# Patient Record
Sex: FEMALE | Race: BLACK OR AFRICAN AMERICAN | Employment: UNEMPLOYED | URBAN - METROPOLITAN AREA
[De-identification: names, ages, dates, MRNs, and addresses within clinical notes are randomized per-mention and may not be internally consistent; named-entity substitution may affect disease eponyms.]

---

## 2020-08-27 ENCOUNTER — APPOINTMENT (OUTPATIENT)
Dept: GENERAL RADIOLOGY | Age: 45
End: 2020-08-27
Attending: EMERGENCY MEDICINE
Payer: COMMERCIAL

## 2020-08-27 ENCOUNTER — HOSPITAL ENCOUNTER (EMERGENCY)
Age: 45
Discharge: HOME | End: 2020-08-27
Attending: EMERGENCY MEDICINE
Payer: COMMERCIAL

## 2020-08-27 VITALS
BODY MASS INDEX: 43.39 KG/M2 | HEIGHT: 66 IN | OXYGEN SATURATION: 100 % | TEMPERATURE: 98.5 F | RESPIRATION RATE: 16 BRPM | HEART RATE: 73 BPM | DIASTOLIC BLOOD PRESSURE: 85 MMHG | WEIGHT: 270 LBS | SYSTOLIC BLOOD PRESSURE: 132 MMHG

## 2020-08-27 DIAGNOSIS — S83.91XA SPRAIN OF RIGHT KNEE, UNSPECIFIED LIGAMENT, INITIAL ENCOUNTER: Primary | ICD-10-CM

## 2020-08-27 DIAGNOSIS — M79.604 RIGHT LEG PAIN: ICD-10-CM

## 2020-08-27 PROCEDURE — 73502 X-RAY EXAM HIP UNI 2-3 VIEWS: CPT

## 2020-08-27 PROCEDURE — 99283 EMERGENCY DEPT VISIT LOW MDM: CPT

## 2020-08-27 PROCEDURE — 73562 X-RAY EXAM OF KNEE 3: CPT

## 2020-08-27 PROCEDURE — 74011250637 HC RX REV CODE- 250/637: Performed by: EMERGENCY MEDICINE

## 2020-08-27 RX ORDER — METHOCARBAMOL 750 MG/1
750 TABLET, FILM COATED ORAL
Status: COMPLETED | OUTPATIENT
Start: 2020-08-27 | End: 2020-08-27

## 2020-08-27 RX ORDER — NAPROXEN 500 MG/1
500 TABLET ORAL
Qty: 20 TAB | Refills: 0 | Status: SHIPPED | OUTPATIENT
Start: 2020-08-27

## 2020-08-27 RX ORDER — CYCLOBENZAPRINE HCL 10 MG
10 TABLET ORAL
Qty: 20 TAB | Refills: 0 | Status: SHIPPED | OUTPATIENT
Start: 2020-08-27

## 2020-08-27 RX ADMIN — METHOCARBAMOL TABLETS 750 MG: 750 TABLET, COATED ORAL at 10:52

## 2020-08-27 NOTE — LETTER
[address] 
[facility] EMERGENCY DEPT 
[address] [place] [id number] [phone] Work/School Note Date: 8/27/2020 To Whom It May concern: Mey Faith was seen and treated today in the emergency room by the following provider(s): 
Attending Provider: Neida Sparrow MD. Mey Faith may return to work on 8/28/2020. Sincerely, Kevin Crenshaw MD

## 2020-08-27 NOTE — ED PROVIDER NOTES
EMERGENCY DEPARTMENT HISTORY AND PHYSICAL EXAM      Date: 8/27/2020  Patient Name: Sudeep Kelly    History of Presenting Illness     Chief Complaint   Patient presents with    Knee Pain     rt knee pain after stepping through a hole in the ground yesterday       History Provided By: Patient    HPI: Sudeep Kelly, 50 y.o. female presents to the ED with cc of right leg pain. Patient states that she was visiting a grave site yesterday, when she accidentally fell into a hole. She states that she put her right foot on the ground and both legs fell through the hole, which was approximately 2-1/2 feet deep. She has not been able to put weight on her right leg due to the knee pain since then. Pain is a 7-8 out of 10 in severity. Patient states she had numbness in her hands when she fell yesterday, because she outstretched her hands when she fell. She denies headache, loss of consciousness, neck pain or back pain. She has minimal discomfort in her left leg. She denies cough, fever, chills, chest pain or shortness of breath. She tried Motrin for pain with minimal relief of symptoms. She states that her pain radiates from the knee to the hip, but she denies any pain in her ankle or foot. There are no other complaints, changes, or physical findings at this time. PCP: Kylee Dougherty MD    No current facility-administered medications on file prior to encounter. No current outpatient medications on file prior to encounter. Past History     Past Medical History:  Past Medical History:   Diagnosis Date    HTN (hypertension)        Past Surgical History:  Past Surgical History:   Procedure Laterality Date    HX OTHER SURGICAL      Right shoulder       Family History:  No family history on file. Social History:  Social History     Tobacco Use    Smoking status: Never Smoker   Substance Use Topics    Alcohol use: Not on file    Drug use: Not on file       Allergies:   Allergies   Allergen Reactions    Betadine [Povidone-Iodine] Hives         Review of Systems   Review of Systems   Constitutional: Negative for fever. HENT: Negative for congestion. Eyes: Negative. Respiratory: Negative for shortness of breath. Cardiovascular: Negative for chest pain. Gastrointestinal: Negative for abdominal pain. Endocrine: Negative for heat intolerance. Genitourinary: Negative. Musculoskeletal: Negative for back pain. Skin: Negative for rash. Allergic/Immunologic: Negative for immunocompromised state. Neurological: Negative for dizziness and headaches. Hematological: Does not bruise/bleed easily. Psychiatric/Behavioral: Negative. All other systems reviewed and are negative. Physical Exam   Physical Exam  Vitals signs and nursing note reviewed. Constitutional:       General: She is not in acute distress. Appearance: She is well-developed. HENT:      Head: Normocephalic. Neck:      Musculoskeletal: Normal range of motion and neck supple. Cardiovascular:      Rate and Rhythm: Normal rate and regular rhythm. Heart sounds: Normal heart sounds. Pulmonary:      Effort: Pulmonary effort is normal.      Breath sounds: Normal breath sounds. Abdominal:      General: Bowel sounds are normal.      Palpations: Abdomen is soft. Tenderness: There is no abdominal tenderness. Musculoskeletal:         General: Tenderness present. Comments: Decreased range of motion right lower extremity, right knee tenderness   Skin:     General: Skin is warm and dry. Neurological:      General: No focal deficit present. Mental Status: She is alert and oriented to person, place, and time. Psychiatric:         Mood and Affect: Mood normal.         Behavior: Behavior normal.         Diagnostic Study Results     Labs -   No results found for this or any previous visit (from the past 12 hour(s)).     Radiologic Studies -   XR HIP RT W OR WO PELV 2-3 VWS   Final Result IMPRESSION:    No acute abnormality. XR KNEE RT 3 V   Final Result   IMPRESSION: Tricompartmental right knee osteoarthritis with small joint   effusion. CT Results  (Last 48 hours)    None        CXR Results  (Last 48 hours)    None          Medical Decision Making   I am the first provider for this patient. I reviewed the vital signs, available nursing notes, past medical history, past surgical history, family history and social history. Vital Signs-Reviewed the patient's vital signs. Patient Vitals for the past 12 hrs:   Temp Pulse Resp BP SpO2   08/27/20 0949 98.5 °F (36.9 °C) 73 16 132/85 100 %           Records Reviewed: Nursing Notes, Old Medical Records and Previous Laboratory Studies    Provider Notes (Medical Decision Making):   Fracture, sprain, contusion, sciatica    ED Course:   Initial assessment performed. The patients presenting problems have been discussed, and they are in agreement with the care plan formulated and outlined with them. I have encouraged them to ask questions as they arise throughout their visit. Progress note:    Patient's results were reviewed. The patient is feeling better. She is advised to follow-up and return to ER if worse               Critical Care Time:   0    Disposition:  home    DISCHARGE PLAN:  1. Discharge Medication List as of 8/27/2020 11:59 AM      START taking these medications    Details   naproxen (NAPROSYN) 500 mg tablet Take 1 Tab by mouth every twelve (12) hours as needed for Pain., Print, Disp-20 Tab,R-0      cyclobenzaprine (FLEXERIL) 10 mg tablet Take 1 Tab by mouth three (3) times daily as needed for Muscle Spasm(s). , Print, Disp-20 Tab,R-0           2.    Follow-up Information     Follow up With Specialties Details Why Contact Info    Marlena Jones MD Family Medicine In 4 days As needed [address] \[facility]\""      Cuong Hudson MD Orthopedic Surgery  As needed [address] [facility]   [facility]  [address]  [facility]  [phone]      [facility] EMERGENCY DEPT Emergency Medicine  If symptoms worsen [address]  [address] [facility]  [phone]        3. Return to ED if worse     Diagnosis     Clinical Impression:   1. Sprain of right knee, unspecified ligament, initial encounter    2. Right leg pain        Attestations:    Rk Reich MD    Please note that this dictation was completed with [employer], the computer voice recognition software. Quite often unanticipated grammatical, syntax, homophones, and other interpretive errors are inadvertently transcribed by the computer software. Please disregard these errors. Please excuse any errors that have escaped final proofreading. Thank you.

## 2020-08-27 NOTE — DISCHARGE INSTRUCTIONS
Patient Education        Knee Sprain: Care Instructions  Your Care Instructions     A knee sprain is one or more stretched, partly torn, or completely torn knee ligaments. Ligaments are bands of ropelike tissue that connect bone to bone and make the knee stable. The knee has four main ligaments. Knee sprains often happen because of a twisting or bending injury from sports such as skiing, basketball, soccer, or football. The knee turns one way while the lower or upper leg goes another way. A sprain also can happen when the knee is hit from the side or the front. If a knee ligament is slightly stretched, you will probably need only home treatment. You may need a splint or brace (immobilizer) for a partly torn ligament. A complete tear may need surgery. A minor knee sprain may take up to 6 weeks to heal, while a severe sprain may take months. Follow-up care is a key part of your treatment and safety. Be sure to make and go to all appointments, and call your doctor if you are having problems. It's also a good idea to know your test results and keep a list of the medicines you take. How can you care for yourself at home? · Follow instructions about how much weight you can put on your leg and how to walk with crutches. · Prop up your leg on a pillow when you ice it or anytime you sit or lie down for the next 3 days. Try to keep it above the level of your heart. This will help reduce swelling. · Put ice or a cold pack on your knee for 10 to 20 minutes at a time. Try to do this every 1 to 2 hours for the next 3 days (when you are awake) or until the swelling goes down. Put a thin cloth between the ice and your skin. Do not get the splint wet. · If you have an elastic bandage, make sure it is snug but not so tight that your leg is numb, tingles, or swells below the bandage. You can loosen the bandage if it is too tight. · Your doctor may recommend a brace (immobilizer) to support your knee while it heals.  Wear it as directed. · Ask your doctor if you can take an over-the-counter pain medicine, such as acetaminophen (Tylenol), ibuprofen (Advil, Motrin), or naproxen (Aleve). Be safe with medicines. Read and follow all instructions on the label. When should you call for help? [id number] anytime you think you may need emergency care. For example, call if:  · You have sudden chest pain and shortness of breath, or you cough up blood. Call your doctor now or seek immediate medical care if:  · You have increased or severe pain. · You cannot move your toes or ankle. · Your foot is cool or pale or changes color. · You have tingling, weakness, or numbness in your foot or leg. · Your splint or brace feels too tight. · You are unable to straighten the knee, or the knee \"locks. \"  · You have signs of a blood clot in your leg, such as:  ? Pain in your calf, back of the knee, thigh, or groin. ? Redness and swelling in your leg. Watch closely for changes in your health, and be sure to contact your doctor if:  · Your pain is not getting better or is getting worse. Where can you learn more? Go to http://www.gray.com/  Enter N406 in the search box to learn more about \"Knee Sprain: Care Instructions. \"  Current as of: March 2, 2020               Content Version: 12.5  © 8066-0061 [employer]. Care instructions adapted under license by [employer] (which disclaims liability or warranty for this information). If you have questions about a medical condition or this instruction, always ask your healthcare professional. [place] [address] any warranty or liability for your use of this information. Patient Education        Leg Pain: Care Instructions  Your Care Instructions  Many things can cause leg pain. Too much exercise or overuse can cause a muscle cramp (or charley horse).  You can get leg cramps from not eating a balanced diet that has enough potassium, calcium, and other minerals. If you do not drink enough fluids or are taking certain medicines, you may develop leg cramps. Other causes of leg pain include injuries, blood flow problems, nerve damage, and twisted and enlarged veins (varicose veins). You can usually ease pain with self-care. Your doctor may recommend that you rest your leg and keep it elevated. Follow-up care is a key part of your treatment and safety. Be sure to make and go to all appointments, and call your doctor if you are having problems. It's also a good idea to know your test results and keep a list of the medicines you take. How can you care for yourself at home? · Take pain medicines exactly as directed. ? If the doctor gave you a prescription medicine for pain, take it as prescribed. ? If you are not taking a prescription pain medicine, ask your doctor if you can take an over-the-counter medicine. · Take any other medicines exactly as prescribed. Call your doctor if you think you are having a problem with your medicine. · Rest your leg while you have pain, and avoid standing for long periods of time. · Prop up your leg at or above the level of your heart when possible. · Make sure you are eating a balanced diet that is rich in calcium, potassium, and magnesium, especially if you are pregnant. · If directed by your doctor, put ice or a cold pack on the area for 10 to 20 minutes at a time. Put a thin cloth between the ice and your skin. · Your leg may be in a splint, a brace, or an elastic bandage, and you may have crutches to help you walk. Follow your doctor's directions about how long to wear supports and how to use the crutches. When should you call for help? [id number] anytime you think you may need emergency care. For example, call if:  · You have sudden chest pain and shortness of breath, or you cough up blood. · Your leg is cool or pale or changes color.   Call your doctor now or seek immediate medical care if:  · You have increasing or severe pain. · Your leg suddenly feels weak and you cannot move it. · You have signs of a blood clot, such as:  ? Pain in your calf, back of the knee, thigh, or groin. ? Redness and swelling in your leg or groin. · You have signs of infection, such as:  ? Increased pain, swelling, warmth, or redness. ? Red streaks leading from the sore area. ? Pus draining from a place on your leg. ? A fever. · You cannot bear weight on your leg. Watch closely for changes in your health, and be sure to contact your doctor if:  · You do not get better as expected. Where can you learn more? Go to http://raquel-reagan.info/  Enter Z719 in the search box to learn more about \"Leg Pain: Care Instructions. \"  Current as of: June 26, 2019               Content Version: 12.5  © 6779-5587 [employer]. Care instructions adapted under license by [employer] (which disclaims liability or warranty for this information). If you have questions about a medical condition or this instruction, always ask your healthcare professional. [place] [address] any warranty or liability for your use of this information.

## 2020-11-05 ENCOUNTER — APPOINTMENT (OUTPATIENT)
Dept: CT IMAGING | Age: 45
DRG: 103 | End: 2020-11-05
Attending: EMERGENCY MEDICINE
Payer: COMMERCIAL

## 2020-11-05 ENCOUNTER — HOSPITAL ENCOUNTER (INPATIENT)
Age: 45
LOS: 3 days | Discharge: HOME | DRG: 103 | End: 2020-11-08
Attending: EMERGENCY MEDICINE | Admitting: INTERNAL MEDICINE
Payer: COMMERCIAL

## 2020-11-05 DIAGNOSIS — R51.9 ACUTE NONINTRACTABLE HEADACHE, UNSPECIFIED HEADACHE TYPE: ICD-10-CM

## 2020-11-05 DIAGNOSIS — R47.1 DYSARTHRIA: ICD-10-CM

## 2020-11-05 DIAGNOSIS — I63.81 CEREBROVASCULAR ACCIDENT (CVA) DUE TO OCCLUSION OF SMALL ARTERY (HCC): Primary | ICD-10-CM

## 2020-11-05 PROBLEM — I63.9 CVA (CEREBRAL VASCULAR ACCIDENT) (HCC): Status: ACTIVE | Noted: 2020-11-05

## 2020-11-05 PROBLEM — Z92.82 S/P ADMN TPA IN DIFF FAC W/N LAST 24 HR BEF ADM TO CRNT FAC: Status: ACTIVE | Noted: 2020-11-05

## 2020-11-05 LAB
ALBUMIN SERPL-MCNC: 3.7 G/DL (ref 3.5–5)
ALBUMIN/GLOB SERPL: 0.9 {RATIO} (ref 1.1–2.2)
ALP SERPL-CCNC: 76 U/L (ref 45–117)
ALT SERPL-CCNC: 17 U/L (ref 12–78)
ANION GAP SERPL CALC-SCNC: 5 MMOL/L (ref 5–15)
AST SERPL-CCNC: 14 U/L (ref 15–37)
BASOPHILS # BLD: 0 K/UL (ref 0–0.1)
BASOPHILS NFR BLD: 1 % (ref 0–1)
BILIRUB SERPL-MCNC: 0.3 MG/DL (ref 0.2–1)
BUN SERPL-MCNC: 9 MG/DL (ref 6–20)
BUN/CREAT SERPL: 12 (ref 12–20)
CALCIUM SERPL-MCNC: 9.3 MG/DL (ref 8.5–10.1)
CHLORIDE SERPL-SCNC: 106 MMOL/L (ref 97–108)
CO2 SERPL-SCNC: 29 MMOL/L (ref 21–32)
CREAT SERPL-MCNC: 0.74 MG/DL (ref 0.55–1.02)
DIFFERENTIAL METHOD BLD: ABNORMAL
EOSINOPHIL # BLD: 0.1 K/UL (ref 0–0.4)
EOSINOPHIL NFR BLD: 1 % (ref 0–7)
ERYTHROCYTE [DISTWIDTH] IN BLOOD BY AUTOMATED COUNT: 14.4 % (ref 11.5–14.5)
GLOBULIN SER CALC-MCNC: 4 G/DL (ref 2–4)
GLUCOSE BLD STRIP.AUTO-MCNC: 84 MG/DL (ref 65–100)
GLUCOSE SERPL-MCNC: 82 MG/DL (ref 65–100)
HCT VFR BLD AUTO: 39.1 % (ref 35–47)
HGB BLD-MCNC: 12.6 G/DL (ref 11.5–16)
IMM GRANULOCYTES # BLD AUTO: 0 K/UL (ref 0–0.04)
IMM GRANULOCYTES NFR BLD AUTO: 0 % (ref 0–0.5)
INR PPP: 1 (ref 0.9–1.1)
LYMPHOCYTES # BLD: 1.4 K/UL (ref 0.8–3.5)
LYMPHOCYTES NFR BLD: 20 % (ref 12–49)
MCH RBC QN AUTO: 29.9 PG (ref 26–34)
MCHC RBC AUTO-ENTMCNC: 32.2 G/DL (ref 30–36.5)
MCV RBC AUTO: 92.9 FL (ref 80–99)
MONOCYTES # BLD: 0.5 K/UL (ref 0–1)
MONOCYTES NFR BLD: 7 % (ref 5–13)
NEUTS SEG # BLD: 5 K/UL (ref 1.8–8)
NEUTS SEG NFR BLD: 71 % (ref 32–75)
NRBC # BLD: 0 K/UL (ref 0–0.01)
NRBC BLD-RTO: 0 PER 100 WBC
PLATELET # BLD AUTO: 408 K/UL (ref 150–400)
PMV BLD AUTO: 8.6 FL (ref 8.9–12.9)
POTASSIUM SERPL-SCNC: 3.5 MMOL/L (ref 3.5–5.1)
PROT SERPL-MCNC: 7.7 G/DL (ref 6.4–8.2)
PROTHROMBIN TIME: 10.6 SEC (ref 9–11.1)
RBC # BLD AUTO: 4.21 M/UL (ref 3.8–5.2)
SERVICE CMNT-IMP: NORMAL
SODIUM SERPL-SCNC: 140 MMOL/L (ref 136–145)
TROPONIN I SERPL-MCNC: <0.05 NG/ML
WBC # BLD AUTO: 7 K/UL (ref 3.6–11)

## 2020-11-05 PROCEDURE — 94762 N-INVAS EAR/PLS OXIMTRY CONT: CPT

## 2020-11-05 PROCEDURE — 85610 PROTHROMBIN TIME: CPT

## 2020-11-05 PROCEDURE — 92610 EVALUATE SWALLOWING FUNCTION: CPT

## 2020-11-05 PROCEDURE — 36415 COLL VENOUS BLD VENIPUNCTURE: CPT

## 2020-11-05 PROCEDURE — 80053 COMPREHEN METABOLIC PANEL: CPT

## 2020-11-05 PROCEDURE — 84484 ASSAY OF TROPONIN QUANT: CPT

## 2020-11-05 PROCEDURE — 82962 GLUCOSE BLOOD TEST: CPT

## 2020-11-05 PROCEDURE — 37195 THROMBOLYTIC THERAPY STROKE: CPT

## 2020-11-05 PROCEDURE — 99285 EMERGENCY DEPT VISIT HI MDM: CPT

## 2020-11-05 PROCEDURE — 3E03317 INTRODUCTION OF OTHER THROMBOLYTIC INTO PERIPHERAL VEIN, PERCUTANEOUS APPROACH: ICD-10-PCS | Performed by: INTERNAL MEDICINE

## 2020-11-05 PROCEDURE — 74011000258 HC RX REV CODE- 258: Performed by: EMERGENCY MEDICINE

## 2020-11-05 PROCEDURE — 85025 COMPLETE CBC W/AUTO DIFF WBC: CPT

## 2020-11-05 PROCEDURE — 74011000250 HC RX REV CODE- 250: Performed by: EMERGENCY MEDICINE

## 2020-11-05 PROCEDURE — 74011250636 HC RX REV CODE- 250/636: Performed by: INTERNAL MEDICINE

## 2020-11-05 PROCEDURE — 96360 HYDRATION IV INFUSION INIT: CPT

## 2020-11-05 PROCEDURE — 93005 ELECTROCARDIOGRAM TRACING: CPT

## 2020-11-05 PROCEDURE — 0042T CT CODE NEURO PERF W CBF: CPT

## 2020-11-05 PROCEDURE — 74011000636 HC RX REV CODE- 636: Performed by: EMERGENCY MEDICINE

## 2020-11-05 PROCEDURE — 70496 CT ANGIOGRAPHY HEAD: CPT

## 2020-11-05 PROCEDURE — 74011250637 HC RX REV CODE- 250/637: Performed by: INTERNAL MEDICINE

## 2020-11-05 PROCEDURE — 74011250636 HC RX REV CODE- 250/636: Performed by: EMERGENCY MEDICINE

## 2020-11-05 PROCEDURE — 4A03X5D MEASUREMENT OF ARTERIAL FLOW, INTRACRANIAL, EXTERNAL APPROACH: ICD-10-PCS | Performed by: STUDENT IN AN ORGANIZED HEALTH CARE EDUCATION/TRAINING PROGRAM

## 2020-11-05 PROCEDURE — 70450 CT HEAD/BRAIN W/O DYE: CPT

## 2020-11-05 PROCEDURE — 65620000000 HC RM CCU GENERAL

## 2020-11-05 RX ORDER — DEXTROSE 50 % IN WATER (D50W) INTRAVENOUS SYRINGE
12.5
Status: ACTIVE | OUTPATIENT
Start: 2020-11-05 | End: 2020-11-06

## 2020-11-05 RX ORDER — ATORVASTATIN CALCIUM 40 MG/1
80 TABLET, FILM COATED ORAL
Status: DISCONTINUED | OUTPATIENT
Start: 2020-11-05 | End: 2020-11-08 | Stop reason: HOSPADM

## 2020-11-05 RX ORDER — SODIUM CHLORIDE 0.9 % (FLUSH) 0.9 %
10 SYRINGE (ML) INJECTION
Status: COMPLETED | OUTPATIENT
Start: 2020-11-05 | End: 2020-11-05

## 2020-11-05 RX ORDER — ONDANSETRON 2 MG/ML
4 INJECTION INTRAMUSCULAR; INTRAVENOUS
Status: DISCONTINUED | OUTPATIENT
Start: 2020-11-05 | End: 2020-11-08 | Stop reason: HOSPADM

## 2020-11-05 RX ORDER — SODIUM CHLORIDE 9 MG/ML
50 INJECTION, SOLUTION INTRAVENOUS ONCE
Status: COMPLETED | OUTPATIENT
Start: 2020-11-05 | End: 2020-11-05

## 2020-11-05 RX ORDER — ACETAMINOPHEN 650 MG/1
650 SUPPOSITORY RECTAL
Status: DISCONTINUED | OUTPATIENT
Start: 2020-11-05 | End: 2020-11-06

## 2020-11-05 RX ORDER — LABETALOL HYDROCHLORIDE 5 MG/ML
5 INJECTION, SOLUTION INTRAVENOUS
Status: DISCONTINUED | OUTPATIENT
Start: 2020-11-05 | End: 2020-11-08 | Stop reason: HOSPADM

## 2020-11-05 RX ORDER — SODIUM CHLORIDE 0.9 % (FLUSH) 0.9 %
5-40 SYRINGE (ML) INJECTION AS NEEDED
Status: DISCONTINUED | OUTPATIENT
Start: 2020-11-05 | End: 2020-11-08 | Stop reason: HOSPADM

## 2020-11-05 RX ORDER — SODIUM CHLORIDE 0.9 % (FLUSH) 0.9 %
5-40 SYRINGE (ML) INJECTION EVERY 8 HOURS
Status: DISCONTINUED | OUTPATIENT
Start: 2020-11-05 | End: 2020-11-08 | Stop reason: HOSPADM

## 2020-11-05 RX ORDER — ACETAMINOPHEN 325 MG/1
650 TABLET ORAL
Status: DISCONTINUED | OUTPATIENT
Start: 2020-11-05 | End: 2020-11-06

## 2020-11-05 RX ADMIN — ONDANSETRON 4 MG: 2 INJECTION INTRAMUSCULAR; INTRAVENOUS at 15:01

## 2020-11-05 RX ADMIN — Medication 9 MG: at 12:04

## 2020-11-05 RX ADMIN — SODIUM CHLORIDE 1000 ML: 900 INJECTION, SOLUTION INTRAVENOUS at 11:53

## 2020-11-05 RX ADMIN — ACETAMINOPHEN 650 MG: 325 TABLET ORAL at 16:24

## 2020-11-05 RX ADMIN — ATORVASTATIN CALCIUM 40 MG: 40 TABLET, FILM COATED ORAL at 22:08

## 2020-11-05 RX ADMIN — ALTEPLASE 81 MG: KIT at 12:05

## 2020-11-05 RX ADMIN — SODIUM CHLORIDE 50 ML: 9 INJECTION, SOLUTION INTRAVENOUS at 12:05

## 2020-11-05 RX ADMIN — Medication 10 ML: at 22:09

## 2020-11-05 RX ADMIN — Medication 10 ML: at 11:52

## 2020-11-05 RX ADMIN — IOPAMIDOL 110 ML: 755 INJECTION, SOLUTION INTRAVENOUS at 11:52

## 2020-11-05 RX ADMIN — SODIUM CHLORIDE 50 ML: 9 INJECTION, SOLUTION INTRAVENOUS at 13:05

## 2020-11-05 RX ADMIN — Medication 10 ML: at 15:01

## 2020-11-05 NOTE — ED PROVIDER NOTES
EMERGENCY DEPARTMENT HISTORY AND PHYSICAL EXAM      Date: 11/5/2020  Patient Name: Gaby Vang    History of Presenting Illness     Chief Complaint   Patient presents with    Headache     pt complains of acute onset of headache, left jaw pain, blurry vision and difficulty speaking that came on at work today, but she is unsure of the time. She knows she woke up normal this morning       History Provided By: Patient and patient's supervisor at the [facility]    HPI: Gaby Vang, 50 y.o. female presents to the ED with medical record history of hypertension and osteoarthritis, possibly asthma, here with difficulty speaking that started around 8:45 AM.  Patient was driven here by a coworker. Patient could relay in triage that she woke up feeling normal and went to work normal.  Speaking with a supervisor at the [facility] on the phone, the [facility] supervisor noted that the patient came to her around [id number] says she was not quite feeling well. She was encouraged to go home but she stated work and then a change was noted at 8:45 AM.  She was driven to the ED by a coworker who we are unable to find or communicate with. Patient describes a headache as well as some jaw pain not on the left jaw but the right side of the jaws where she motions to. She has difficulty, venting a complete history and was transitioned to the CAT scanner for a level 1 code stroke evaluation. He denies any chest pain, shortness of breath or difficulty breathing. She says she has never had symptoms like this before. Review of the medical record does not show she is on any blood thinners. She denies any head trauma. There are no other complaints, changes, or physical findings at this time. PCP: Mabel Singh MD    No current facility-administered medications on file prior to encounter.       Current Outpatient Medications on File Prior to Encounter   Medication Sig Dispense Refill    naproxen (NAPROSYN) 500 mg tablet Take 1 Tab by mouth every twelve (12) hours as needed for Pain. 20 Tab 0    cyclobenzaprine (FLEXERIL) 10 mg tablet Take 1 Tab by mouth three (3) times daily as needed for Muscle Spasm(s). 20 Tab 0       Past History     Past Medical History:  Past Medical History:   Diagnosis Date    HTN (hypertension)        Past Surgical History:  Past Surgical History:   Procedure Laterality Date    HX OTHER SURGICAL      Right shoulder       Family History:  No family history on file. Social History:  Social History     Tobacco Use    Smoking status: Never Smoker   Substance Use Topics    Alcohol use: Not on file    Drug use: Not on file       Allergies: Allergies   Allergen Reactions    Betadine [Povidone-Iodine] Hives         Review of Systems   Review of Systems   Unable to perform ROS: Mental status change       Physical Exam   Physical Exam   Vital signs and nursing notes reviewed    CONSTITUTIONAL: Alert, in moderate distress; well-developed; well-nourished. Obese body habitus. Tearful. HEAD:  Normocephalic, atraumatic  EYES: PERRL; EOM's intact. ENTM: Nose: no rhinorrhea; Throat: no erythema or exudate, mucous membranes moist  Neck:  Supple. trachea is midline. RESP: Chest clear, equal breath sounds. - W/R/R  CV: S1 and S2 WNL; No murmurs, gallops or rubs. 2+ radial and DP pulses bilaterally. GI: non-distended, normal bowel sounds, abdomen soft and non-tender. No masses or organomegaly. : No costo-vertebral angle tenderness. BACK:  Non-tender, normal appearance  UPPER EXT:  Normal inspection. no joint or soft tissue swelling  LOWER EXT: No edema, no calf tenderness. NEURO: Alert and oriented to person, place, thinks it is August.  Initially said she was 53 and then corrected herself and said she was 50. She is having expressive aphasia with some mild dysarthria, decreased weakness and sensation in the left upper and lower extremity. SKIN: No rashes;  Warm and dry  PSYCH: Normal mood, normal affect    Diagnostic Study Results     Labs -     Recent Results (from the past 12 hour(s))   GLUCOSE, POC    Collection Time: 11/05/20 11:36 AM   Result Value Ref Range    Glucose (POC) 84 65 - 100 mg/dL    Performed by Beverly Pizarro    CBC WITH AUTOMATED DIFF    Collection Time: 11/05/20 11:48 AM   Result Value Ref Range    WBC 7.0 3.6 - 11.0 K/uL    RBC 4.21 3.80 - 5.20 M/uL    HGB 12.6 11.5 - 16.0 g/dL    HCT 39.1 35.0 - 47.0 %    MCV 92.9 80.0 - 99.0 FL    MCH 29.9 26.0 - 34.0 PG    MCHC 32.2 30.0 - 36.5 g/dL    RDW 14.4 11.5 - 14.5 %    PLATELET 337 (H) 888 - 400 K/uL    MPV 8.6 (L) 8.9 - 12.9 FL    NRBC 0.0 0  WBC    ABSOLUTE NRBC 0.00 0.00 - 0.01 K/uL    NEUTROPHILS 71 32 - 75 %    LYMPHOCYTES 20 12 - 49 %    MONOCYTES 7 5 - 13 %    EOSINOPHILS 1 0 - 7 %    BASOPHILS 1 0 - 1 %    IMMATURE GRANULOCYTES 0 0.0 - 0.5 %    ABS. NEUTROPHILS 5.0 1.8 - 8.0 K/UL    ABS. LYMPHOCYTES 1.4 0.8 - 3.5 K/UL    ABS. MONOCYTES 0.5 0.0 - 1.0 K/UL    ABS. EOSINOPHILS 0.1 0.0 - 0.4 K/UL    ABS. BASOPHILS 0.0 0.0 - 0.1 K/UL    ABS. IMM. GRANS. 0.0 0.00 - 0.04 K/UL    DF AUTOMATED     METABOLIC PANEL, COMPREHENSIVE    Collection Time: 11/05/20 11:48 AM   Result Value Ref Range    Sodium 140 136 - 145 mmol/L    Potassium 3.5 3.5 - 5.1 mmol/L    Chloride 106 97 - 108 mmol/L    CO2 29 21 - 32 mmol/L    Anion gap 5 5 - 15 mmol/L    Glucose 82 65 - 100 mg/dL    BUN 9 6 - 20 MG/DL    Creatinine 0.74 0.55 - 1.02 MG/DL    BUN/Creatinine ratio 12 12 - 20      GFR est AA >60 >60 ml/min/1.73m2    GFR est non-AA >60 >60 ml/min/1.73m2    Calcium 9.3 8.5 - 10.1 MG/DL    Bilirubin, total 0.3 0.2 - 1.0 MG/DL    ALT (SGPT) 17 12 - 78 U/L    AST (SGOT) 14 (L) 15 - 37 U/L    Alk.  phosphatase 76 45 - 117 U/L    Protein, total 7.7 6.4 - 8.2 g/dL    Albumin 3.7 3.5 - 5.0 g/dL    Globulin 4.0 2.0 - 4.0 g/dL    A-G Ratio 0.9 (L) 1.1 - 2.2     PROTHROMBIN TIME + INR    Collection Time: 11/05/20 11:48 AM   Result Value Ref Range    INR 1.0 0.9 - 1.1      Prothrombin time 10.6 9.0 - 11.1 sec   TROPONIN I    Collection Time: 11/05/20 11:48 AM   Result Value Ref Range    Troponin-I, Qt. <0.05 <0.05 ng/mL       Radiologic Studies -   CTA CODE NEURO HEAD AND NECK W CONT   Final Result   IMPRESSION:   No evidence of large vessel occlusion, perfusion abnormality, or hemodynamically   significant carotid stenosis. CT CODE NEURO PERF W CBF   Final Result   IMPRESSION:   No evidence of large vessel occlusion, perfusion abnormality, or hemodynamically   significant carotid stenosis. CT CODE NEURO HEAD WO CONTRAST   Final Result   IMPRESSION:    Normal CT of the head. CT Results  (Last 48 hours)               11/05/20 1219  CTA CODE NEURO HEAD AND NECK W CONT Final result    Impression:  IMPRESSION:   No evidence of large vessel occlusion, perfusion abnormality, or hemodynamically   significant carotid stenosis. Narrative:  CLINICAL HISTORY: Headache, blurred vision       EXAMINATION:  CT ANGIOGRAPHY HEAD AND NECK, CT PERFUSION       COMPARISON: None       TECHNIQUE:  Following the uneventful administration of iodinated contrast   material, axial CT angiography of the head and neck was performed. Delayed axial   images through the head were also obtained. Coronal and sagittal reconstructions   were obtained. Manual postprocessing of images was performed. 3-D  Sagittal   maximal intensity projection images were obtained. 3-D Coronal maximal   intensity projections were obtained. CT brain perfusion was performed with   generation of hemodynamic maps of multiple parameters, including cerebral blood   flow, cerebral blood volume, mean transit time, and TMAX. CT dose reduction was   achieved through use of a standardized protocol tailored for this examination   and automatic exposure control for dose modulation. This study was analyzed by   the [address] ai algorithm. FINDINGS:       Delayed contrast-enhanced head CT:       The ventricles are midline without hydrocephalus. There is no acute intra or   extra-axial hemorrhage. The basal cisterns are clear. The paranasal sinuses are   clear. CTA NECK:       Great vessels: Normal arch anatomy with the origins patent. Right subclavian artery: Patent       Left subclavian artery: Patent        Right common carotid artery: Patent        Left common carotid artery: Patent        Cervical right internal carotid artery: Patent with no significant stenosis by   NASCET criteria. Cervical left internal carotid artery: Patent with no significant stenosis by   NASCET criteria. Right vertebral artery: Patent       Left vertebral artery: Patent       The lung apices are clear. The thyroid is homogeneous. No cervical   lymphadenopathy. CTA HEAD:       Right cavernous internal carotid artery: Patent       Left cavernous internal carotid artery: Patent       Anterior cerebral arteries: Patent       Anterior communicating artery: Patent       Right middle cerebral artery: Patent       Left middle cerebral artery: Patent       Posterior communicating arteries: Patent       Posterior cerebral arteries: Patent       Basilar artery: Patent       Distal vertebral arteries: Patent       No evidence for intracranial aneurysm or hemodynamically significant stenosis. CT Perfusion:   Normal CT perfusion. 11/05/20 1219  CT CODE NEURO PERF W CBF Final result    Impression:  IMPRESSION:   No evidence of large vessel occlusion, perfusion abnormality, or hemodynamically   significant carotid stenosis. Narrative:  CLINICAL HISTORY: Headache, blurred vision       EXAMINATION:  CT ANGIOGRAPHY HEAD AND NECK, CT PERFUSION       COMPARISON: None       TECHNIQUE:  Following the uneventful administration of iodinated contrast   material, axial CT angiography of the head and neck was performed. Delayed axial   images through the head were also obtained. Coronal and sagittal reconstructions   were obtained.  Manual postprocessing of images was performed. 3-D  Sagittal   maximal intensity projection images were obtained. 3-D Coronal maximal   intensity projections were obtained. CT brain perfusion was performed with   generation of hemodynamic maps of multiple parameters, including cerebral blood   flow, cerebral blood volume, mean transit time, and TMAX. CT dose reduction was   achieved through use of a standardized protocol tailored for this examination   and automatic exposure control for dose modulation. This study was analyzed by   the [address] ai algorithm. FINDINGS:       Delayed contrast-enhanced head CT:       The ventricles are midline without hydrocephalus. There is no acute intra or   extra-axial hemorrhage. The basal cisterns are clear. The paranasal sinuses are   clear. CTA NECK:       Great vessels: Normal arch anatomy with the origins patent. Right subclavian artery: Patent       Left subclavian artery: Patent        Right common carotid artery: Patent        Left common carotid artery: Patent        Cervical right internal carotid artery: Patent with no significant stenosis by   NASCET criteria. Cervical left internal carotid artery: Patent with no significant stenosis by   NASCET criteria. Right vertebral artery: Patent       Left vertebral artery: Patent       The lung apices are clear. The thyroid is homogeneous. No cervical   lymphadenopathy. CTA HEAD:       Right cavernous internal carotid artery: Patent       Left cavernous internal carotid artery: Patent       Anterior cerebral arteries: Patent       Anterior communicating artery: Patent       Right middle cerebral artery: Patent       Left middle cerebral artery: Patent       Posterior communicating arteries: Patent       Posterior cerebral arteries: Patent       Basilar artery: Patent       Distal vertebral arteries: Patent       No evidence for intracranial aneurysm or hemodynamically significant stenosis.        CT Perfusion:   Normal CT perfusion. 11/05/20 1149  CT CODE NEURO HEAD WO CONTRAST Final result    Impression:  IMPRESSION:    Normal CT of the head. Narrative:  EXAM: CT CODE NEURO HEAD WO CONTRAST       INDICATION: Code Stroke, blurry vision and difficulty speaking       COMPARISON: None. CONTRAST: None. TECHNIQUE: Unenhanced CT of the head was performed using 5 mm images. Brain and   bone windows were generated. Coronal and sagittal reformats. CT dose reduction   was achieved through use of a standardized protocol tailored for this   examination and automatic exposure control for dose modulation. FINDINGS:   The ventricles and sulci are normal in size, shape and configuration. . There is   no significant white matter disease. There is no intracranial hemorrhage,   extra-axial collection, or mass effect. The basilar cisterns are open. No CT   evidence of acute infarct. The bone windows demonstrate no abnormalities. There is mucosal thickening of   the maxillary sinuses. CXR Results  (Last 48 hours)    None          Medical Decision Making   I am the first provider for this patient. I reviewed the vital signs, available nursing notes, past medical history, past surgical history, family history and social history. Vital Signs-Reviewed the patient's vital signs. Patient Vitals for the past 12 hrs:   Temp Pulse Resp BP SpO2   11/05/20 1245  (!) 55 18 (!) 153/91 98 %   11/05/20 1242  62 17 (!) 150/76 99 %   11/05/20 1241  63 14 (!) 150/76 99 %   11/05/20 1233  64 24 (!) 144/85 99 %   11/05/20 1135 98.6 °F (37 °C) 64 16 (!) 156/99 100 %       EKG interpretation: (Preliminary)  EKG performed at 12:20 PM, as interpreted by me shows a normal sinus rhythm at a rate of 64, normal axis, normal intervals, slight T wave inversion in V2 and V3 is present without acute ST elevations or depressions.     Records Reviewed: Nursing Notes, Old Medical Records, Previous Radiology Studies and Previous Laboratory Studies    Provider Notes (Medical Decision Making):   53-year-old female with acute neurological deficit within the level 1 code stroke window and is eligible for TPA and has neuro deficits where TPA is warranted. Attempted to contact both her  and son through phone numbers available in the medical record but both went to voicemail. Patient was able to converse with neurologist to discuss risks and benefits and agreed to TPA which was administered at 12:04 PM.  IV fluid bolus also provided in the emergency department with plan for hospital admission here as patient has no large vessel occlusion on CTA. Evaluate EKG and troponin given patient's jaw pain but reassuring and [place] with only slight T wave inversions in V2 and V3. ED Course:   Initial assessment performed. The patients presenting problems have been discussed, and they are in agreement with the care plan formulated and outlined with them. I have encouraged them to ask questions as they arise throughout their visit. ED Course as of Nov 05 1311   Thu Nov 05, 2020   1205 TPA bolus started at 1204. [TL]      ED Course User Index  [TL] Ronald Stanley MD           Critical Care Time:   CRITICAL CARE NOTE :    11:42 AM    IMPENDING DETERIORATION -CNS  ASSOCIATED RISK FACTORS - CNS Decompensation  MANAGEMENT- Bedside Assessment and Supervision of Care  INTERPRETATION -  CT Scan, ECG, Blood Pressure and Cardiac Output Measures   INTERVENTIONS - hemodynamic mngmt and Neurologic interventions including TPA administration. CASE REVIEW - Hospitalist, Medical Sub-Specialist and Nursing  TREATMENT RESPONSE -Stable  PERFORMED BY - Self    NOTES   :  I have spent 75 minutes of critical care time involved in lab review, consultations with specialist, family decision- making, bedside attention and documentation. This time excludes time spent in any separate billed procedures.   During this entire length of time I was immediately available to the patient . Heather Masterson MD      Disposition:  Admit    Admit Note:  1:16 PM  Pt is being admitted by Dr. Keira Gregory. The results of their tests and reason(s) for their admission have been discussed with pt and/or available family. They convey agreement and understanding for the need to be admitted and for admission diagnosis. Diagnosis     Clinical Impression:   1. Cerebrovascular accident (CVA) due to occlusion of small artery ([address]    2. Dysarthria    3. Acute nonintractable headache, unspecified headache type        Attestations:    Heather Masterson MD    Please note that this dictation was completed with [employer], the computer voice recognition software. Quite often unanticipated grammatical, syntax, homophones, and other interpretive errors are inadvertently transcribed by the computer software. Please disregard these errors. Please excuse any errors that have escaped final proofreading. Thank you.

## 2020-11-05 NOTE — H&P
Hospitalist Admission Note    NAME: Osmani Edouard   :  1975   MRN:  [id number]     Date/Time:  2020 3:58 PM    Patient PCP: Sanjuana Boss MD  ______________________________________________________________________  Given the patient's current clinical presentation, I have a high level of concern for decompensation if discharged from the emergency department. Complex decision making was performed, which includes reviewing the patient's available past medical records, laboratory results, and x-ray films. My assessment of this patient's clinical condition and my plan of care is as follows. Assessment / Plan:  CVA status post TPA  Un-controlled hypertension  Admit into ICU, neurochecks, MRI of the brain without contrast, 2D echo  Lipid profile and HbA1c   CTA of the brain/ neck was negative for any major vessel occlusion  CT of the head in 24 hours, if no bleeding will start aspirin   Start statin once cleared by speech  Dysphagia screen prior to p.o. intake  Keep blood pressure below 180  Consult neurology  Bedrest, DVT prophylaxis with SCDs  Jaw pain, unclear reason  hiStory of tumor removal  Monitor    Code Status: Full code  Surrogate Decision Maker: Daughter    DVT Prophylaxis: SCDs  GI Prophylaxis: not indicated    Baseline: Ambulatory      Subjective:   CHIEF COMPLAINT: Dysarthria and left-sided weakness    HISTORY OF PRESENT ILLNESS:     Osmani Edouard is a 50 y.o.  female past medical history hypertension who was sent to the ED with sudden onset of dysarthria associated with some left-sided weakness that started when she was at her job around 8:45 AM.  Patient received TPA in the ED and her symptoms improved except for a residual mild left lower extremity weakness. And was seen after TPA, reported that day prior to her symptoms she was having headaches and jaw pain. She reported some nausea on her way to the ED.  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 In the ED, vital signs upon presentation showed that she was hypertensive, her labs showed normal CBC and BMP  CT of the brain /CTA of the brain and neck were within normal limit  We were asked to admit for work up and evaluation of the above problems. Past Medical History:   Diagnosis Date    HTN (hypertension)         Past Surgical History:   Procedure Laterality Date    HX OTHER SURGICAL      Right shoulder       Social History     Tobacco Use    Smoking status: Never Smoker   Substance Use Topics    Alcohol use: Not on file        No family history on file. Allergies   Allergen Reactions    Betadine [Povidone-Iodine] Hives        Prior to Admission medications    Medication Sig Start Date End Date Taking? Authorizing Provider   naproxen (NAPROSYN) 500 mg tablet Take 1 Tab by mouth every twelve (12) hours as needed for Pain. 8/27/20   Humaira Brown MD   cyclobenzaprine (FLEXERIL) 10 mg tablet Take 1 Tab by mouth three (3) times daily as needed for Muscle Spasm(s). 8/27/20   Humaira Brown MD       REVIEW OF SYSTEMS:     I am not able to complete the review of systems because:    The patient is intubated and sedated    The patient has altered mental status due to his acute medical problems    The patient has baseline aphasia from prior stroke(s)    The patient has baseline dementia and is not reliable historian    The patient is in acute medical distress and unable to provide information           Total of 12 systems reviewed as follows:       POSITIVE= underlined text  Negative = text not underlined  General:  fever, chills, sweats, generalized weakness, weight loss/gain,      loss of appetite   Eyes:    blurred vision, eye pain, loss of vision, double vision  ENT:    rhinorrhea, pharyngitis   Respiratory:   cough, sputum production, SOB, HAHN, wheezing, pleuritic pain   Cardiology:   chest pain, palpitations, orthopnea, PND, edema, syncope   Gastrointestinal:  abdominal pain , N/V, diarrhea, dysphagia, constipation, bleeding   Genitourinary:  frequency, urgency, dysuria, hematuria, incontinence   Muskuloskeletal :  arthralgia, myalgia, back pain  Hematology:  easy bruising, nose or gum bleeding, lymphadenopathy   Dermatological: rash, ulceration, pruritis, color change / jaundice  Endocrine:   hot flashes or polydipsia   Neurological:  headache, dizziness, confusion, focal weakness, paresthesia,     Speech difficulties, memory loss, gait difficulty  Psychological: Feelings of anxiety, depression, agitation    Objective:   VITALS:    Visit Vitals  BP (!) 143/93   Pulse 63   Temp 98.6 °F (37 °C)   Resp 20   Ht 5' 6\" (1.676 m)   Wt 122.5 kg (270 lb)   SpO2 99%   BMI 43.58 kg/m²       PHYSICAL EXAM:    General:    Alert, cooperative, no distress, appears stated age. HEENT: Atraumatic, anicteric sclerae, pink conjunctivae     No oral ulcers, mucosa moist, throat clear, dentition fair  Neck:  Supple, symmetrical,  thyroid: non tender  Lungs:   Clear to auscultation bilaterally. No Wheezing or Rhonchi. No rales. Chest wall:  No tenderness  No Accessory muscle use. Heart:   Regular  rhythm,  No  murmur   No edema  Abdomen:   Soft, non-tender. Not distended. Bowel sounds normal  Extremities: No cyanosis. No clubbing,      Skin turgor normal, Capillary refill normal, Radial dial pulse 2+  Skin:     Not pale. Not Jaundiced  No rashes   Psych:  Good insight. Not depressed. Not anxious or agitated. Neurologic: EOMs intact. No facial asymmetry. Persistent mild slurred speech. Persistent drift on the left lower extremity, Sensation grossly intact.  Alert and oriented X 4.     _______________________________________________________________________  Care Plan discussed with:    Comments   Patient x    Family      RN x    Care Manager                    Consultant:      _______________________________________________________________________  Expected  Disposition:   Home with Family x HH/PT/OT/RN    SNF/LTC    JESSICA    ________________________________________________________________________  TOTAL TIME:  65 Minutes    Critical Care Provided     Minutes non procedure based      Comments     Reviewed previous records   >50% of visit spent in counseling and coordination of care  Discussion with patient and/or family and questions answered       ________________________________________________________________________  Signed: Clark Webster MD    Procedures: see electronic medical records for all procedures/Xrays and details which were not copied into this note but were reviewed prior to creation of Plan. LAB DATA REVIEWED:    Recent Results (from the past 24 hour(s))   GLUCOSE, POC    Collection Time: 11/05/20 11:36 AM   Result Value Ref Range    Glucose (POC) 84 65 - 100 mg/dL    Performed by Sky Taylor    CBC WITH AUTOMATED DIFF    Collection Time: 11/05/20 11:48 AM   Result Value Ref Range    WBC 7.0 3.6 - 11.0 K/uL    RBC 4.21 3.80 - 5.20 M/uL    HGB 12.6 11.5 - 16.0 g/dL    HCT 39.1 35.0 - 47.0 %    MCV 92.9 80.0 - 99.0 FL    MCH 29.9 26.0 - 34.0 PG    MCHC 32.2 30.0 - 36.5 g/dL    RDW 14.4 11.5 - 14.5 %    PLATELET 159 (H) 396 - 400 K/uL    MPV 8.6 (L) 8.9 - 12.9 FL    NRBC 0.0 0  WBC    ABSOLUTE NRBC 0.00 0.00 - 0.01 K/uL    NEUTROPHILS 71 32 - 75 %    LYMPHOCYTES 20 12 - 49 %    MONOCYTES 7 5 - 13 %    EOSINOPHILS 1 0 - 7 %    BASOPHILS 1 0 - 1 %    IMMATURE GRANULOCYTES 0 0.0 - 0.5 %    ABS. NEUTROPHILS 5.0 1.8 - 8.0 K/UL    ABS. LYMPHOCYTES 1.4 0.8 - 3.5 K/UL    ABS. MONOCYTES 0.5 0.0 - 1.0 K/UL    ABS. EOSINOPHILS 0.1 0.0 - 0.4 K/UL    ABS. BASOPHILS 0.0 0.0 - 0.1 K/UL    ABS. IMM.  GRANS. 0.0 0.00 - 0.04 K/UL    DF AUTOMATED     METABOLIC PANEL, COMPREHENSIVE    Collection Time: 11/05/20 11:48 AM   Result Value Ref Range    Sodium 140 136 - 145 mmol/L    Potassium 3.5 3.5 - 5.1 mmol/L    Chloride 106 97 - 108 mmol/L    CO2 29 21 - 32 mmol/L    Anion gap 5 5 - 15 mmol/L Glucose 82 65 - 100 mg/dL    BUN 9 6 - 20 MG/DL    Creatinine 0.74 0.55 - 1.02 MG/DL    BUN/Creatinine ratio 12 12 - 20      GFR est AA >60 >60 ml/min/1.73m2    GFR est non-AA >60 >60 ml/min/1.73m2    Calcium 9.3 8.5 - 10.1 MG/DL    Bilirubin, total 0.3 0.2 - 1.0 MG/DL    ALT (SGPT) 17 12 - 78 U/L    AST (SGOT) 14 (L) 15 - 37 U/L    Alk.  phosphatase 76 45 - 117 U/L    Protein, total 7.7 6.4 - 8.2 g/dL    Albumin 3.7 3.5 - 5.0 g/dL    Globulin 4.0 2.0 - 4.0 g/dL    A-G Ratio 0.9 (L) 1.1 - 2.2     PROTHROMBIN TIME + INR    Collection Time: 11/05/20 11:48 AM   Result Value Ref Range    INR 1.0 0.9 - 1.1      Prothrombin time 10.6 9.0 - 11.1 sec   TROPONIN I    Collection Time: 11/05/20 11:48 AM   Result Value Ref Range    Troponin-I, Qt. <0.05 <0.05 ng/mL   EKG, 12 LEAD, INITIAL    Collection Time: 11/05/20 12:20 PM   Result Value Ref Range    Ventricular Rate 64 BPM    Atrial Rate 64 BPM    P-R Interval 188 ms    QRS Duration 84 ms    Q-T Interval 446 ms    QTC Calculation (Bezet) 460 ms    Calculated P Axis 79 degrees    Calculated R Axis 39 degrees    Calculated T Axis 64 degrees    Diagnosis       Normal sinus rhythm  Nonspecific T wave abnormality  No previous ECGs available

## 2020-11-05 NOTE — PROGRESS NOTES
Spiritual Care Assessment/Progress Note  [facility]      NAME: Brianna Fernando      MRN: [id number]  AGE: 50 y.o. SEX: female  Samaritan Affiliation: [place]atinibout[place]   Language: English     11/5/2020     Total Time (in minutes): 13     Spiritual Assessment begun in [facility] EMERGENCY DEPT through conversation with:         []Patient        [] Family    [] Friend(s)        Reason for Consult: Crisis(Code Stroke)     Spiritual beliefs: (Please include comment if needed)     [] Identifies with a donald tradition:         [] Supported by a donald community:            [] Claims no spiritual orientation:           [] Seeking spiritual identity:                [] Adheres to an individual form of spirituality:           [x] Not able to assess:                           Identified resources for coping:      [] Prayer                               [] Music                  [] Guided Imagery     [] Family/friends                 [] Pet visits     [] Devotional reading                         [x] Unknown     [] Other:                                             Interventions offered during this visit: (See comments for more details)    Patient Interventions: Other (comment)(Pt not available)           Plan of Care:     [] Support spiritual and/or cultural needs    [] Support AMD and/or advance care planning process      [] Support grieving process   [] Coordinate Rites and/or Rituals    [] Coordination with community clergy   [] No spiritual needs identified at this time   [] Detailed Plan of Care below (See Comments)  [] Make referral to Music Therapy  [] Make referral to Pet Therapy     [] Make referral to Addiction services  [] Make referral to [facility]  [] Make referral to Spiritual Care Partner  [] No future visits requested        [x] Follow up visits as needed     Comments: Attempted to visit patient Lambert Carpio in [phone] after responding to CODE stroke page.  Patient was unavailable and not in room during attempted visit. No family was present. Consult with RN leader and [address] [place] [address]. Chaplains will follow as able and/or needed.     Denia 1 TRENTON Morrow 1 Provider   Paging Service [phone])

## 2020-11-05 NOTE — PROGRESS NOTES
SPEECH PATHOLOGY BEDSIDE SWALLOW EVALUATION/DISCHARGE  Patient: Javier Damian (6 y.o. female)  Date: 11/5/2020  Primary Diagnosis: CVA (cerebral vascular accident) ([facility] [address] [I63.9]  S/P admn tPA in diff fac w/n last 24 hr bef adm to crnt fac [Z92.82]       Precautions:        ASSESSMENT :  Patient s/p TPA. Based on the objective data described below, the patient presents with no oral or pharyngeal dysphagia and suspect degree of GERD. Patient with timely mastication of solids. Timely swallow initiation and adequate hyolaryngeal elevation/excursion via palpation. No overt s/s aspiration with any consistency. Patient with globus sensation with PO in chest, feeling as though it is slow moving. She reports taking TUMS for heart burn at home but not on daily GERD meds. Patient with clear speech, feels back to baseline and doesn't feel like she had a stroke. She reports a lot of stress at work. Skilled acute therapy provided by a speech-language pathologist is not indicated at this time. PLAN :  Recommendations:  Regular/thin  meds 1 at a time whole    Discharge Recommendations: None     SUBJECTIVE:   Patient stated I don't think I had a stroke. OBJECTIVE:     Past Medical History:   Diagnosis Date    HTN (hypertension)      Past Surgical History:   Procedure Laterality Date    HX OTHER SURGICAL      Right shoulder     Prior Level of Function/Home Situation:      Diet prior to admission: reg/thin  Current Diet:  npo   Cognitive and Communication Status:  Neurologic State: Alert     Cognition: Follows commands  Perception: Appears intact  Perseveration: No perseveration noted     Oral Assessment:  Oral Assessment  Labial: No impairment  Dentition: Natural  Oral Hygiene: wfl  Lingual: No impairment  Velum: Unable to visualize  Mandible: No impairment  P.O. Trials:  Patient Position: (up in bed)  Vocal quality prior to P.O.: No impairment  Consistency Presented: Thin liquid; Solid;Puree        Bolus Acceptance: No impairment  Bolus Formation/Control: No impairment     Propulsion: No impairment  Oral Residue: None  Initiation of Swallow: No impairment  Laryngeal Elevation: Functional  Aspiration Signs/Symptoms: None  Pharyngeal Phase Characteristics: No impairment, issues, or problems   Effective Modifications: None  Cues for Modifications: None       Oral Phase Severity: No impairment  Pharyngeal Phase Severity : No impairment  NOMS:   The NOMS functional outcome measure was used to quantify this patient's level of swallowing impairment. Based on the NOMS, the patient was determined to be at level 7 for swallow function     NOMS Swallowing Levels:  Level 1 (CN): NPO  Level 2 (CM): NPO but takes consistency in therapy  Level 3 (CL): Takes less than 50% of nutrition p.o. and continues with nonoral feedings; and/or safe with mod cues; and/or max diet restriction  Level 4 (CK): Safe swallow but needs mod cues; and/or mod diet restriction; and/or still requires some nonoral feeding/supplements  Level 5 (CJ): Safe swallow with min diet restriction; and/or needs min cues  Level 6 (CI): Independent with p.o.; rare cues; usually self cues; may need to avoid some foods or needs extra time  Level 7 ([address] [place] [address]): Independent for all p.o.  SAMUEL. (2003). National Outcomes Measurement System (NOMS): Adult Speech-Language Pathology User's Guide. Pain:  Pain Scale 1: Numeric (0 - 10)  Pain Intensity 1: 7  Pain Location 1: Head  After treatment:   Patient left in no apparent distress in bed, Call bell within reach and Nursing notified    COMMUNICATION/EDUCATION:     The patient's plan of care including recommendations, planned interventions, and recommended diet changes were discussed with: Registered nurse.      Thank you for this referral.  CHASITY Leong  Time Calculation: 19 mins

## 2020-11-05 NOTE — CONSULTS
PULMONARY ASSOCIATES [facility] INTENSIVIST Consult Service Note  Pulmonary, Critical Care, and Sleep Medicine    Name: Guero Dominguez MRN: [id number]   : 1975 Hospital: [facility]   Date: 2020  Admission date: 2020 Hospital Day: 1       Subjective/Interval History:   Seen earlier today on rounds. Pt is unstable and acutely ill in the CCU. Patient was re-evaluated multiple times with repeated discussions with CCU team throughout the day    Patient Active Problem List   Diagnosis Code    CVA (cerebral vascular accident) ([facility] [address] I63.9    S/P admn tPA in diff fac w/n last 24 hr bef adm to crnt fac Z92.82       IMPRESSION:   1. Acute CVA s/p TPA; 12:04 PM; CT imaging without large vessel disease  2. Left sided weakness; right handed  3. Uncontrolled chronic HTN   4. Jaw pain- h/o tumor removal  5. Hypothyroidism? 6. Osteoarthritis   7. Obesity Body mass index is 43.58 kg/m². 8. Additional workup outlined below  9. Pt is unstable, unpredictable needing more CCU monitoring; at high risk of sudden decline and decompensation with life threatening consequenses and continued end organ dysfunction and failure  10. Pt is critically ill. Time spent with pt and staff actively rendering care, managing pt and coordinating care as stated below;  35 minutes, exclusive of any procedures      RECOMMENDATIONS/PLAN:   1. CCU monitoring , TPA completed in the ED  2. Continue with neurochecks every 1 hour, will order post TPA CT scan of the brain in the morning  3. Get MRI of the brain, 2D echo, lipid profile and HbA1c  4. Allow permissive hypertension for 24 to 48 hours  5. Hold all p.o. BP meds, labetalol as needed for systolic blood pressure > 180/105 post TPA   6. CTA of the neck did not show any major large vessel occlusion, no need for Doppler of the carotids,   7.  Neuro consulted  PT OT, speech eval, npo for now  Aspirin tomorrow if repeat CT brain   is negative for acute bleeding  Give statin once cleared by speech   MRI brain w/o C - Pending  24 hr CT Head - pending  TTE - Pending  8. Telemetry  9. Bedrest for now  10. DVT, SUP prophylaxis  11. Will be available to assist in medical management while in the CCU pending disposition         Subjective/Initial History:   I have reviewed the flowsheet and previous days notes. Seen earlier today on rounds. I was asked by Cathy Favre, MD to see Osmani Edouard a 50 y.o.  female  in consultation for a chief complaint of CVA s/p TPA    Excerpts from admission 11/5/2020 and consult notes reviewed as follows:     \" Osmani Edouard, 50 y.o. female presents to the ED with medical record history of hypertension and osteoarthritis, possibly asthma, here with difficulty speaking that started around 8:45 AM.  Patient was driven here by a coworker. Patient could relay in triage that she woke up feeling normal and went to work normal.  Speaking with a supervisor at the [facility] on the phone, the [facility] supervisor noted that the patient came to her around [phone] says she was not quite feeling well. She was encouraged to go home but she stated work and then a change was noted at 8:45 AM.  She was driven to the ED by a coworker who we are unable to find or communicate with. Patient describes a headache as well as some jaw pain not on the left jaw but the right side of the jaws where she motions to. She has difficulty, venting a complete history and was transitioned to the CAT scanner for a level 1 code stroke evaluation. He denies any chest pain, shortness of breath or difficulty breathing. She says she has never had symptoms like this before. Review of the medical record does not show she is on any blood thinners. She denies any head trauma. \"    CT Imaging results reviewed. TPA given 12:04 PM. Pt now in CCU. Sluggish verbal response. Left sided HA. Some right jaw pain. H/o tumorr resection. No change vision. Hypertensive.  Cannot recall all her meds. Has not had any this AM.       Patient PCP: Kristian Grimes MD  PMH:  has a past medical history of HTN (hypertension). PSH:   has a past surgical history that includes hx other surgical.   FHX: family history is not on file. SHX:  reports that she has never smoked. She does not have any smokeless tobacco history on file. ROS:A comprehensive review of systems was negative except for that written in the HPI.     Allergies   Allergen Reactions    Betadine [Povidone-Iodine] Hives      MEDS:   Current Facility-Administered Medications   Medication    sodium chloride (NS) flush 5-40 mL    sodium chloride (NS) flush 5-40 mL    ondansetron (ZOFRAN) injection 4 mg    dextrose (D50W) injection syrg 12.5 g    atorvastatin (LIPITOR) tablet 80 mg    acetaminophen (TYLENOL) tablet 650 mg    Or    acetaminophen (TYLENOL) solution 650 mg    Or    acetaminophen (TYLENOL) suppository 650 mg    labetaloL (NORMODYNE;TRANDATE) injection 5 mg        Current Facility-Administered Medications:     sodium chloride (NS) flush 5-40 mL, 5-40 mL, IntraVENous, Q8H, Surendra Rodgers MD, 10 mL at 11/05/20 1501    sodium chloride (NS) flush 5-40 mL, 5-40 mL, IntraVENous, PRN, Gloria Dillard MD    [facility]ron [facility]) injection 4 mg, 4 mg, IntraVENous, Q6H PRN, Gloria Dillard MD, 4 mg at 11/05/20 1501    dextrose (D50W) injection syrg 12.5 g, 12.5 g, IntraVENous, ONCE PRN, Gloria Dillard MD    atorvastatin (LIPITOR) tablet 80 mg, 80 mg, Oral, QHS, Gloria Dillard MD    acetaminophen (TYLENOL) tablet 650 mg, 650 mg, Oral, Q4H PRN **OR** acetaminophen (TYLENOL) solution 650 mg, 650 mg, Per NG tube, Q4H PRN **OR** acetaminophen (TYLENOL) suppository 650 mg, 650 mg, Rectal, Q4H PRN, Gloria Dillard MD    labetaloL (NORMODYNE;TRANDATE) injection 5 mg, 5 mg, IntraVENous, Q10MIN PRN, Gloria Dillard MD      Objective:     Vital Signs: Telemetry:    normal sinus rhythm Intake/Output:   Visit Vitals  BP (!) 143/93 Pulse 63   Temp 98.6 °F (37 °C)   Resp 20   Ht 5' 6\" (1.676 m)   Wt 122.5 kg (270 lb)   SpO2 99%   BMI 43.58 kg/m²       Temp (24hrs), Av.6 °F (37 °C), Min:98.6 °F (37 °C), Max:98.6 °F (37 °C)        O2 Device: Room air           Body mass index is 43.58 kg/m². Wt Readings from Last 4 Encounters:   20 122.5 kg (270 lb)   20 122.5 kg (270 lb)          Intake/Output Summary (Last 24 hours) at 2020 1509  Last data filed at 2020 1357  Gross per 24 hour   Intake 1100 ml   Output    Net 1100 ml       Last shift:       07 -  190  In: 1100 [I.V.:1100]  Out: -   Last 3 shifts: No intake/output data recorded. Physical Exam:    General:  female; in no apparent distress, cooperative and moderately ill;  t  HEENT: NCAT, poor dentition, lips and mucosa dry  Eyes: anicteric; conjunctiva clear  Neck: no nodes, no accessory MM use. Chest: no deformity,   Cardiac: IR regular; no murmur  Lungs: distant breath sounds; no wheezes, no rales, no rhonchi  Abd: soft, NT, hypoactive BS, OBESE  Ext: no edema; no joint swelling; No clubbing  : NO olmos,   Neuro: CNN intact; LUE and LLE motor 3-4/5; slowed mentation but cooperative,  follows commands; lethargic; mild left pronator drift? Sensation intact? Psych- no agitation, oriented to person;   Skin: warm, dry, no cyanosis;   Pulses: 1-2+ Bilateral pedal, radial  Capillary: brisk;       Labs:    Recent Labs     20  1148   WBC 7.0   HGB 12.6   *   INR 1.0     Recent Labs     20  1148      K 3.5      CO2 29   GLU 82   BUN 9   CREA 0.74   CA 9.3   ALB 3.7   ALT 17     No results for input(s): PH, PCO2, PO2, HCO3, FIO2 in the last 72 hours.   Recent Labs     20  1148   TROIQ <0.05     No results found for: BNPP, BNP   No results found for: CULT   No results found for: VANCT, CPK    Imaging:  I have personally reviewed the patients radiographs and have reviewed the reports:    CXR Results (Last 48 hours)    None          Results from Hospital Encounter encounter on 11/05/20   CT CODE NEURO PERF W CBF    Narrative CLINICAL HISTORY: Headache, blurred vision    EXAMINATION:  CT ANGIOGRAPHY HEAD AND NECK, CT PERFUSION    COMPARISON: None    TECHNIQUE:  Following the uneventful administration of iodinated contrast  material, axial CT angiography of the head and neck was performed. Delayed axial  images through the head were also obtained. Coronal and sagittal reconstructions  were obtained. Manual postprocessing of images was performed. 3-D  Sagittal  maximal intensity projection images were obtained. 3-D Coronal maximal  intensity projections were obtained. CT brain perfusion was performed with  generation of hemodynamic maps of multiple parameters, including cerebral blood  flow, cerebral blood volume, mean transit time, and TMAX. CT dose reduction was  achieved through use of a standardized protocol tailored for this examination  and automatic exposure control for dose modulation. This study was analyzed by  the [address] ai algorithm. FINDINGS:    Delayed contrast-enhanced head CT:    The ventricles are midline without hydrocephalus. There is no acute intra or  extra-axial hemorrhage. The basal cisterns are clear. The paranasal sinuses are  clear. CTA NECK:    Great vessels: Normal arch anatomy with the origins patent. Right subclavian artery: Patent    Left subclavian artery: Patent     Right common carotid artery: Patent     Left common carotid artery: Patent     Cervical right internal carotid artery: Patent with no significant stenosis by  NASCET criteria. Cervical left internal carotid artery: Patent with no significant stenosis by  NASCET criteria. Right vertebral artery: Patent    Left vertebral artery: Patent    The lung apices are clear. The thyroid is homogeneous. No cervical  lymphadenopathy.     CTA HEAD:    Right cavernous internal carotid artery: Patent    Left cavernous internal carotid artery: Patent    Anterior cerebral arteries: Patent    Anterior communicating artery: Patent    Right middle cerebral artery: Patent    Left middle cerebral artery: Patent    Posterior communicating arteries: Patent    Posterior cerebral arteries: Patent    Basilar artery: Patent    Distal vertebral arteries: Patent    No evidence for intracranial aneurysm or hemodynamically significant stenosis. CT Perfusion:  Normal CT perfusion. Impression IMPRESSION:  No evidence of large vessel occlusion, perfusion abnormality, or hemodynamically  significant carotid stenosis. During this entire length of time the patient's condition was unstable, unpredictable and critically ill in the CCU/ ICU. I was immediately available to the patient whose care required several interactions with nursing, multidisciplinary team members leading to multiple interventions with fluid resuscitation and medication adjustments to optimize respiratory support, hemodynamic treatment, medication changes based on repeat labs results, reviews, exams and assessments. The reason for providing this level of medical care was due to a critical illness that impaired one or more vital organ systems, such that there was a high probability of sudden or life threatening deterioration in the patient's condition. This care involved high complexity medical decision making to treat acute and unstable vital organ system failure, and to prevent further life threatening deterioration of the patients condition.  I personally:  · Reviewed the flowsheet and previous days notes  · Reviewed and summarized records or history from previous days note or discussions with staff, family  · Parenteral controlled substances - Reviewed/ Adjusted / Link Sanchez / Started  · High Risk Drug therapy requiring intensive monitoring for toxicity: eg steroids, pressors, antibiotics  · Reviewed and/or ordered Clinical lab tests  · Reviewed and/or ordered Radiology tests  · Reviewed and/or ordered of Medicine tests  · Independently visualized radiologic Images  · Reviewed the patients ECG / Telemetry  ·  discussed my assessment/management with : Nursing,  Respiratory Therapy, for coordination of care           Thank you for allowing us to participate in the care of this patient. We will follow along with you until they no longer require CCU services.     Elena Barry MD

## 2020-11-05 NOTE — ED NOTES
11:38 Level 1 code stroke called in Triage. 11:41 Stroke stretcher and Neuro Tele machine to CT  11:43  Pt arrived to CT via wheelchair. 11:45 18g IV to LAC.  11:46 CT of Head  11:49 Tele Neuro MD evaluating pt in CT.   11:53  NS Bolus hung  12:04 TPA Bolus started  12:05 TPA started  12:10 149/83, 71p      13:24  Pt's daughter called and updated. Luisito Abdul [phone].    13:25  Hospitalist at bedside evaluating pt for admission. 14:11 Attempting to call report at this time. 14:20 Bedside report given to [place] .

## 2020-11-05 NOTE — PROGRESS NOTES
Speech pathology  Orders received. Patient BETH. SLP will follow up when appropriate/available.    Thanks,  Sergio Haas M.S. CCC-SLP

## 2020-11-06 ENCOUNTER — APPOINTMENT (OUTPATIENT)
Dept: CT IMAGING | Age: 45
DRG: 103 | End: 2020-11-06
Attending: INTERNAL MEDICINE
Payer: COMMERCIAL

## 2020-11-06 ENCOUNTER — APPOINTMENT (OUTPATIENT)
Dept: NON INVASIVE DIAGNOSTICS | Age: 45
DRG: 103 | End: 2020-11-06
Attending: INTERNAL MEDICINE
Payer: COMMERCIAL

## 2020-11-06 LAB
ANION GAP SERPL CALC-SCNC: 7 MMOL/L (ref 5–15)
ATRIAL RATE: 64 BPM
BUN SERPL-MCNC: 7 MG/DL (ref 6–20)
BUN/CREAT SERPL: 11 (ref 12–20)
CALCIUM SERPL-MCNC: 9 MG/DL (ref 8.5–10.1)
CALCULATED P AXIS, ECG09: 79 DEGREES
CALCULATED R AXIS, ECG10: 39 DEGREES
CALCULATED T AXIS, ECG11: 64 DEGREES
CHLORIDE SERPL-SCNC: 107 MMOL/L (ref 97–108)
CHOLEST SERPL-MCNC: 222 MG/DL
CO2 SERPL-SCNC: 26 MMOL/L (ref 21–32)
CREAT SERPL-MCNC: 0.63 MG/DL (ref 0.55–1.02)
DIAGNOSIS, 93000: NORMAL
ECHO AO ROOT DIAM: 3.45 CM
ECHO AV AREA PEAK VELOCITY: 3.02 CM2
ECHO AV AREA VTI: 3.44 CM2
ECHO AV AREA/BSA PEAK VELOCITY: 1.3 CM2/M2
ECHO AV AREA/BSA VTI: 1.5 CM2/M2
ECHO AV MEAN GRADIENT: 2.7 MMHG
ECHO AV PEAK GRADIENT: 5.81 MMHG
ECHO AV PEAK VELOCITY: 120.53 CM/S
ECHO AV VTI: 24.8 CM
ECHO LA AREA 4C: 14.62 CM2
ECHO LA MAJOR AXIS: 3.7 CM
ECHO LA MINOR AXIS: 1.63 CM
ECHO LA VOL 4C: 35.31 ML (ref 22–52)
ECHO LA VOLUME INDEX A4C: 15.54 ML/M2 (ref 16–28)
ECHO LV E' LATERAL VELOCITY: 14.18 CM/S
ECHO LV E' SEPTAL VELOCITY: 9.98 CM/S
ECHO LV EDV A4C: 125.57 ML
ECHO LV EDV INDEX A4C: 55.3 ML/M2
ECHO LV EJECTION FRACTION A4C: 58 PERCENT
ECHO LV ESV A4C: 53.12 ML
ECHO LV ESV INDEX A4C: 23.4 ML/M2
ECHO LV INTERNAL DIMENSION DIASTOLIC: 4.89 CM (ref 3.9–5.3)
ECHO LV INTERNAL DIMENSION SYSTOLIC: 3.06 CM
ECHO LV IVSD: 0.92 CM (ref 0.6–0.9)
ECHO LV MASS 2D: 189.9 G (ref 67–162)
ECHO LV MASS INDEX 2D: 83.6 G/M2 (ref 43–95)
ECHO LV POSTERIOR WALL DIASTOLIC: 1.2 CM (ref 0.6–0.9)
ECHO LVOT CARDIAC OUTPUT: 3.99 LITER/MINUTE
ECHO LVOT DIAM: 2.11 CM
ECHO LVOT PEAK GRADIENT: 4.37 MMHG
ECHO LVOT PEAK VELOCITY: 104.48 CM/S
ECHO LVOT SV: 85.4 ML
ECHO LVOT VTI: 24.48 CM
ECHO MV A VELOCITY: 53.93 CM/S
ECHO MV AREA PHT: 1.57 CM2
ECHO MV AREA VTI: 2.82 CM2
ECHO MV E DECELERATION TIME (DT): 361.02 MS
ECHO MV E VELOCITY: 61.58 CM/S
ECHO MV E/A RATIO: 1.14
ECHO MV E/E' LATERAL: 4.34
ECHO MV E/E' RATIO (AVERAGED): 5.26
ECHO MV E/E' SEPTAL: 6.17
ECHO MV MAX VELOCITY: 80.8 CM/S
ECHO MV MEAN GRADIENT: 0.72 MMHG
ECHO MV PEAK GRADIENT: 2.61 MMHG
ECHO MV PRESSURE HALF TIME (PHT): 140.07 MS
ECHO MV VTI: 30.26 CM
ECHO PV MAX VELOCITY: 92.6 CM/S
ECHO PV MEAN GRADIENT: 1.6 MMHG
ECHO PV PEAK INSTANTANEOUS GRADIENT SYSTOLIC: 3.43 MMHG
ECHO PV VTI: 21.26 CM
ECHO RA AREA 4C: 17.18 CM2
ERYTHROCYTE [DISTWIDTH] IN BLOOD BY AUTOMATED COUNT: 14.3 % (ref 11.5–14.5)
EST. AVERAGE GLUCOSE BLD GHB EST-MCNC: 105 MG/DL
GLUCOSE SERPL-MCNC: 87 MG/DL (ref 65–100)
HBA1C MFR BLD: 5.3 % (ref 4–5.6)
HCT VFR BLD AUTO: 34.3 % (ref 35–47)
HDLC SERPL-MCNC: 65 MG/DL
HDLC SERPL: 3.4 {RATIO} (ref 0–5)
HGB BLD-MCNC: 11.1 G/DL (ref 11.5–16)
LDLC SERPL CALC-MCNC: 139.6 MG/DL (ref 0–100)
LIPID PROFILE,FLP: ABNORMAL
LVOT MG: 2.06 MMHG
MCH RBC QN AUTO: 30.1 PG (ref 26–34)
MCHC RBC AUTO-ENTMCNC: 32.4 G/DL (ref 30–36.5)
MCV RBC AUTO: 93 FL (ref 80–99)
NRBC # BLD: 0 K/UL (ref 0–0.01)
NRBC BLD-RTO: 0 PER 100 WBC
P-R INTERVAL, ECG05: 188 MS
PLATELET # BLD AUTO: 332 K/UL (ref 150–400)
PMV BLD AUTO: 8.4 FL (ref 8.9–12.9)
POTASSIUM SERPL-SCNC: 3.6 MMOL/L (ref 3.5–5.1)
Q-T INTERVAL, ECG07: 446 MS
QRS DURATION, ECG06: 84 MS
QTC CALCULATION (BEZET), ECG08: 460 MS
RBC # BLD AUTO: 3.69 M/UL (ref 3.8–5.2)
SODIUM SERPL-SCNC: 140 MMOL/L (ref 136–145)
TRIGL SERPL-MCNC: 87 MG/DL (ref ?–150)
TROPONIN I SERPL-MCNC: <0.05 NG/ML
VENTRICULAR RATE, ECG03: 64 BPM
VLDLC SERPL CALC-MCNC: 17.4 MG/DL
WBC # BLD AUTO: 6.1 K/UL (ref 3.6–11)

## 2020-11-06 PROCEDURE — 2709999900 HC NON-CHARGEABLE SUPPLY

## 2020-11-06 PROCEDURE — 94760 N-INVAS EAR/PLS OXIMETRY 1: CPT

## 2020-11-06 PROCEDURE — 74011250637 HC RX REV CODE- 250/637: Performed by: PSYCHIATRY & NEUROLOGY

## 2020-11-06 PROCEDURE — 74011250637 HC RX REV CODE- 250/637: Performed by: INTERNAL MEDICINE

## 2020-11-06 PROCEDURE — 36415 COLL VENOUS BLD VENIPUNCTURE: CPT

## 2020-11-06 PROCEDURE — 70450 CT HEAD/BRAIN W/O DYE: CPT

## 2020-11-06 PROCEDURE — 97530 THERAPEUTIC ACTIVITIES: CPT

## 2020-11-06 PROCEDURE — 93306 TTE W/DOPPLER COMPLETE: CPT

## 2020-11-06 PROCEDURE — 74011250636 HC RX REV CODE- 250/636: Performed by: INTERNAL MEDICINE

## 2020-11-06 PROCEDURE — 97162 PT EVAL MOD COMPLEX 30 MIN: CPT

## 2020-11-06 PROCEDURE — 85027 COMPLETE CBC AUTOMATED: CPT

## 2020-11-06 PROCEDURE — 80048 BASIC METABOLIC PNL TOTAL CA: CPT

## 2020-11-06 PROCEDURE — 84484 ASSAY OF TROPONIN QUANT: CPT

## 2020-11-06 PROCEDURE — 65660000000 HC RM CCU STEPDOWN

## 2020-11-06 PROCEDURE — 74011000250 HC RX REV CODE- 250: Performed by: INTERNAL MEDICINE

## 2020-11-06 PROCEDURE — 94640 AIRWAY INHALATION TREATMENT: CPT

## 2020-11-06 PROCEDURE — 80061 LIPID PANEL: CPT

## 2020-11-06 PROCEDURE — 97116 GAIT TRAINING THERAPY: CPT

## 2020-11-06 PROCEDURE — 99291 CRITICAL CARE FIRST HOUR: CPT | Performed by: PSYCHIATRY & NEUROLOGY

## 2020-11-06 PROCEDURE — 83036 HEMOGLOBIN GLYCOSYLATED A1C: CPT

## 2020-11-06 PROCEDURE — 97165 OT EVAL LOW COMPLEX 30 MIN: CPT

## 2020-11-06 RX ORDER — GUAIFENESIN 100 MG/5ML
81 LIQUID (ML) ORAL DAILY
Status: DISCONTINUED | OUTPATIENT
Start: 2020-11-07 | End: 2020-11-08 | Stop reason: HOSPADM

## 2020-11-06 RX ORDER — ACETAMINOPHEN 325 MG/1
650 TABLET ORAL
Status: DISCONTINUED | OUTPATIENT
Start: 2020-11-06 | End: 2020-11-08 | Stop reason: HOSPADM

## 2020-11-06 RX ORDER — BUTALBITAL, ACETAMINOPHEN AND CAFFEINE 50; 325; 40 MG/1; MG/1; MG/1
1 TABLET ORAL ONCE
Status: COMPLETED | OUTPATIENT
Start: 2020-11-06 | End: 2020-11-06

## 2020-11-06 RX ORDER — ACETAMINOPHEN 650 MG/1
650 SUPPOSITORY RECTAL
Status: DISCONTINUED | OUTPATIENT
Start: 2020-11-06 | End: 2020-11-08 | Stop reason: HOSPADM

## 2020-11-06 RX ORDER — AMLODIPINE BESYLATE 5 MG/1
10 TABLET ORAL DAILY
Status: DISCONTINUED | OUTPATIENT
Start: 2020-11-07 | End: 2020-11-08 | Stop reason: HOSPADM

## 2020-11-06 RX ORDER — ALBUTEROL SULFATE 0.83 MG/ML
2.5 SOLUTION RESPIRATORY (INHALATION)
Status: DISCONTINUED | OUTPATIENT
Start: 2020-11-06 | End: 2020-11-08 | Stop reason: HOSPADM

## 2020-11-06 RX ADMIN — ATORVASTATIN CALCIUM 80 MG: 40 TABLET, FILM COATED ORAL at 23:00

## 2020-11-06 RX ADMIN — ONDANSETRON 4 MG: 2 INJECTION INTRAMUSCULAR; INTRAVENOUS at 11:12

## 2020-11-06 RX ADMIN — ALBUTEROL SULFATE 2.5 MG: 2.5 SOLUTION RESPIRATORY (INHALATION) at 09:47

## 2020-11-06 RX ADMIN — BUTALBITAL, ACETAMINOPHEN, AND CAFFEINE 1 TABLET: 50; 325; 40 TABLET ORAL at 12:42

## 2020-11-06 RX ADMIN — ACETAMINOPHEN 650 MG: 325 TABLET ORAL at 20:01

## 2020-11-06 RX ADMIN — Medication 10 ML: at 06:17

## 2020-11-06 RX ADMIN — Medication 10 ML: at 19:01

## 2020-11-06 RX ADMIN — ACETAMINOPHEN 650 MG: 325 TABLET ORAL at 06:16

## 2020-11-06 RX ADMIN — Medication 10 ML: at 23:00

## 2020-11-06 NOTE — PROGRESS NOTES
[id number]:    6750  Patient transferred from ED to CCU [phone] for management of her stroke and frequent neuro checks. Patient awake and alert. VSS. 1530  Assessment completed and per flow. Patient appears to be anxious. Encouraged patient to focus on staying calm as this will help with her BP and anxiety. 1830  Regular tray per SLP-patient eating all without difficulty. 2000  No change in assessment. No notable aphasia at this time and drift of both left extremities negligible. Patient talking on phone with mother and crying/mother crying. Encouraged both to focus on the lack of deficits and patient's response to treatment. 2100  Patient assisted with bedpan. Able to use but urine unmeasurable. 2330  Report to ADONIS Yanez. NIH exam performed at bedside together for hand off of care.

## 2020-11-06 NOTE — PROGRESS NOTES
Problem: Self Care Deficits Care Plan (Adult)  Goal: *Acute Goals and Plan of Care (Insert Text)  Description:   FUNCTIONAL STATUS PRIOR TO ADMISSION: Pt was independent prior to admission but lives w/ daughter who was assisting pt w/ ADL post rotator cuff surgery in April. Pt was working 10 hr days at [facility] and reported high levels of stress. HOME SUPPORT: Pt lives w/ daughter. Occupational Therapy Goals  Initiated 11/6/2020  1. Patient will perform 1 standing ADL with independence within 7 day(s). 2.  Patient will perform UB and LB dressing with independence within 7 day(s). 3.  Patient will perform all aspects of toileting and transfers with independence within 7 day(s). 4.  Patient will participate in upper extremity and [facility] therapeutic exercises/activities with independence for 10 minutes within 7 day(s). Outcome: Not Met   OCCUPATIONAL THERAPY EVALUATION  Patient: Aron Gardner (1 y.o. female)  Date: 11/6/2020  Primary Diagnosis: CVA (cerebral vascular accident) ([facility] [address] [I63.9]  S/P admn tPA in diff fac w/n last 24 hr bef adm to crnt fac [Z92.82]        Precautions:       ASSESSMENT  Based on the objective data described below, the patient presents with slightly impaired functional mobility, impaired strength and AROM in R shoulder due to past rotator cuff surgery, and mild weakness in L hand [facility] grasp due to pending CVA. The pt reported still feeling unlike her normal self but that symptoms are improving, her speech has less slurring but still slow. The pt also reported tingling in arm has decreased and sensation returned but slight weakness in LUE. When the pt was sitting at EOB to adjust socks she required no assistance but during LE ROM pt appeared to lose balance posteriorly. The pt was CGA for functional mobility and was able to maintain balance walking while talking in depth during conversation.  The pt scored a 63/66 on the Fugl-Falk with most impairments in RUE w/ shoulder mobility and mild weakness during pincer and lateral grasp in L hand. Current Level of Function Impacting Discharge (ADLs/self-care):   Feeding: Setup    Oral Facial Hygiene/Grooming: Supervision;Stand-by assistance    Bathing: Stand-by assistance    Upper Body Dressing: Setup    Lower Body Dressing: Setup;Stand-by assistance    Toileting: Stand by assistance    Functional Outcome Measure: The patient scored Total A-D (Motor Function): 63/66 on the Fugl-Falk outcome measure which is indicative of very minimal impairment. Other factors to consider for discharge: pending MRI for CVA     Patient will benefit from skilled therapy intervention to address the above noted impairments. PLAN :  Recommendations and Planned Interventions: self care training, functional mobility training, and therapeutic exercise    Frequency/Duration: Patient will be followed by occupational therapy 3 times a week to address goals. Recommendation for discharge: (in order for the patient to meet his/her long term goals)  TBD but may not require rehab upon D/C    This discharge recommendation:  A follow-up discussion with the attending provider and/or case management is planned    IF patient discharges home will need the following DME: patient owns DME required for discharge and none       SUBJECTIVE:   Patient stated I have been really stressed.     OBJECTIVE DATA SUMMARY:   HISTORY:   Past Medical History:   Diagnosis Date    HTN (hypertension)      Past Surgical History:   Procedure Laterality Date    HX OTHER SURGICAL      Right shoulder       Expanded or extensive additional review of patient history:     Home Situation  Home Environment: Apartment  # Steps to Enter: 1  Rails to Enter: Yes  Hand Rails : Left  One/Two Story Residence: Two story  # of Interior Steps: 12  Living Alone: No  Support Systems: Family member(s)(daughter)  Patient Expects to be Discharged to[de-identified] Apartment  Current DME Used/Available at Home: Crutches, Cane, straight, Grab bars  Tub or Shower Type: Tub(mostly baths, but leans on toilet seat to get in)    Hand dominance: Right    EXAMINATION OF PERFORMANCE DEFICITS:  Cognitive/Behavioral Status:  Neurologic State: Alert  Orientation Level: Oriented X4  Cognition: Follows commands  Perception: Appears intact     Safety/Judgement: Awareness of environment    Hearing: Auditory  Auditory Impairment: None      Range of Motion:  AROM: Generally decreased, functional(R shoulder due to rotator cuff repair in April )  PROM: Within functional limits                      Strength:  Strength: Generally decreased, functional                Coordination:  Coordination: Within functional limits  Fine Motor Skills-Upper: Right Intact; Left Impaired(mildy impaired pincer and lateral grasp)    Gross Motor Skills-Upper: Right Impaired;Left Intact(due to rotator cuff repair)    Tone & Sensation:  Tone: Normal  Sensation: Impaired(reports tingling on L UE )                      Balance:  Sitting: Intact; Without support  Standing: Impaired; Without support  Standing - Static: Good  Standing - Dynamic : Good    Functional Mobility and Transfers for ADLs:  Bed Mobility:  Rolling: Supervision  Supine to Sit: Supervision  Scooting: Supervision    Transfers:  Sit to Stand: Contact guard assistance  Stand to Sit: Contact guard assistance  Bed to Chair: Contact guard assistance    ADL Assessment:  Feeding: Setup    Oral Facial Hygiene/Grooming: Supervision;Stand-by assistance    Bathing: Stand-by assistance    Upper Body Dressing: Setup    Lower Body Dressing: Setup;Stand-by assistance    Toileting: Stand by assistance                ADL Intervention and task modifications:     Lower Body Dressing Assistance  Socks: Supervision(adjusting socks seated at EOB)      Cognitive Retraining  Safety/Judgement: Awareness of environment    Therapeutic Exercise/Activity:  Pt has functional ROM w/ no noted greater weakness in one arm or the other.   Pt was only slightly limited in pincer grasp and lateral pinch w/ L hand. Functional Measure:  Fugl-Falk Assessment of Motor Recovery after Stroke:     Reflex Activity  Flexors/Biceps/Fingers: Can be elicited  Extensors/Triceps: Can be elicited  Reflex Subtotal: 4    Volitional Movement Within Synergies  Shoulder Retraction: Full  Shoulder Elevation: Full  Shoulder Abduction (90 degrees): Full  Shoulder External Rotation: Full  Elbow Flexion: Full  Forearm Supination: Full  Shoulder Adduction/Internal Rotation: Full  Elbow Extension: Full  Forearm Pronation: Full  Subtotal: 18    Volitional Movement Mixing Synergies  Hand to Lumbar Spine: Partial(RUE limited)  Shoulder Flexion (0-90 degrees): Full  Pronation-Supination: Full  Subtotal: 5    Volitional Movement With Little or No Synergy  Shoulder Abduction (0-90 degrees): Full  Shoulder Flexion ( degrees): Full  Pronation/Supination: Full  Subtotal : 6    Normal Reflex Activity  Biceps, Triceps, Finger Flexors: Full  Subtotal : 2    Upper Extremity Total   Upper Extremity Total: 35    Wrist  Stability at 15 Degree Dorsiflexion: Full  Repeated Dorsiflexion/ Volar Flexion: Full  Stability at 15 Degree Dorsiflexion: Full  Repeated Dorsiflexion/ Volar Flexion: Full  Circumduction: Full  Wrist Total: 10    Hand  Mass Flexion: Full  Mass Extension: Full  Grasp A: Full  Grasp B: Partial  Grasp C: Partial  Grasp D: Full  Grasp E: Full  Hand Total: 12    Coordination/Speed  Tremor: None  Dysmetria: None  Time: <1s  Coordination/Speed Total : 6    Total A-D  Total A-D (Motor Function): 63/66         This is a reliable/valid measure of arm function after a neurological event. It has established value to characterize functional status and for measuring spontaneous and therapy-induced recovery; tests proximal and distal motor functions.  Fugl-Falk Assessment  UE scores recorded between five and 30 days post neurologic event can be used to predict UE recovery at six months post neurologic event. Severe = 0-21 points   Moderately Severe = 22-33 points   Moderate = 34-47 points   Mild = 48-66 points  Tri OLYA Mathews, TAMARA Anthony, & AROLDO Villalobos (1992). Measurement of motor recovery after stroke: Outcome assessment and sample size requirements. Stroke, 23, pp. 3493-2991.   --------------------------------------------------------------------------------------------------------------------------------------------------------------------  MCID:  Stroke:   [place] et al, 2001; n = 171; mean age 79 (6) years; assessed within 16 (12) days of stroke, Acute Stroke)  FMA Motor Scores from Admission to Discharge   10 point increase in FMA Upper Extremity = 1.5 change in discharge FIM   10 point increase in FMA Lower Extremity = 1.9 change in discharge FIM  MDC:   Stroke:   Kristyn Bernstein et al, 2008, n = 14, mean age = 59.9 (14.6) years, assessed on average 14 (6.5) months post stroke, Chronic Stroke)   FMA = 5.2 points for the Upper Extremity portion of the assessment       Occupational Therapy Evaluation Charge Determination   History Examination Decision-Making   LOW Complexity : Brief history review  LOW Complexity : 1-3 performance deficits relating to physical, cognitive , or psychosocial skils that result in activity limitations and / or participation restrictions  LOW Complexity : No comorbidities that affect functional and no verbal or physical assistance needed to complete eval tasks       Based on the above components, the patient evaluation is determined to be of the following complexity level: LOW     Activity Tolerance:   Good    After treatment patient left in no apparent distress:    Sitting in chair, Call bell within reach, and Bed / chair alarm activated    COMMUNICATION/EDUCATION:   The patients plan of care was discussed with: Physical therapist.     Patient/family have participated as able in goal setting and plan of care.     This patients plan of care is appropriate for delegation to [facility]. Thank you for this referral.  Kosta Marcial  Time Calculation: 36 mins   Regarding student involvement in patient care:  A student participated in this treatment session. Per CMS Medicare statements and AOTA guidelines I certify that the following was true:  1. I was present and directly observed the entire session. 2. I made all skilled judgments and clinical decisions regarding care. 3. I am the practitioner responsible for assessment, treatment, and documentation.

## 2020-11-06 NOTE — PROGRESS NOTES
0700: Verbal shift change report given to Sho Riggins RN (oncoming nurse) by [employer], RN (offgoing nurse). Report included the following information SBAR, Kardex, Intake/Output, MAR, Recent Results and Cardiac Rhythm NSR, SB.     0740: Assessment completed; see flowsheets. Q1 NIHSS completed, pt scored a 1 for slight aphasia, delayed speech. Pupils equal and reactive; pt has expiratory wheezing upper lobes. Pt requesting neb tx for daily use. Per night shift report, pt passed SPL speech swallow screen. RN to assess and ask for diet order. All other systems WNL. 0800: RN spoke with CT staff Meg; per staff 24 hour followup CT scan to be done at noon today. 0935: ID rounds held with CCU staff and provider Evan Richardson. Orders for cardiac diet, neb tx and DVT prophylaxis orders placed. Provider aware of CT scan scheduled for noon followup.     0925: Echo staff at bedside. 1115: Pt up to bedside commode to void. Pt stating some dizziness once on commode. Pt c/o nausea. Zofran given prn nausea. 1120: Pt taken off floor with RN x2 for CT scan. Pt c/o dizziness while in CT scan as well. Pt states, \"My left arm feels. .. weird. \" Pt opening and closing hand, squeezing. 1135: Pt returned to bed, episode of emesis. Provider Yariel Sender from neuro at bedside to assess plan of care for pt; updated of status since CT scan. /87.     1300: Pt in bed, alert and oriented. VSS; pt states nausea, dizziness has subsided. NIHSS score of 1.     1410: PT/OT in room to work with pt and provide assessment. Spoke with provider Evan Richardson; received orders for transfer orders. 1433: TRANSFER - OUT REPORT:    Verbal report given to Yunior Camejo RN (name) on Gaby Vang  being transferred to Neuro tele (unit) for routine progression of care       Report consisted of patients Situation, Background, Assessment and   Recommendations(SBAR).      Information from the following report(s) SBAR, Kardex, Intake/Output, MAR, Recent Results and Cardiac Rhythm NSR, SB was reviewed with the receiving nurse. Lines:   Peripheral IV 11/05/20 Left Antecubital (Active)   Site Assessment Clean, dry, & intact 11/06/20 1230   Phlebitis Assessment 0 11/06/20 1230   Infiltration Assessment 0 11/06/20 1230   Dressing Status Clean, dry, & intact 11/06/20 1230   Dressing Type Transparent;Tape 11/06/20 1230   Hub Color/Line Status Green;Capped;Flushed;Patent 11/06/20 1230   Action Taken Open ports on tubing capped 11/06/20 0740   Alcohol Cap Used Yes 11/06/20 1230       Peripheral IV 11/05/20 Right Antecubital (Active)   Site Assessment Clean, dry, & intact 11/06/20 1230   Phlebitis Assessment 0 11/06/20 1230   Infiltration Assessment 0 11/06/20 1230   Dressing Status Clean, dry, & intact 11/06/20 1230   Dressing Type Transparent;Tape 11/06/20 1230   Hub Color/Line Status Pink;Capped;Flushed;Patent 11/06/20 1230   Action Taken Open ports on tubing capped 11/06/20 0740   Alcohol Cap Used Yes 11/06/20 1230        Opportunity for questions and clarification was provided.       Patient transported with:   Monitor  Registered Nurse

## 2020-11-06 NOTE — PROGRESS NOTES
Problem: Mobility Impaired (Adult and Pediatric)  Goal: *Acute Goals and Plan of Care (Insert Text)  Description:   FUNCTIONAL STATUS PRIOR TO ADMISSION: Patient was independent and active without use of DME.    HOME SUPPORT PRIOR TO ADMISSION: The patient lived with her daughter who works. Physical Therapy Goals  Initiated 11/6/2020  1. Patient will move from supine to sit and sit to supine , scoot up and down, and roll side to side in bed with independence within 7 day(s). 2.  Patient will transfer from bed to chair and chair to bed with independence using the least restrictive device within 7 day(s). 3.  Patient will perform sit to stand with independence within 7 day(s). 4.  Patient will ambulate with independence for 200 feet with the least restrictive device within 7 day(s). 5.  Patient will ascend/descend 12 stairs with one sided handrail(s) with modified independence within 7 day(s). 6.  Patient will improve Thurston Balance score by 7 points within 7 days. Outcome: Not Met   PHYSICAL THERAPY EVALUATION- NEURO POPULATION  Patient: Yvan Cash (20 y.o. female)  Date: 11/6/2020  Primary Diagnosis: CVA (cerebral vascular accident) ([facility] [address] [I63.9]  S/P admn tPA in diff fac w/n last 24 hr bef adm to crnt fac [Z92.82]        Precautions:            ASSESSMENT  Based on the objective data described below, the patient presents with decreased strength, decreased functional mobility, impaired balance, and mildly unsteady gait following admission for CVA. Patient received TPA ending on 11/5 at . Patient cleared for mobility. She required CGA for all functional mobility. She has inconsistent MMT with functional strength, noting 2/5 strength although no buckling with ambulation. Patient ambulated in the hallway without AD with minimal gait deviations, mildly antalgic on LLE. Patient left sitting in the chair at the conclusion of PT evaluation.      Current Level of Function Impacting Discharge (mobility/balance): CGA without AD. Functional Outcome Measure: The patient scored Total: 41/56 on the Thurston Balance Assessment which is indicative of moderate fall risk. Other factors to consider for discharge: stressed, L weak, MRI pending     Patient will benefit from skilled therapy intervention to address the above noted impairments. PLAN :  Recommendations and Planned Interventions: bed mobility training, transfer training, gait training, therapeutic exercises, neuromuscular re-education, patient and family training/education and therapeutic activities      Frequency/Duration: Patient will be followed by physical therapy:  4 times a week to address goals. Recommendation for discharge: (in order for the patient to meet his/her long term goals)  OP PT vs none pending progress     This discharge recommendation:  Has not yet been discussed the attending provider and/or case management    IF patient discharges home will need the following DME: none         SUBJECTIVE:   Patient stated I have had a lot of stress recently.     OBJECTIVE DATA SUMMARY:   HISTORY:    Past Medical History:   Diagnosis Date    HTN (hypertension)      Past Surgical History:   Procedure Laterality Date    HX OTHER SURGICAL      Right shoulder       Personal factors and/or comorbidities impacting plan of care: CVA workup, s/p TPA, stress    Home Situation  Home Environment: Apartment  # Steps to Enter: 1  Rails to Enter: Yes  Hand Rails : Left  One/Two Story Residence: Two story  # of Interior Steps: 12  Living Alone: No  Support Systems: Family member(s)(daughter)  Patient Expects to be Discharged to[de-identified] Apartment  Current DME Used/Available at Home: Crutches, Cane, straight, Grab bars  Tub or Shower Type: Tub(mostly baths, but leans on toilet seat to get in)    EXAMINATION/PRESENTATION/DECISION MAKING:   Critical Behavior:  Neurologic State: Alert  Orientation Level: Oriented X4  Cognition: Follows commands Hearing: Auditory  Auditory Impairment: None  Skin:    Edema:   Range Of Motion:  AROM: Generally decreased, functional(R shoulder due to rotator cuff repair ini April )           PROM: Within functional limits           Strength:    Strength: Generally decreased, functional                    Tone & Sensation:   Tone: Normal              Sensation: Impaired(reports tingling on L UE )               Coordination:  Coordination: Within functional limits  Vision:      Functional Mobility:  Bed Mobility:  Rolling: Supervision  Supine to Sit: Supervision     Scooting: Supervision  Transfers:  Sit to Stand: Contact guard assistance  Stand to Sit: Contact guard assistance        Bed to Chair: Contact guard assistance              Balance:   Sitting: Intact; Without support  Standing: Impaired; Without support  Standing - Static: Good  Standing - Dynamic : Good  Ambulation/Gait Training:  Distance (ft): 150 Feet (ft)  Assistive Device: Gait belt  Ambulation - Level of Assistance: Contact guard assistance     Gait Description (WDL): Exceptions to WDL  Gait Abnormalities: Decreased step clearance;Shuffling gait        Base of Support: Widened     Speed/Jany: Pace decreased (<100 feet/min)  Step Length: Right shortened;Left shortened                     Functional Measure  Thurston Balance Test:    Sitting to Standin  Standing Unsupported: 4  Sitting with Back Unsupported: 4  Standing to Sittin  Transfers: 4  Standing Unsupported with Eyes Closed: 4  Standing Unsupported with Feet Together: 4  Reach Forward with Outstretched Arm: 4   Object: 3  Turn to Look Over Shoulders: 3  Turn 360 Degrees: 3  Alternate Foot on Step/Stool: 0  Standing Unsupported One Foot in Front: 0  Stand on One Le  Total: 41/56         56=Maximum possible score;   0-20=High fall risk  21-40=Moderate fall risk   41-56=Low fall risk        Physical Therapy Evaluation Charge Determination   History Examination Presentation Decision-Making MEDIUM  Complexity : 1-2 comorbidities / personal factors will impact the outcome/ POC  MEDIUM Complexity : 3 Standardized tests and measures addressing body structure, function, activity limitation and / or participation in recreation  MEDIUM Complexity : Evolving with changing characteristics  Other outcome measures Thurston   MEDIUM      Based on the above components, the patient evaluation is determined to be of the following complexity level: MEDIUM        Activity Tolerance:   Good and SpO2 stable on RA      After treatment patient left in no apparent distress:   Sitting in chair and Call bell within reach    COMMUNICATION/EDUCATION:   The patients plan of care was discussed with: Occupational therapist, Registered nurse and Case management. Fall prevention education was provided and the patient/caregiver indicated understanding., Patient/family have participated as able in goal setting and plan of care. and Patient/family agree to work toward stated goals and plan of care.     Thank you for this referral.  Deedee Greer, PT, DPT   Time Calculation: 30 mins

## 2020-11-06 NOTE — PROGRESS NOTES
[facility] INTENSIVIST Consult Service Note  Pulmonary, Critical Care, and Sleep Medicine    Name: Brielle Carreon MRN: [id number]   : 1975 Hospital: [address] [facility]   Date: 2020  Admission date: 2020 Hospital Day: 2       Subjective/Interval History:     20: no events. Speech still slow, but otherwise had an uneventful night. IMPRESSION:   1. Acute CVA s/p TPA; 12:04 PM; CT imaging without large vessel disease  2. Left sided weakness; right handed  3. Uncontrolled chronic HTN   4. Jaw pain- h/o tumor removal  5. Hypothyroidism? 6. Osteoarthritis   7. Obesity       RECOMMENDATIONS/PLAN:   1. On room air  2. Repeat head CT at 1 pm today  3. BP control  4. Neuro consulted  PT OT, speech   5. Continue post-stroke care  6. DVT prophylaxis  7. To floor later today if head CT without bleeding         Subjective/Initial History:   I have reviewed the flowsheet and previous days notes. Seen earlier today on rounds. I was asked by Mayra López MD to see Brielle Carreon a 50 y.o.  female  in consultation for a chief complaint of CVA s/p TPA    Excerpts from admission 2020 and consult notes reviewed as follows:     \" Brielle Carreon, 50 y.o. female presents to the ED with medical record history of hypertension and osteoarthritis, possibly asthma, here with difficulty speaking that started around 8:45 AM.  Patient was driven here by a coworker. Patient could relay in triage that she woke up feeling normal and went to work normal.  Speaking with a supervisor at the [facility] on the phone, the [facility] supervisor noted that the patient came to her around [phone] says she was not quite feeling well. She was encouraged to go home but she stated work and then a change was noted at 8:45 AM.  She was driven to the ED by a coworker who we are unable to find or communicate with.   Patient describes a headache as well as some jaw pain not on the left jaw but the right side of the jaws where she motions to. She has difficulty, venting a complete history and was transitioned to the CAT scanner for a level 1 code stroke evaluation. He denies any chest pain, shortness of breath or difficulty breathing. She says she has never had symptoms like this before. Review of the medical record does not show she is on any blood thinners. She denies any head trauma. \"    CT Imaging results reviewed. TPA given 12:04 PM. Pt now in CCU. Sluggish verbal response. Left sided HA. Some right jaw pain. H/o tumorr resection. No change vision. Hypertensive. Cannot recall all her meds. Has not had any this AM.       Patient PCP: Micheline Temple MD  PMH:  has a past medical history of HTN (hypertension). PSH:   has a past surgical history that includes hx other surgical.   FHX: family history is not on file. SHX:  reports that she has never smoked. She does not have any smokeless tobacco history on file. ROS:A comprehensive review of systems was negative except for that written in the HPI.     Allergies   Allergen Reactions    Betadine [Povidone-Iodine] Hives      MEDS:   Current Facility-Administered Medications   Medication    sodium chloride (NS) flush 5-40 mL    sodium chloride (NS) flush 5-40 mL    ondansetron (ZOFRAN) injection 4 mg    dextrose (D50W) injection syrg 12.5 g    atorvastatin (LIPITOR) tablet 80 mg    acetaminophen (TYLENOL) tablet 650 mg    Or    acetaminophen (TYLENOL) solution 650 mg    Or    acetaminophen (TYLENOL) suppository 650 mg    labetaloL (NORMODYNE;TRANDATE) injection 5 mg        Current Facility-Administered Medications:     sodium chloride (NS) flush 5-40 mL, 5-40 mL, IntraVENous, Q8H, Surendra Maloney MD, 10 mL at 11/06/20 0617    sodium chloride (NS) flush 5-40 mL, 5-40 mL, IntraVENous, PRN, Tatiana Coto MD    ondansetron [facility]) injection 4 mg, 4 mg, IntraVENous, Q6H PRN, Tatiana Coto MD, 4 mg at 11/05/20 1501   dextrose (D50W) injection syrg 12.5 g, 12.5 g, IntraVENous, ONCE PRN, Braxton Hyatt MD    atorvastatin (LIPITOR) tablet 80 mg, 80 mg, Oral, QHS, Braxton Hyatt MD, 40 mg at 20 2208    acetaminophen (TYLENOL) tablet 650 mg, 650 mg, Oral, Q4H PRN, 650 mg at 20 0616 **OR** acetaminophen (TYLENOL) solution 650 mg, 650 mg, Per NG tube, Q4H PRN **OR** acetaminophen (TYLENOL) suppository 650 mg, 650 mg, Rectal, Q4H PRN, Braxton Hyatt MD    labetaloL (NORMODYNE;TRANDATE) injection 5 mg, 5 mg, IntraVENous, Q10MIN PRN, Braxton Hyatt MD      Objective:     Vital Signs: Telemetry:    normal sinus rhythm Intake/Output:   Visit Vitals  /78 (BP 1 Location: Right arm, BP Patient Position: At rest)   Pulse (!) 55   Temp 97.3 °F (36.3 °C)   Resp 13   Ht 5' 6\" (1.676 m)   Wt 122.5 kg (270 lb)   SpO2 96%   BMI 43.58 kg/m²       Temp (24hrs), Av °F (36.7 °C), Min:97.3 °F (36.3 °C), Max:98.6 °F (37 °C)        O2 Device: Room air           Body mass index is 43.58 kg/m². Wt Readings from Last 4 Encounters:   20 122.5 kg (270 lb)   20 122.5 kg (270 lb)          Intake/Output Summary (Last 24 hours) at 2020 0724  Last data filed at 2020 1730  Gross per 24 hour   Intake 1231 ml   Output 500 ml   Net 731 ml       Last shift:      No intake/output data recorded.   Last 3 shifts:  1901 -  0700  In: 18 [P.O.:50; I.V.:1181]  Out: 500 [Urine:500]         Physical Exam:    General:  female; in no apparent distress   HEENT: NCAT, MMM  Eyes: anicteric; conjunctiva clear  Chest: no deformity,   Cardiac: IR regular; no murmur  Lungs: distant breath sounds; no wheezes, no rales, no rhonchi  Abd: soft, NT,+ BS   Ext: no edema; no joint swelling   Neuro: slowed mentation but cooperative, no obvious localizing findings  Psych- no agitation     Data:   Labs:  Recent Labs     208 20  1148   WBC 6.1 7.0   HGB 11.1* 12.6   HCT 34.3* 39.1    408* Recent Labs     11/06/20  0448 11/05/20  1148    140   K 3.6 3.5    106   CO2 26 29   GLU 87 82   BUN 7 9   CREA 0.63 0.74   CA 9.0 9.3   ALB  --  3.7   TBILI  --  0.3   ALT  --  17   INR  --  1.0     No results for input(s): PHI, PCO2I, PO2I, HCO3I, FIO2I in the last 72 hours.     Imaging:  I have personally reviewed the patients radiographs:  Pending         Lizette Traylor MD

## 2020-11-06 NOTE — PROGRESS NOTES
PT note:     Orders received and acknowledged. Chart reviewed and spoke with nursing. Patient received TPA ending at . Will hold for 24 hours as she is on bedrest so will follow up in PM for PT evaluation.      Emeli Schmidt, PT, DPT

## 2020-11-06 NOTE — PROGRESS NOTES
2340- Report taken from [facility]. Patient is in bed resting quietly. Will continue to monitor and do frequent neuro checks. 9224- Patient continues to do well throughout neuro checks, NIH score is 1. Patient is resting between checks. 0600- Patient has used the bed pan. Patient complains of a headache, tylenol has been given.     0700- Patient SBAR given to Shawna LAMB

## 2020-11-06 NOTE — PROGRESS NOTES
Defer OT eval till PM.    Pt received Alteplase at 1305 yesterday 11/5/2020. Waiting 24 hrs for OT eval at a later time.

## 2020-11-06 NOTE — PROGRESS NOTES
Hospitalist Progress Note    NAME: Cordelia Gates   :  1975   MRN:  [id number]       Assessment / Plan:  Acute ischemic CVA POA  -Patient presented with dysarthria and left-sided weakness and received TPA in the ED , symptoms are patient is 24 hours out of TPA administration  -Improving symptoms   -We will restart aspirin 81 mg and start amlodipine 10 mg for blood pressure control  -Repeat CT of the head is normal   -MRI of the head pending  -continue statin 80 mg   -LDL is 139  -A1c is 5.3  -PT/OT , SLP    Echo:  · LV: Estimated LVEF is 55 - 60%. Normal cavity size, wall thickness and systolic function (ejection fraction normal). · Saline contrast was given to evaluate for intracardiac shunt. Morbid Obesity: Due to excess caloric intake    40 or above Morbid obesity / Body mass index is 43.58 kg/m². Code status: Full  Prophylaxis: SCD's  Recommended Disposition: Home w/Family     Subjective:     Chief Complaint / Reason for Physician Visit  \"Symptoms of dysarthria and left-sided weakness are improving, denies any diplopia, dysphagia or gait abnormality. \". Discussed with RN events overnight. Review of Systems:  Symptom Y/N Comments  Symptom Y/N Comments   Fever/Chills n   Chest Pain n    Poor Appetite n   Edema n    Cough n   Abdominal Pain n    Sputum n   Joint Pain n    SOB/HAHN n   Pruritis/Rash     Nausea/vomit n   Tolerating PT/OT     Diarrhea n   Tolerating Diet     Constipation n   Other       Could NOT obtain due to:      Objective:     VITALS:   Last 24hrs VS reviewed since prior progress note.  Most recent are:  Patient Vitals for the past 24 hrs:   Temp Pulse Resp BP SpO2   20 1417  64  (!) 177/101    20 1412  60  (!) 168/96    20 1400  67 16  99 %   20 1330  60 14 (!) 150/85 99 %   20 1315  (!) 45 13 130/77 100 %   20 1300  (!) 45 16 137/69 99 %   20 1245  63 20 126/77 99 %   20 1230 98.1 °F (36.7 °C) (!) 49 13 133/70 97 % 11/06/20 1215  63 14 127/68    11/06/20 1200  69 17 (!) 150/79 99 %   11/06/20 1143  81 17 (!) 140/87    11/06/20 1116  (!) 59 11 137/77 98 %   11/06/20 1100  66 15 133/75    11/06/20 1030  (!) 56 14 (!) 145/72 98 %   11/06/20 1000  60 16 (!) 141/78 97 %   11/06/20 0947     97 %   11/06/20 0930  (!) 53 18 (!) 140/80 97 %   11/06/20 0917    136/87    11/06/20 0900  (!) 48 16 136/87 95 %   11/06/20 0830  (!) 59 14 117/62 97 %   11/06/20 0800 98.5 °F (36.9 °C) (!) 50 25 122/71 98 %   11/06/20 0730  66 18 123/71 100 %   11/06/20 0700  (!) 48 13 127/74 96 %   11/06/20 0600  (!) 55 13 124/78 96 %   11/06/20 0503  61 14  96 %   11/06/20 0502  66 14  96 %   11/06/20 0501  73 16  94 %   11/06/20 0500 97.3 °F (36.3 °C) 65 15 117/78 96 %   11/06/20 0459  63 17  95 %   11/06/20 0400  65 15 120/65 96 %   11/06/20 0300  (!) 52 16 120/67 96 %   11/06/20 0200  (!) 56 15 112/72 96 %   11/06/20 0130  (!) 55 14 115/75 96 %   11/06/20 0100  (!) 51 11 136/76 96 %   11/06/20 0030  (!) 49 16 126/66 95 %   11/06/20 0000 97.7 °F (36.5 °C) (!) 49 15 117/60 95 %   11/05/20 2300  (!) 50 15 113/61 96 %   11/05/20 2200  (!) 49 13 105/67 95 %   11/05/20 2100  (!) 47 15 (!) 111/59 95 %   11/05/20 2045  60 15 117/71 97 %   11/05/20 2030  (!) 59 19 130/64 98 %   11/05/20 2015  (!) 57 16 130/76    11/05/20 2000 98.2 °F (36.8 °C) (!) 59 14 123/76    11/05/20 1945  (!) 56 13 126/75    11/05/20 1930  60 13 114/73    11/05/20 1915  64 15     11/05/20 1900  (!) 59 16 117/69 98 %   11/05/20 1845  (!) 55 15 115/75 98 %   11/05/20 1830  (!) 55 16 116/68    11/05/20 1815  62 16 107/70 99 %   11/05/20 1800  (!) 59 16  100 %   11/05/20 1745  64 14 126/65 100 %   11/05/20 1730  60 12 124/63 99 %   11/05/20 1715  (!) 57 17 120/68 100 %   11/05/20 1700  (!) 50 14 122/71 100 %   11/05/20 1645  (!) 51 16  100 %   11/05/20 1630  65 19 100/88 99 %   11/05/20 1615  (!) 57 14 121/81 96 %   11/05/20 1600  63 16 112/71 100 %   11/05/20 1545  60 14 115/71 100 %   11/05/20 1530  (!) 57 14 125/73 99 %       Intake/Output Summary (Last 24 hours) at 11/6/2020 1515  Last data filed at 11/6/2020 1230  Gross per 24 hour   Intake 371 ml   Output 500 ml   Net -129 ml        PHYSICAL EXAM:  General: WD, WN. Alert, cooperative, no acute distress    EENT:  EOMI. Anicteric sclerae. MMM  Resp:  CTA bilaterally, no wheezing or rales. No accessory muscle use  CV:  Regular  rhythm,  No edema  GI:  Soft, Non distended, Non tender.  +Bowel sounds  Neurologic:  Alert and oriented X 3, normal speech,   Psych:   Good insight. Not anxious nor agitated  Skin:  No rashes. No jaundice    Reviewed most current lab test results and cultures  YES  Reviewed most current radiology test results   YES  Review and summation of old records today    NO  Reviewed patient's current orders and MAR    YES  PMH/ reviewed - no change compared to H&P  ________________________________________________________________________  Care Plan discussed with:    Comments   Patient x    Family      RN x    Care Manager     Consultant                        Multidiciplinary team rounds were held today with , nursing, pharmacist and clinical coordinator. Patient's plan of care was discussed; medications were reviewed and discharge planning was addressed. ________________________________________________________________________  Total NON critical care TIME:  32   Minutes    Total CRITICAL CARE TIME Spent:   Minutes non procedure based      Comments   >50% of visit spent in counseling and coordination of care     ________________________________________________________________________  Chrystal Parker MD     Procedures: see electronic medical records for all procedures/Xrays and details which were not copied into this note but were reviewed prior to creation of Plan. LABS:  I reviewed today's most current labs and imaging studies.   Pertinent labs include:  Recent Labs     11/06/20  0448 11/05/20  1148   WBC 6.1 7.0   HGB 11.1* 12.6   HCT 34.3* 39.1    408*     Recent Labs     11/06/20  0448 11/05/20  1148    140   K 3.6 3.5    106   CO2 26 29   GLU 87 82   BUN 7 9   CREA 0.63 0.74   CA 9.0 9.3   ALB  --  3.7   TBILI  --  0.3   ALT  --  17   INR  --  1.0       Signed: Chrystal Parker MD

## 2020-11-06 NOTE — CONSULTS
Date of Consultation:  November 6, 2020    Referring Physician: ? Stroke      Reason for Consultation:  Francisco Rodriguez MD     Chief Complaint   Patient presents with    Headache     pt complains of acute onset of headache, left jaw pain, blurry vision and difficulty speaking that came on at work today, but she is unsure of the time. She knows she woke up normal this morning       History of Present Illness:   Yonis Shepard is a 50 y.o. female with hx of hypertension who is currently admitted to the hospital after she developed acute onset slurred speech and left-sided weakness. Patient reports that she was at her baseline state of health and she suddenly does describe having some difficulty with speaking. She additionally had some slight weakness in her left side specifically her left upper extremity. This was also associated with some numbness on that side. She did report that she had significant headache as well as jaw pain on the right side of her head and face. Patient was evaluated in the emergency room and given TPA with improvement of her symptoms. Of note patient does complain of memory issues that have been ongoing for the last few months. She had been seen by orthopedic surgeon who prescribes the medications for her for pain however she felt like they were causing her to have memory issues. She reports that she stopped this medication however continued to have issues with her memory. She describes this as intermittently forgetting her address or where she is going or what she is doing. Example she reports that when she went to go vote a week ago she was unable to remember her address. Past Medical History:   Diagnosis Date    HTN (hypertension)         Past Surgical History:   Procedure Laterality Date    HX OTHER SURGICAL      Right shoulder        No family history on file.      Social History     Tobacco Use    Smoking status: Never Smoker   Substance Use Topics    Alcohol use: Not on file        Allergies   Allergen Reactions    Betadine [Povidone-Iodine] Hives        Prior to Admission medications    Medication Sig Start Date End Date Taking? Authorizing Provider   naproxen (NAPROSYN) 500 mg tablet Take 1 Tab by mouth every twelve (12) hours as needed for Pain. 8/27/20   Alena Scott MD   cyclobenzaprine (FLEXERIL) 10 mg tablet Take 1 Tab by mouth three (3) times daily as needed for Muscle Spasm(s). 8/27/20   Alena Scott MD       Review of Systems:    General, constitutional: negative  Eyes, vision: negative  Ears, nose, throat: negative  Cardiovascular, heart: negative  Respiratory: negative  Gastrointestinal: negative  Genitourinary: negative  Musculoskeletal: negative  Skin and integumentary: negative  Psychiatric: negative  Endocrine: negative  Neurological: negative, except for HPI  Hematologic/lymphatic: negative  Allergy/immunology: negative    PHYSICAL EXAMINATION:   Visit Vitals  BP (!) 140/80   Pulse (!) 53   Temp 98.5 °F (36.9 °C)   Resp 18   Ht 5' 6\" (1.676 m)   Wt 270 lb (122.5 kg)   SpO2 97%   BMI 43.58 kg/m²       Physical Exam:  General:  no acute distress  Neck: no carotid bruits  Lungs: clear to auscultation  Heart:  no murmurs, regular rate and rhythm   Lower extremity: no edema    Neurological exam:  Mental Status: Awake, alert, oriented to person, place and time  Attention and Concentration: able to state the days of the week backwards   Speech and Language: No dysarthria.  Able to name, repeat and follow commands   Fund of knowledge was preserved    Cranial nerves: II-XII  Pupils equal and reactive, visual fields intact by confrontation   Extraocular movements intact, no evidence of nystagmus or ptosis   Facial sensation intact   Facial movements symmetric   Hearing intact to soft rub bilaterally   Shoulder shrug symmetric and strong   Tongue protrusion full and midline without fasciculation or atrophy    Motor:   Normal tone and Bulk   Drift: No evidence of pronator drift     Strength testing:   deltoid triceps biceps Wrist ext. Wrist flex. intrinsics   Right 5 5 5 5 5 5   Left 5 5 5 5 5 5      Hip flex. Hip ext. Knee ext. Knee flex Dorsi flex Plantar flex   Right  5 5 5 5 5 5   Left  5 5 5 5 5 5       Sensory:  Reports decrease in sensation on the left arm however could not quantify the degree of sensation loss. She just reports it feels \"different\". Sensation was intact in her lower extremities. Reflexes:   Biceps Triceps  Brachiorad Patellar Achilles Plantar Hoffmans   Right  2 2 2 2 2 Down Neg   Left  2 2 2 2 2 Down Neg      Cerebellar testing:  No dysmetria. finger-to-nose and heel-to- shin testing are within normal limits. Gait: Deferred as she just had an episode of emesis. LAB DATA REVIEWED:    Lab Results   Component Value Date/Time    Sodium 140 11/06/2020 04:48 AM    Potassium 3.6 11/06/2020 04:48 AM    Chloride 107 11/06/2020 04:48 AM    Glucose 87 11/06/2020 04:48 AM    BUN 7 11/06/2020 04:48 AM    Creatinine 0.63 11/06/2020 04:48 AM    Calcium 9.0 11/06/2020 04:48 AM    WBC 6.1 11/06/2020 04:48 AM    HCT 34.3 (L) 11/06/2020 04:48 AM    HGB 11.1 (L) 11/06/2020 04:48 AM    PLATELET 362 47/51/8823 04:48 AM       Stroke workup    MRI Brain  Pending    CTA head and neck  Independently reviewed and showed no atherosclerotic disease in the anterior and posterior circulation. There is no large vessel occlusion or flow-limiting stenosis.     TTE:   Pending    Stroke labs:    HgBA1c    Lab Results   Component Value Date/Time    Hemoglobin A1c 5.3 11/06/2020 04:48 AM       LDL   Lab Results   Component Value Date/Time    LDL, calculated 139.6 (H) 11/06/2020 04:48 AM       EKG: Normal sinus rhythm      Assessment and Plan:   Yemi Vivas is a 50 y.o. female with hx of hypertension who is currently admitted to the hospital after she developed acute onset slurred speech and left-sided weakness now with significant improvement in her weakness as well as slurred speech. Etiology is likely due to subcortical stroke versus TIA as she does have risk factors such as hypertension and hyperlipidemia. There is a possibility that the symptoms are nonphysiologic in nature as she did have some give way weakness in her left upper extremity. Left upper extremity weakness as well as slurred speech: Etiology is unclear however this may be vascular in nature given her history of hypertension and hyperlipidemia.  - Recommend maintaining SBP <180/105 for 24 hrs from symptom onset and then BP goal is less than 140/90 (this is the long term goal)   -Hold off on any antiplatelet and anticoagulation for 24 hours after TPA administration. Repeat head CT is performed which shows no evidence of hemorrhage, patient can be started on aspirin 81 mg daily for secondary stroke prevention.  -Need a head CT at 24 hours after TPA administration.  - Risk factor modification: LDL is 139 and hemoglobin A1c is 5.3%              - if LDL > 70, would start atorvastatin 40mg daily   - Would obtain MRI brain without contrast to rule out acute ischemia   - please obtain TTE to rule out intracardiac thrombus   - Based on the work-up while in the hospital patient may need a 30-day event monitor to rule out atrial fibrillation as a potential etiology of her stroke. - As an outpatient patient may also need a hypercoagulable work-up pending the results of her MRI. - would monitor on telemetry to rule out arrhythmias      Hypertension:   - Recommend maintaining SBP <180/105 for 24 hrs from symptom onset and then BP goal is less than 140/90 (this is the long term goal)   -Resume home medication in a stepwise fashion. We discussed extensively the importance of lifestyle modification including smoking cessation, diet, and incorporating exercise into daily routine. Thank you for the consult, continue to follow. Please call with any additional questions.     Kristina Dennis MD     45 minutes was spent providing medical care of this critically ill patient reviewing records, obtaining additional history from family, examining patient , discussing with collaborating physicians and nursing, and discussing treatment plans.

## 2020-11-07 ENCOUNTER — APPOINTMENT (OUTPATIENT)
Dept: MRI IMAGING | Age: 45
DRG: 103 | End: 2020-11-07
Attending: INTERNAL MEDICINE
Payer: COMMERCIAL

## 2020-11-07 PROBLEM — G45.1 TRANSIENT ISCHEMIC ATTACK INVOLVING RIGHT INTERNAL CAROTID ARTERY: Status: ACTIVE | Noted: 2020-11-07

## 2020-11-07 PROBLEM — I65.23 BILATERAL CAROTID ARTERY STENOSIS: Status: ACTIVE | Noted: 2020-11-07

## 2020-11-07 PROBLEM — G44.209 TENSION VASCULAR HEADACHE: Status: ACTIVE | Noted: 2020-11-07

## 2020-11-07 LAB — TSH SERPL DL<=0.05 MIU/L-ACNC: 78.1 UIU/ML (ref 0.36–3.74)

## 2020-11-07 PROCEDURE — 65660000000 HC RM CCU STEPDOWN

## 2020-11-07 PROCEDURE — 74011000250 HC RX REV CODE- 250: Performed by: INTERNAL MEDICINE

## 2020-11-07 PROCEDURE — 74011250637 HC RX REV CODE- 250/637: Performed by: INTERNAL MEDICINE

## 2020-11-07 PROCEDURE — 99233 SBSQ HOSP IP/OBS HIGH 50: CPT | Performed by: PSYCHIATRY & NEUROLOGY

## 2020-11-07 PROCEDURE — 36415 COLL VENOUS BLD VENIPUNCTURE: CPT

## 2020-11-07 PROCEDURE — 70551 MRI BRAIN STEM W/O DYE: CPT

## 2020-11-07 PROCEDURE — 94640 AIRWAY INHALATION TREATMENT: CPT

## 2020-11-07 PROCEDURE — 74011250637 HC RX REV CODE- 250/637: Performed by: NURSE PRACTITIONER

## 2020-11-07 PROCEDURE — 84443 ASSAY THYROID STIM HORMONE: CPT

## 2020-11-07 RX ORDER — BUTALBITAL, ACETAMINOPHEN AND CAFFEINE 50; 325; 40 MG/1; MG/1; MG/1
2 TABLET ORAL
Status: DISCONTINUED | OUTPATIENT
Start: 2020-11-07 | End: 2020-11-08 | Stop reason: HOSPADM

## 2020-11-07 RX ORDER — LEVOTHYROXINE SODIUM 300 UG/1
TABLET ORAL
COMMUNITY

## 2020-11-07 RX ORDER — LEVOTHYROXINE SODIUM 75 UG/1
300 TABLET ORAL
Status: DISCONTINUED | OUTPATIENT
Start: 2020-11-07 | End: 2020-11-08 | Stop reason: HOSPADM

## 2020-11-07 RX ORDER — BUTALBITAL, ACETAMINOPHEN AND CAFFEINE 50; 325; 40 MG/1; MG/1; MG/1
1 TABLET ORAL
Status: DISCONTINUED | OUTPATIENT
Start: 2020-11-07 | End: 2020-11-08 | Stop reason: HOSPADM

## 2020-11-07 RX ORDER — HYDROCHLOROTHIAZIDE 25 MG/1
25 TABLET ORAL DAILY
COMMUNITY
End: 2020-11-08

## 2020-11-07 RX ORDER — ESCITALOPRAM OXALATE 10 MG/1
10 TABLET ORAL
Status: DISCONTINUED | OUTPATIENT
Start: 2020-11-07 | End: 2020-11-08 | Stop reason: HOSPADM

## 2020-11-07 RX ADMIN — AMLODIPINE BESYLATE 10 MG: 5 TABLET ORAL at 09:00

## 2020-11-07 RX ADMIN — Medication 10 ML: at 06:00

## 2020-11-07 RX ADMIN — LEVOTHYROXINE SODIUM 300 MCG: 0.07 TABLET ORAL at 08:54

## 2020-11-07 RX ADMIN — ATORVASTATIN CALCIUM 80 MG: 40 TABLET, FILM COATED ORAL at 21:23

## 2020-11-07 RX ADMIN — ACETAMINOPHEN 650 MG: 325 TABLET ORAL at 18:50

## 2020-11-07 RX ADMIN — ACETAMINOPHEN 650 MG: 325 TABLET ORAL at 08:57

## 2020-11-07 RX ADMIN — Medication 10 ML: at 21:23

## 2020-11-07 RX ADMIN — ASPIRIN 81 MG CHEWABLE TABLET 81 MG: 81 TABLET CHEWABLE at 08:54

## 2020-11-07 RX ADMIN — BUTALBITAL, ACETAMINOPHEN, AND CAFFEINE 1 TABLET: 50; 325; 40 TABLET ORAL at 11:20

## 2020-11-07 RX ADMIN — BUTALBITAL, ACETAMINOPHEN, AND CAFFEINE 1 TABLET: 50; 325; 40 TABLET ORAL at 18:02

## 2020-11-07 RX ADMIN — Medication 10 ML: at 13:39

## 2020-11-07 RX ADMIN — ALBUTEROL SULFATE 2.5 MG: 2.5 SOLUTION RESPIRATORY (INHALATION) at 04:29

## 2020-11-07 NOTE — PROGRESS NOTES
DEBBIE PLAN:  DC home when medically stable w/ outpt f/u. Reason for Admission:   Pt is a 51 yo female admitted from home for suspected CVA - was given TPA. Pt was sent to ED with sudden onset of dysarthria and left-sided weakness. Pt lives w/ her jose and was indep for ADLs and mobility pta to include driving and employment. Pt reported stress from job and other factors. RUR Score:    6% Low RUR                 Plan for utilizing home health:    Pt does not have significant deficits and is not homebound. PCP: First and Last name:  Dr. Amara Castro   Name of Practice:    Are you a current patient: Yes/No:  Yes   Approximate date of last visit:     Can you participate in a virtual visit with your PCP:   Yes                    Current Advanced Directive/Advance Care Plan: Not on file - Full Code. Edvin Orozco is surrogate medical decision maker. Transition of Care Plan:                    Symptoms mostly resolved. MRI results pending  PT/OT/SLP have evaluated - very slight deficits - pt may not need need svcs at dc. Pt recently attended OP PT at [address]. WIll need to see PCP for hospital f/u and Neuro f/u per MD.  Pt's jose or other family member will  at dc. No further dc needs identified.     Mary Little, MSW

## 2020-11-07 NOTE — PROGRESS NOTES
Neurology Progress Note    Patient ID:  Gwendolyeagle Saldaña  [id number]  50 y.o.  1975      CHIEF COMPLAINT: Headache and left-sided weakness    Subjective:      Patient has complaints persistent headaches, and increased stress and tension, patient somewhat tearful, and complaining of some left-sided numbness and weakness that seems to be more functional in type. Patient had a CTA that was negative of the head neck, and a CT was normal, and her MRI today was also normal, so she has no acute stroke. Patient still has a functional weakness on the left side it looks more functional.  Patient will be started on Lexapro 10 mg a day and given Fioricet as needed for headaches. Discussed with nursing staff. Echocardiogram pending.     Current Facility-Administered Medications   Medication Dose Route Frequency    levothyroxine (SYNTHROID) tablet 300 mcg  300 mcg Oral ACB    butalbital-acetaminophen-caffeine (FIORICET, ESGIC) -40 mg per tablet 1 Tab  1 Tab Oral Q6H PRN    escitalopram oxalate (LEXAPRO) tablet 10 mg  10 mg Oral DAILY WITH DINNER    butalbital-acetaminophen-caffeine (FIORICET, ESGIC) -40 mg per tablet 2 Tab  2 Tab Oral Q8H PRN    acetaminophen (TYLENOL) tablet 650 mg  650 mg Oral Q4H PRN    Or    acetaminophen (TYLENOL) solution 650 mg  650 mg Per NG tube Q4H PRN    Or    acetaminophen (TYLENOL) suppository 650 mg  650 mg Rectal Q4H PRN    albuterol (PROVENTIL VENTOLIN) nebulizer solution 2.5 mg  2.5 mg Nebulization Q4H PRN    aspirin chewable tablet 81 mg  81 mg Oral DAILY    amLODIPine (NORVASC) tablet 10 mg  10 mg Oral DAILY    sodium chloride (NS) flush 5-40 mL  5-40 mL IntraVENous Q8H    sodium chloride (NS) flush 5-40 mL  5-40 mL IntraVENous PRN    ondansetron (ZOFRAN) injection 4 mg  4 mg IntraVENous Q6H PRN    atorvastatin (LIPITOR) tablet 80 mg  80 mg Oral QHS    labetaloL (NORMODYNE;TRANDATE) injection 5 mg  5 mg IntraVENous Q10MIN PRN        Past Medical History: Diagnosis Date    HTN (hypertension)        Past Surgical History:   Procedure Laterality Date    HX OTHER SURGICAL      Right shoulder       [unfilled]    Social History     Tobacco Use    Smoking status: Never Smoker   Substance Use Topics    Alcohol use: Not on file       Current Facility-Administered Medications   Medication Dose Route Frequency Provider Last Rate Last Dose    levothyroxine (SYNTHROID) tablet 300 mcg  300 mcg Oral ACB Guanakopetaror Melo, ACNP   300 mcg at 11/07/20 0854    butalbital-acetaminophen-caffeine (FIORICET, ESGIC) -40 mg per tablet 1 Tab  1 Tab Oral Q6H PRN Geoff Jensen MD   1 Tab at 11/07/20 1802    escitalopram oxalate (LEXAPRO) tablet 10 mg  10 mg Oral DAILY WITH DINNER Ramakrishna Aguilar MD        butalbital-acetaminophen-caffeine Ittoqqortoormiit, ESGIC) -40 mg per tablet 2 Tab  2 Tab Oral Q8H PRN Ramakrishna Aguilar MD        acetaminophen (TYLENOL) tablet 650 mg  650 mg Oral Q4H PRN Jessica Deleon MD   650 mg at 11/07/20 0857    Or    acetaminophen (TYLENOL) solution 650 mg  650 mg Per NG tube Q4H PRN Jessica Deleon MD        Or    acetaminophen (TYLENOL) suppository 650 mg  650 mg Rectal Q4H PRN Jessica Deleon MD        albuterol (PROVENTIL VENTOLIN) nebulizer solution 2.5 mg  2.5 mg Nebulization Q4H PRN Jessica Deleon MD   2.5 mg at 11/07/20 0429    aspirin chewable tablet 81 mg  81 mg Oral DAILY Geoff Jensen MD   81 mg at 11/07/20 0854    amLODIPine (NORVASC) tablet 10 mg  10 mg Oral DAILY Geoff Jensen MD   10 mg at 11/07/20 0900    sodium chloride (NS) flush 5-40 mL  5-40 mL IntraVENous Q8H Jessica Deleon MD   10 mL at 11/07/20 1339    sodium chloride (NS) flush 5-40 mL  5-40 mL IntraVENous PRN Jessica Deleon MD        ondansetron [facility]) injection 4 mg  4 mg IntraVENous Q6H PRN Jessica Deleon MD   4 mg at 11/06/20 1112    atorvastatin (LIPITOR) tablet 80 mg  80 mg Oral QHS Jessica Deleon MD   80 mg at 11/06/20 2300    labetaloL (NORMODYNE;TRANDATE) injection 5 mg  5 mg IntraVENous Q10MIN PRN Ana Maria Joseph MD           Allergies   Allergen Reactions    Influenza Virus Vaccines Anaphylaxis    Shellfish Derived Anaphylaxis    Betadine [Povidone-Iodine] Hives       Review of Systems:    A comprehensive review of systems was negative except for: Constitutional: positive for fatigue and malaise  Musculoskeletal: positive for myalgias, arthralgias, stiff joints and muscle weakness  Neurological: positive for headaches, paresthesia, coordination problems, weakness and Insomnia  Behvioral/Psych: positive for Insomnia, anxiety and depression    Objective:    Objective:     Patient Vitals for the past 24 hrs:   BP Temp Pulse Resp SpO2   11/07/20 1500 112/75 98.6 °F (37 °C) 72 18 96 %   11/07/20 1133 116/76 98.2 °F (36.8 °C) 66 18 94 %   11/07/20 0900 134/84 98.3 °F (36.8 °C) (!) 58 18 95 %   11/07/20 0428     96 %   11/07/20 0410 105/64 98.1 °F (36.7 °C) (!) 59 18 95 %   11/07/20 0315 115/77 98.2 °F (36.8 °C) (!) 55 18 98 %   11/06/20 2258 120/62 98.5 °F (36.9 °C) (!) 58 18 96 %   11/06/20 2005 133/64 97.8 °F (36.6 °C) 64 18 96 %         Lab Review   Recent Results (from the past 24 hour(s))   TSH 3RD GENERATION    Collection Time: 11/07/20 11:18 AM   Result Value Ref Range    TSH 78.10 (H) 0.36 - 3.74 uIU/mL           Additional comments:I personally viewed and interpreted the patient's MRI scan and CTA  MRI Results (most recent):  Results from Hospital Encounter encounter on 11/05/20   MRI BRAIN WO CONT    Narrative EXAM: MRI BRAIN WO CONT    TECHNIQUE: Brain images including sagittal and axial T1-weighted, axial FLAIR,  T2-weighted, diffusion weighted, gradient echo,  coronal T2    IV CONTRAST:  None    INDICATION:  CVA s/p TPA    COMPARISON:  CT of 11/5/2020    FINDINGS:  BRAIN PARENCHYMA:  Normal. No acute infarct. No chronic infarct. No significant  white matter disease. No masses. INTRACRANIAL HEMORRHAGE: None.     CSF SPACES:  Normal in size and morphology for the patient's age. BASAL CISTERNS:  Patent. MIDLINE SHIFT: None. VASCULAR SYSTEM:  Normal flow voids. PARANASAL SINUSES AND MASTOID AIR CELLS:  No significant opacification. VISUALIZED ORBITS:  No significant abnormalities. VISUALIZED UPPER CERVICAL SPINE:  No significant abnormalities. SELLA:  No enlargement or  focal abnormality. SKULL BASE:  No significant abnormalities. Cerebellar tonsils in normal  position. CALVARIUM:  Intact. Impression IMPRESSION:  No evidence of acute infarct. No significant abnormalities. Results from [place] encounter on 11/05/20   MRI BRAIN WO CONT    Narrative EXAM: MRI BRAIN WO CONT    TECHNIQUE: Brain images including sagittal and axial T1-weighted, axial FLAIR,  T2-weighted, diffusion weighted, gradient echo,  coronal T2    IV CONTRAST:  None    INDICATION:  CVA s/p TPA    COMPARISON:  CT of 11/5/2020    FINDINGS:  BRAIN PARENCHYMA:  Normal. No acute infarct. No chronic infarct. No significant  white matter disease. No masses. INTRACRANIAL HEMORRHAGE: None. CSF SPACES:  Normal in size and morphology for the patient's age. BASAL CISTERNS:  Patent. MIDLINE SHIFT: None. VASCULAR SYSTEM:  Normal flow voids. PARANASAL SINUSES AND MASTOID AIR CELLS:  No significant opacification. VISUALIZED ORBITS:  No significant abnormalities. VISUALIZED UPPER CERVICAL SPINE:  No significant abnormalities. SELLA:  No enlargement or  focal abnormality. SKULL BASE:  No significant abnormalities. Cerebellar tonsils in normal  position. CALVARIUM:  Intact. Impression IMPRESSION:  No evidence of acute infarct. No significant abnormalities. NEUROLOGICAL EXAM:    Appearance: The patient is well developed, well nourished, provides a coherent history and is in mild distress emotionally.    Mental Status: Oriented to time, place and person and the president, cognitive function and fund of knowledge is normal. Speech is fluent without aphasia or dysarthria. Mood and affect appropriate. Cranial Nerves:   Intact visual fields. Fundi are benign, discs are flat, no lesions seen on funduscopy. MARISABEL, EOM's full, no nystagmus, no ptosis. Facial sensation is normal. Corneal reflexes are not tested. Facial movement is symmetric. Hearing is normal bilaterally. Palate is midline with normal sternocleidomastoid and trapezius muscles are normal. Tongue is midline. Neck without meningismus or bruits   Motor:  5/5 strength in upper and lower proximal and distal muscles on the right, and she has a functional weakness on the left side with giving way but no arm drift. Normal bulk and tone. No fasciculations. Rapid alternating movement is symmetric and intact in all extremities   Reflexes:   Deep tendon reflexes 2+/4 and symmetrical.  No babinski or clonus present   Sensory:   Normal to touch, pinprick and temperature and vibration. DSS is intact   Gait:  Abnormal gait with patient walking with a mild limp. Tremor:   No tremor noted. Cerebellar:   Mildly abnormal cerebellar signs present on Romberg, tandem, and finger-nose-finger exam.   Neurovascular:  Normal heart sounds and regular rhythm, peripheral pulses intact, and no carotid bruits. Assessment:         ICD-10-CM ICD-9-CM    1. Cerebrovascular accident (CVA) due to occlusion of small artery ([address]  I63.81 434.91    2. Dysarthria  R47.1 784.51    3.  Acute nonintractable headache, unspecified headache type  R51.9 784.0      Active Problems:    CVA (cerebral vascular accident) ([address] (11/5/2020)      S/P admn tPA in diff fac w/n last 24 hr bef adm to crnt fac (11/5/2020)      Bilateral carotid artery stenosis (11/7/2020)      Transient ischemic attack involving right internal carotid artery (11/7/2020)      Tension vascular headache (11/7/2020)        Plan:     Patient with normal work-up and no TIA or stroke, and her real problem seems to be stress and tension and tension vascular headaches, so we will put her on Lexapro give her Fioricet as needed for headache, she continues her aspirin and statin for now, and hopefully with time she will get better. Difficult case, she may need psychiatry to see her in addition. Rest of her work-up has been ordered and is pending.       Signed:  Sobeida Harris MD  11/7/2020  6:24 PM    Luis Angel Morrow MD  [phone]

## 2020-11-07 NOTE — PROGRESS NOTES
No surgery found *  * No surgery found *  Bedside and Verbal shift change report given to [address] (oncoming nurse) by Madeline Decker RN (offgoing nurse). Report included the following information SBAR, Kardex, MAR and Recent Results.     Zone Phone:   [phone]        Significant changes during shift:  none           Patient Information     Nurys Rivas  50 y.o.  11/5/2020 11:48 AM by Leena Patel MD. Nurys Rivas was admitted from Home     Problem List          Patient Active Problem List     Diagnosis Date Noted    CVA (cerebral vascular accident) ([address] 11/05/2020    S/P admn tPA in diff fac w/n last 24 hr bef adm to crnt fac 11/05/2020           Past Medical History:   Diagnosis Date    HTN (hypertension)              Core Measures:     CVA: Yes Yes  CHF:No Not applicable  PNA:No Not applicable     Activity Status:     OOB to Chair No  Ambulated this shift Yes   Bed Rest No     Supplemental O2: (If Applicable)     NC No  NRB No  Venti-mask No  On room air Liters/min        LINES AND DRAINS:     PIV     DVT prophylaxis:     DVT prophylaxis Med- No  DVT prophylaxis SCD or MUKUND- No      Wounds: (If Applicable)     Wounds- No     Location none     Patient Safety:     Falls Score Total Score: 2  Safety Level_______  Bed Alarm On? Yes  Sitter?  No     Plan for upcoming shift: safety, PT, OT,           Discharge Plan: No No CM note     Active Consults:  IP CONSULT TO NEUROLOGY

## 2020-11-07 NOTE — PROGRESS NOTES
Problem: Falls - Risk of  Goal: *Absence of Falls  Description: Document Vilma Bustamante Fall Risk and appropriate interventions in the flowsheet.   Note: Fall Risk Interventions:  Mobility Interventions: Bed/chair exit alarm, Patient to call before getting OOB         Medication Interventions: Bed/chair exit alarm, Patient to call before getting OOB    Elimination Interventions: Bed/chair exit alarm, Call light in reach

## 2020-11-07 NOTE — PROGRESS NOTES
* No surgery found *  * No surgery found *  Bedside and Verbal shift change report given to [address] (oncoming nurse) by Godfrey Simon RN (offgoing nurse). Report included the following information SBAR, Kardex, MAR and Recent Results. Zone Phone:   [phone]      Significant changes during shift:  Partial PTA med review-see note        Patient Information    Parish Palomares  50 y.o.  11/5/2020 11:48 AM by Jenna Fletcher MD. Parish Palomares was admitted from Home    Problem List    Patient Active Problem List    Diagnosis Date Noted    CVA (cerebral vascular accident) ([facility] 11/05/2020    S/P admn tPA in diff fac w/n last 24 hr bef adm to crnt fac 11/05/2020     Past Medical History:   Diagnosis Date    HTN (hypertension)          Core Measures:    CVA: Yes Yes  CHF:No Not applicable  PNA:No Not applicable    Activity Status:    OOB to Chair No  Ambulated this shift Yes   Bed Rest No    Supplemental O2: (If Applicable)    NC No  NRB No  Venti-mask No  On room air Liters/min      LINES AND DRAINS:    PIV    DVT prophylaxis:    DVT prophylaxis Med- No  DVT prophylaxis SCD or MUKUND- No     Wounds: (If Applicable)    Wounds- No    Location none    Patient Safety:    Falls Score Total Score: 2  Safety Level_______  Bed Alarm On? Yes  Sitter?  No    Plan for upcoming shift: safety, MRI, PT, OT,        Discharge Plan: No No CM note    Active Consults:  IP CONSULT TO NEUROLOGY

## 2020-11-07 NOTE — PROGRESS NOTES
Hospitalist Progress Note    NAME: Heather Chase   :  1975   MRN:  [id number]       Assessment / Plan:  Acute ischemic CVA POA  -Patient presented with dysarthria and left-sided weakness and received TPA in the ED , symptoms are patient is 24 hours out of TPA administration  -Improving symptoms   -We will continue aspirin 81 mg and  amlodipine 10 mg for blood pressure control  -Repeat CT of the head is normal   -MRI of the head pending  -continue statin 80 mg   -LDL is 139  -A1c is 5.3  -PT/OT , SLP    Echo:  · LV: Estimated LVEF is 55 - 60%. Normal cavity size, wall thickness and systolic function (ejection fraction normal). · Saline contrast was given to evaluate for intracardiac shunt. Hypothyroidism: Continue levothyroxine, patient takes 300 mcg at home check TSH levels    Headache: Likely post TPA. Was given magnesium sulfate yesterday with improvement. Will give Fioricet. MRI pending    Morbid Obesity: Due to excess caloric intake    40 or above Morbid obesity / Body mass index is 43.58 kg/m². Code status: Full  Prophylaxis: SCD's  Recommended Disposition: Home w/Family     Subjective:     Chief Complaint / Reason for Physician Visit  \"Symptoms of dysarthria and left-sided weakness are improving, reports right-sided headache, denies any diplopia, dysphagia or gait abnormality. \". Discussed with RN events overnight. Review of Systems:  Symptom Y/N Comments  Symptom Y/N Comments   Fever/Chills n   Chest Pain n    Poor Appetite n   Edema n    Cough n   Abdominal Pain n    Sputum n   Joint Pain n    SOB/HAHN n   Pruritis/Rash     Nausea/vomit n   Tolerating PT/OT     Diarrhea n   Tolerating Diet     Constipation n   Other       Could NOT obtain due to:      Objective:     VITALS:   Last 24hrs VS reviewed since prior progress note.  Most recent are:  Patient Vitals for the past 24 hrs:   Temp Pulse Resp BP SpO2   20 0900 98.3 °F (36.8 °C) (!) 58 18 134/84 95 %   20 0428     96 %   11/07/20 0410 98.1 °F (36.7 °C) (!) 59 18 105/64 95 %   11/07/20 0315 98.2 °F (36.8 °C) (!) 55 18 115/77 98 %   11/06/20 2258 98.5 °F (36.9 °C) (!) 58 18 120/62 96 %   11/06/20 2005 97.8 °F (36.6 °C) 64 18 133/64 96 %   11/06/20 1417  64  (!) 177/101    11/06/20 1412  60  (!) 168/96    11/06/20 1400  67 16  99 %   11/06/20 1330  60 14 (!) 150/85 99 %   11/06/20 1315  (!) 45 13 130/77 100 %   11/06/20 1300  (!) 45 16 137/69 99 %   11/06/20 1245  63 20 126/77 99 %   11/06/20 1230 98.1 °F (36.7 °C) (!) 49 13 133/70 97 %   11/06/20 1215  63 14 127/68    11/06/20 1200  69 17 (!) 150/79 99 %   11/06/20 1143  81 17 (!) 140/87    11/06/20 1116  (!) 59 11 137/77 98 %   11/06/20 1100  66 15 133/75    11/06/20 1030  (!) 56 14 (!) 145/72 98 %   11/06/20 1000  60 16 (!) 141/78 97 %       Intake/Output Summary (Last 24 hours) at 11/7/2020 0954  Last data filed at 11/6/2020 1230  Gross per 24 hour   Intake 240 ml   Output    Net 240 ml        PHYSICAL EXAM:  General: WD, WN. Alert, cooperative, no acute distress    EENT:  EOMI. Anicteric sclerae. MMM  Resp:  CTA bilaterally, no wheezing or rales. No accessory muscle use  CV:  Regular  rhythm,  No edema  GI:  Soft, Non distended, Non tender.  +Bowel sounds  Neurologic:  Alert and oriented X 3, normal speech,   Psych:   Good insight. Not anxious nor agitated  Skin:  No rashes. No jaundice    Reviewed most current lab test results and cultures  YES  Reviewed most current radiology test results   YES  Review and summation of old records today    NO  Reviewed patient's current orders and MAR    YES  PMH/SH reviewed - no change compared to H&P  ________________________________________________________________________  Care Plan discussed with:    Comments   Patient x    Family      RN x    Care Manager     Consultant                        Multidiciplinary team rounds were held today with , nursing, pharmacist and clinical coordinator. Patient's plan of care was discussed; medications were reviewed and discharge planning was addressed. ________________________________________________________________________  Total NON critical care TIME:  30  Minutes    Total CRITICAL CARE TIME Spent:   Minutes non procedure based      Comments   >50% of visit spent in counseling and coordination of care     ________________________________________________________________________  Charmayne Hsu, MD     Procedures: see electronic medical records for all procedures/Xrays and details which were not copied into this note but were reviewed prior to creation of Plan. LABS:  I reviewed today's most current labs and imaging studies.   Pertinent labs include:  Recent Labs     11/06/20  0448 11/05/20  1148   WBC 6.1 7.0   HGB 11.1* 12.6   HCT 34.3* 39.1    408*     Recent Labs     11/06/20  0448 11/05/20  1148    140   K 3.6 3.5    106   CO2 26 29   GLU 87 82   BUN 7 9   CREA 0.63 0.74   CA 9.0 9.3   ALB  --  3.7   TBILI  --  0.3   ALT  --  17   INR  --  1.0       Signed: Charmayne Hsu, MD

## 2020-11-07 NOTE — PROGRESS NOTES
Problem: Falls - Risk of  Goal: *Absence of Falls  Description: Document Veatrice Marker Fall Risk and appropriate interventions in the flowsheet.   Outcome: Progressing Towards Goal  Note: Fall Risk Interventions:  Mobility Interventions: Bed/chair exit alarm, Assess mobility with egress test, Patient to call before getting OOB              Elimination Interventions: Bed/chair exit alarm, Call light in reach, Toileting schedule/hourly rounds, Patient to call for help with toileting needs

## 2020-11-07 NOTE — ROUTINE PROCESS
Tried PTA med review with patient. Results are in PTA med review tab. Patient also states she is taking \"a blood pressure med maybe starts with Card, 25 mg once a day. \" She could not provide other details.

## 2020-11-08 VITALS
SYSTOLIC BLOOD PRESSURE: 125 MMHG | BODY MASS INDEX: 43.39 KG/M2 | WEIGHT: 270 LBS | OXYGEN SATURATION: 96 % | RESPIRATION RATE: 16 BRPM | HEIGHT: 66 IN | HEART RATE: 66 BPM | DIASTOLIC BLOOD PRESSURE: 78 MMHG | TEMPERATURE: 99.5 F

## 2020-11-08 LAB — ERYTHROCYTE [SEDIMENTATION RATE] IN BLOOD: 24 MM/HR (ref 0–20)

## 2020-11-08 PROCEDURE — 74011000250 HC RX REV CODE- 250: Performed by: INTERNAL MEDICINE

## 2020-11-08 PROCEDURE — 36415 COLL VENOUS BLD VENIPUNCTURE: CPT

## 2020-11-08 PROCEDURE — 94640 AIRWAY INHALATION TREATMENT: CPT

## 2020-11-08 PROCEDURE — 74011250637 HC RX REV CODE- 250/637: Performed by: INTERNAL MEDICINE

## 2020-11-08 PROCEDURE — 86038 ANTINUCLEAR ANTIBODIES: CPT

## 2020-11-08 PROCEDURE — 85652 RBC SED RATE AUTOMATED: CPT

## 2020-11-08 PROCEDURE — 74011250637 HC RX REV CODE- 250/637: Performed by: NURSE PRACTITIONER

## 2020-11-08 PROCEDURE — 99232 SBSQ HOSP IP/OBS MODERATE 35: CPT | Performed by: PSYCHIATRY & NEUROLOGY

## 2020-11-08 RX ORDER — ESCITALOPRAM OXALATE 10 MG/1
10 TABLET ORAL
Qty: 30 TAB | Refills: 2 | Status: SHIPPED | OUTPATIENT
Start: 2020-11-08

## 2020-11-08 RX ORDER — ATORVASTATIN CALCIUM 80 MG/1
80 TABLET, FILM COATED ORAL
Qty: 30 TAB | Refills: 2 | Status: SHIPPED | OUTPATIENT
Start: 2020-11-08

## 2020-11-08 RX ORDER — GUAIFENESIN 100 MG/5ML
81 LIQUID (ML) ORAL DAILY
Qty: 30 TAB | Refills: 2 | Status: SHIPPED | OUTPATIENT
Start: 2020-11-09

## 2020-11-08 RX ORDER — AMLODIPINE BESYLATE 10 MG/1
10 TABLET ORAL DAILY
Qty: 30 TAB | Refills: 2 | Status: SHIPPED | OUTPATIENT
Start: 2020-11-09

## 2020-11-08 RX ORDER — BUTALBITAL, ACETAMINOPHEN AND CAFFEINE 50; 325; 40 MG/1; MG/1; MG/1
1 TABLET ORAL
Qty: 20 TAB | Refills: 0 | Status: SHIPPED | OUTPATIENT
Start: 2020-11-08

## 2020-11-08 RX ADMIN — Medication 10 ML: at 06:22

## 2020-11-08 RX ADMIN — AMLODIPINE BESYLATE 10 MG: 5 TABLET ORAL at 08:01

## 2020-11-08 RX ADMIN — ACETAMINOPHEN 650 MG: 325 TABLET ORAL at 07:59

## 2020-11-08 RX ADMIN — BUTALBITAL, ACETAMINOPHEN, AND CAFFEINE 1 TABLET: 50; 325; 40 TABLET ORAL at 07:59

## 2020-11-08 RX ADMIN — ALBUTEROL SULFATE 2.5 MG: 2.5 SOLUTION RESPIRATORY (INHALATION) at 09:20

## 2020-11-08 RX ADMIN — ASPIRIN 81 MG CHEWABLE TABLET 81 MG: 81 TABLET CHEWABLE at 08:01

## 2020-11-08 RX ADMIN — LEVOTHYROXINE SODIUM 300 MCG: 0.07 TABLET ORAL at 08:01

## 2020-11-08 NOTE — PROGRESS NOTES
D/C Plan:      -d/c home  -PCP follow up        Patient scheduled to discharge home today. Aware of follow up appointment w/PCP for this inpatient admission. Patient is clear to discharge from SW standpoint.         YESSY Botello

## 2020-11-08 NOTE — PROGRESS NOTES
Bedside and Verbal shift change report given to [address] (oncoming nurse) by Carlos Lyman (offgoing nurse). Report included the following information SBAR, Kardex, Intake/Output, MAR, Accordion, Recent Results, Med Rec Status and Cardiac Rhythm nsr.

## 2020-11-08 NOTE — PROGRESS NOTES
Bedside and Verbal shift change report given to Olaf Caceres (oncoming nurse) by GRISELDA Rasmussen RN (offgoing nurse). Report given with SBAR, Kardex, Intake/Output, MAR and Recent Results.

## 2020-11-08 NOTE — PROGRESS NOTES
ADULT PROTOCOL: JET AEROSOL  REASSESSMENT    Patient  Cordelia Gates     50 y.o.   female     11/8/2020  9:24 AM    Breath Sounds Pre Procedure: Right Breath Sounds: Diminished                               Left Breath Sounds: Diminished    Breath Sounds Post Procedure: Right Breath Sounds: Diminished                                 Left Breath Sounds: Diminished    Breathing pattern: Pre procedure Breathing Pattern: Regular          Post procedure Breathing Pattern: Regular    Heart Rate: Pre procedure Pulse: 65           Post procedure Pulse: 67    Resp Rate: Pre procedure Respirations: 20           Post procedure Respirations: 20     Oxygen: O2 Device: Room air        SpO2: Pre procedure SpO2: 96 %                 Post procedure SpO2: 96 %       Nebulizer Therapy: Current medications Aerosolized Medications: Albuterol       Problem List:   Patient Active Problem List   Diagnosis Code    [facility] I63.9    S/P admn tPA in diff fac w/n last 24 hr bef adm to crnt fac Z92.82    Bilateral carotid artery stenosis I65.23    Transient ischemic attack involving right internal carotid artery G45.1    Tension vascular headache G44.209       Respiratory Therapist: Estefani Barry

## 2020-11-08 NOTE — DISCHARGE SUMMARY
Hospitalist Discharge Summary     Patient ID:  Caleb Hernandez  [id number]  50 y.o.  1975  11/5/2020    PCP on record: Delaney Benavides MD    Admit date: 11/5/2020  Discharge date and time: 11/8/2020    DISCHARGE DIAGNOSIS:    Slurred speech and left sided weakness POA  Tension Type  Vascular headache  Hypothyroidism: Morbid Obesity:   Hypertension    CONSULTATIONS:  IP CONSULT TO NEUROLOGY    Excerpted HPI from H&P of Belinda Mandujano MD:    Caleb Hernandez is a 50 y.o.  female past medical history hypertension who was sent to the ED with sudden onset of dysarthria associated with some left-sided weakness that started when she was at her job around 8:45 AM.  Patient received TPA in the ED and her symptoms improved except for a residual mild left lower extremity weakness. And was seen after TPA, reported that day prior to her symptoms she was having headaches and jaw pain. She reported some nausea on her way to the ED. 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 In the ED, vital signs upon presentation showed that she was hypertensive, her labs showed normal CBC and BMP  CT of the brain /CTA of the brain and neck were within normal limit  We were asked to admit for work up and evaluation of the above problems. ______________________________________________________________________  DISCHARGE SUMMARY/HOSPITAL COURSE:  for full details see H&P, daily progress notes, labs, consult notes.            Slurred speech and left-sided weakness POA/tension type vascular headache  -Patient was initially evaluated in the ED and was given TPA for the possible CVA  -She was monitored in the ICU post TPA ministration  -She complained of severe headache  -CT head, CTA head and neck and MRI of the brain unremarkable  -Since the MRI and CTA with CT of the head are unremarkable and patient complained of severe right-sided headache her symptoms were believed due to tension type vascular headache  -Was advised to start Fioricet as needed and Lexapro by neurology  -continue statin 80 mg for primary prophylaxis  -Aspirin was added for primary prophylaxis  -LDL is 139  -A1c is 5.3  -PT/OT   -Patient is completely asymptomatic at this point     Echo:  · LV: Estimated LVEF is 55 - 60%. Normal cavity size, wall thickness and systolic function (ejection fraction normal). · Saline contrast was given to evaluate for intracardiac shunt.             Hypothyroidism: Continue levothyroxine, patient takes 300 mcg at home, TSH level is 78. I suspect noncompliance with the medication as patient reports that she gets it from mail order and did not have her prescription for some time at the end of September  Continue the current dose of levothyroxine which is 300 mcg daily. Compliance was discussed and repeat TSH in 4 to 6 weeks to be done     Headache:  Patient likely has tension type vascular headache and Lexapro was added. Fioricet as needed was added     Morbid Obesity: Due to excess caloric intake  _______________________________________________________________________  Patient seen and examined by me on discharge day. Pertinent Findings:  Gen:    Not in distress  Chest: Clear lungs  CVS:   Regular rhythm. No edema  Abd:  Soft, not distended, not tender  Neuro:  Alert, oriented x4  _______________________________________________________________________  DISCHARGE MEDICATIONS:   Current Discharge Medication List      START taking these medications    Details   amLODIPine (NORVASC) 10 mg tablet Take 1 Tab by mouth daily. Qty: 30 Tab, Refills: 2      aspirin 81 mg chewable tablet Take 1 Tab by mouth daily. Qty: 30 Tab, Refills: 2      atorvastatin (LIPITOR) 80 mg tablet Take 1 Tab by mouth nightly. Qty: 30 Tab, Refills: 2      butalbital-acetaminophen-caffeine (FIORICET, ESGIC) -40 mg per tablet Take 1 Tab by mouth every six (6) hours as needed for Headache or Migraine.   Qty: 20 Tab, Refills: 0 escitalopram oxalate (LEXAPRO) 10 mg tablet Take 1 Tab by mouth daily (with dinner). Qty: 30 Tab, Refills: 2         CONTINUE these medications which have NOT CHANGED    Details   levothyroxine (SYNTHROID) 300 mcg tablet Take  by mouth Daily (before breakfast). naproxen (NAPROSYN) 500 mg tablet Take 1 Tab by mouth every twelve (12) hours as needed for Pain. Qty: 20 Tab, Refills: 0      cyclobenzaprine (FLEXERIL) 10 mg tablet Take 1 Tab by mouth three (3) times daily as needed for Muscle Spasm(s). Qty: 20 Tab, Refills: 0         STOP taking these medications       hydroCHLOROthiazide (HYDRODIURIL) 25 mg tablet Comments:   Reason for Stopping:                 Patient Follow Up Instructions:    Activity: Activity as tolerated  Diet: Cardiac Diet  Wound Care: None needed    Follow-up with PCP and Neurology  in 1-2 weeks  Follow-up tests/labs none  Follow-up Information     Follow up With Specialties Details Why Contact Raffy Ramirez MD Family Medicine In 1 week Please make an appointment to see your PCP in 1 week for [facility] f/u. [address] [place] [address]           ________________________________________________________________    Risk of deterioration: High    Condition at Discharge:  Stable  __________________________________________________________________    Disposition  Home with family, no needs    ____________________________________________________________________    Code Status: Full Code  ___________________________________________________________________      Total time in minutes spent coordinating this discharge (includes going over instructions, follow-up, prescriptions, and preparing report for sign off to her PCP) :  35 minutes    Signed:  Selma Nava MD

## 2020-11-08 NOTE — PROGRESS NOTES
To whom it may concern      This patient was admitted to the hospital from November 5 2020 to November 8, 2020. She can return to work on November 10, 2020 without any restrictions.

## 2020-11-08 NOTE — DISCHARGE INSTRUCTIONS
[employer] Activation    Thank you for requesting access to [employer]. Please follow the instructions below to securely access and download your online medical record. [employer] allows you to send messages to your doctor, view your test results, renew your prescriptions, schedule appointments, and more. How Do I Sign Up? 1. In your internet browser, go to [email or web address]  2. Click on the First Time User? Click Here link in the Sign In box. You will be redirect to the New Member Sign Up page. 3. Enter your [employer] Access Code exactly as it appears below. You will not need to use this code after youve completed the sign-up process. If you do not sign up before the expiration date, you must request a new code. [employer] Access Code: [email or web address]  Expires: 2020  6:48 AM (This is the date your [employer] access code will )    4. Enter the last four digits of your Social Security Number (xxxx) and Date of Birth (mm/dd/yyyy) as indicated and click Submit. You will be taken to the next sign-up page. 5. Create a [employer] ID. This will be your [employer] login ID and cannot be changed, so think of one that is secure and easy to remember. 6. Create a [employer] password. You can change your password at any time. 7. Enter your Password Reset Question and Answer. This can be used at a later time if you forget your password. 8. Enter your e-mail address. You will receive e-mail notification when new information is available in [address]. 9. Click Sign Up. You can now view and download portions of your medical record. 10. Click the Download Summary menu link to download a portable copy of your medical information. Additional Information    If you have questions, please visit the Frequently Asked Questions section of the [employer] website at [email or web address] Remember, [employer] is NOT to be used for urgent needs. For medical emergencies, dial 911.                             HOSPITALIST DISCHARGE INSTRUCTIONS    NAME: Brianna Fernando   :  1975   MRN:  [id number]     Date/Time:  2020 9:39 AM    ADMIT DATE: 2020     DISCHARGE DATE: 2020     DISCHARGE DIAGNOSIS:  Slurred speech  Tension Headache  Hypothyroidism    MEDICATIONS:  · It is important that you take the medication exactly as they are prescribed. · Keep your medication in the bottles provided by the pharmacist and keep a list of the medication names, dosages, and times to be taken in your wallet. · Do not take other medications without consulting your doctor. Pain Management: per above medications    What to do at Home    Recommended diet:  Cardiac Diet    Recommended activity: Activity as tolerated    If you have questions regarding the hospital related prescriptions or hospital related issues [facility] [phone]. If you experience any of the following symptoms then please call your primary care physician or return to the emergency room if you cannot get hold of your doctor:  Fever, chills, nausea, vomiting, diarrhea, change in mentation, falling, bleeding, shortness of breath. Follow Up:  Dr. Macario Cisneros MD  you are to call and set up an appointment to see them in 7-10 days. Information obtained by :  I understand that if any problems occur once I am at home I am to contact my physician. I understand and acknowledge receipt of the instructions indicated above.                                                                                                                                            Physician's or R.N.'s Signature                                                                  Date/Time                                                                                                                                              Patient or Representative Signature                                                          Date/Time

## 2020-11-09 NOTE — PROGRESS NOTES
Neurology Progress Note    Patient ID:  Marnie Arnold  [id number]  50 y.o.  1975      CHIEF COMPLAINT: Headache and left-sided weakness    Subjective:      Patient has complaints persistent headaches, and increased stress and tension, patient somewhat tearful, and complaining of some left-sided numbness and weakness that seems to be more functional in type. Patient had a CTA that was negative of the head neck, and a CT was normal, and her MRI today was also normal, so she has no acute stroke. Patient markedly better on the Lexapro, she slept better last night, her headaches are gone, her functional left-sided weakness is gone, and she is anxious ready to go home, we talked to the hospitalist and they agree she will be discharged home today with a diagnosis of complicated tension vascular headache    No current facility-administered medications for this encounter. Current Outpatient Medications   Medication Sig    [START ON 11/9/2020] amLODIPine (NORVASC) 10 mg tablet Take 1 Tab by mouth daily.  [START ON 11/9/2020] aspirin 81 mg chewable tablet Take 1 Tab by mouth daily.  atorvastatin (LIPITOR) 80 mg tablet Take 1 Tab by mouth nightly.  butalbital-acetaminophen-caffeine (FIORICET, ESGIC) -40 mg per tablet Take 1 Tab by mouth every six (6) hours as needed for Headache or Migraine.  escitalopram oxalate (LEXAPRO) 10 mg tablet Take 1 Tab by mouth daily (with dinner).  levothyroxine (SYNTHROID) 300 mcg tablet Take  by mouth Daily (before breakfast).  naproxen (NAPROSYN) 500 mg tablet Take 1 Tab by mouth every twelve (12) hours as needed for Pain.  cyclobenzaprine (FLEXERIL) 10 mg tablet Take 1 Tab by mouth three (3) times daily as needed for Muscle Spasm(s).         Past Medical History:   Diagnosis Date    HTN (hypertension)        Past Surgical History:   Procedure Laterality Date    HX OTHER SURGICAL      Right shoulder       [unfilled]    Social History     Tobacco Use    Smoking status: Never Smoker   Substance Use Topics    Alcohol use: Not on file       Current Outpatient Medications   Medication Sig Dispense Refill    [START ON 11/9/2020] amLODIPine (NORVASC) 10 mg tablet Take 1 Tab by mouth daily. 30 Tab 2    [START ON 11/9/2020] aspirin 81 mg chewable tablet Take 1 Tab by mouth daily. 30 Tab 2    atorvastatin (LIPITOR) 80 mg tablet Take 1 Tab by mouth nightly. 30 Tab 2    butalbital-acetaminophen-caffeine (FIORICET, ESGIC) -40 mg per tablet Take 1 Tab by mouth every six (6) hours as needed for Headache or Migraine. 20 Tab 0    escitalopram oxalate (LEXAPRO) 10 mg tablet Take 1 Tab by mouth daily (with dinner). 30 Tab 2    levothyroxine (SYNTHROID) 300 mcg tablet Take  by mouth Daily (before breakfast).  naproxen (NAPROSYN) 500 mg tablet Take 1 Tab by mouth every twelve (12) hours as needed for Pain. 20 Tab 0    cyclobenzaprine (FLEXERIL) 10 mg tablet Take 1 Tab by mouth three (3) times daily as needed for Muscle Spasm(s).  20 Tab 0       Allergies   Allergen Reactions    Influenza Virus Vaccines Anaphylaxis    Shellfish Derived Anaphylaxis    Betadine [Povidone-Iodine] Hives       Review of Systems:    A comprehensive review of systems was negative except for: Constitutional: positive for fatigue and malaise  Musculoskeletal: positive for myalgias, arthralgias, stiff joints and muscle weakness  Neurological: positive for headaches, paresthesia, coordination problems, weakness and Insomnia  Behvioral/Psych: positive for Insomnia, anxiety and depression    Objective:    Objective:     Patient Vitals for the past 24 hrs:   BP Temp Pulse Resp SpO2   11/08/20 0921     96 %   11/08/20 0755 125/78 99.5 °F (37.5 °C) 66 16    11/08/20 0400   (!) 53     11/08/20 0344 116/63 97.8 °F (36.6 °C) 68 18 97 %   11/08/20 0000   (!) 53     11/07/20 2347 117/69 97.8 °F (36.6 °C) 61 17 95 %   11/07/20 2045 115/80 98.8 °F (37.1 °C) 64 16 100 %         Lab Review Recent Results (from the past 24 hour(s))   SED RATE (ESR)    Collection Time: 11/08/20 11:11 AM   Result Value Ref Range    Sed rate, automated 24 (H) 0 - 20 mm/hr           Additional comments:I personally viewed and interpreted the patient's MRI scan and CTA  MRI Results (most recent):  Results from [place] encounter on 11/05/20   MRI BRAIN WO CONT    Narrative EXAM: MRI BRAIN WO CONT    TECHNIQUE: Brain images including sagittal and axial T1-weighted, axial FLAIR,  T2-weighted, diffusion weighted, gradient echo,  coronal T2    IV CONTRAST:  None    INDICATION:  CVA s/p TPA    COMPARISON:  CT of 11/5/2020    FINDINGS:  BRAIN PARENCHYMA:  Normal. No acute infarct. No chronic infarct. No significant  white matter disease. No masses. INTRACRANIAL HEMORRHAGE: None. CSF SPACES:  Normal in size and morphology for the patient's age. BASAL CISTERNS:  Patent. MIDLINE SHIFT: None. VASCULAR SYSTEM:  Normal flow voids. PARANASAL SINUSES AND MASTOID AIR CELLS:  No significant opacification. VISUALIZED ORBITS:  No significant abnormalities. VISUALIZED UPPER CERVICAL SPINE:  No significant abnormalities. SELLA:  No enlargement or  focal abnormality. SKULL BASE:  No significant abnormalities. Cerebellar tonsils in normal  position. CALVARIUM:  Intact. Impression IMPRESSION:  No evidence of acute infarct. No significant abnormalities. Results from [place] encounter on 11/05/20   MRI BRAIN WO CONT    Narrative EXAM: MRI BRAIN WO CONT    TECHNIQUE: Brain images including sagittal and axial T1-weighted, axial FLAIR,  T2-weighted, diffusion weighted, gradient echo,  coronal T2    IV CONTRAST:  None    INDICATION:  CVA s/p TPA    COMPARISON:  CT of 11/5/2020    FINDINGS:  BRAIN PARENCHYMA:  Normal. No acute infarct. No chronic infarct. No significant  white matter disease. No masses. INTRACRANIAL HEMORRHAGE: None.     CSF SPACES:  Normal in size and morphology for the patient's age. BASAL CISTERNS:  Patent. MIDLINE SHIFT: None. VASCULAR SYSTEM:  Normal flow voids. PARANASAL SINUSES AND MASTOID AIR CELLS:  No significant opacification. VISUALIZED ORBITS:  No significant abnormalities. VISUALIZED UPPER CERVICAL SPINE:  No significant abnormalities. SELLA:  No enlargement or  focal abnormality. SKULL BASE:  No significant abnormalities. Cerebellar tonsils in normal  position. CALVARIUM:  Intact. Impression IMPRESSION:  No evidence of acute infarct. No significant abnormalities. NEUROLOGICAL EXAM:    Appearance: The patient is well developed, well nourished, provides a coherent history and is in mild distress emotionally. Mental Status: Oriented to time, place and person and the president, cognitive function and fund of knowledge is normal. Speech is fluent without aphasia or dysarthria. Mood and affect appropriate. Cranial Nerves:   Intact visual fields. Fundi are benign, discs are flat, no lesions seen on funduscopy. MARISABEL, EOM's full, no nystagmus, no ptosis. Facial sensation is normal. Corneal reflexes are not tested. Facial movement is symmetric. Hearing is normal bilaterally. Palate is midline with normal sternocleidomastoid and trapezius muscles are normal. Tongue is midline. Neck without meningismus or bruits   Motor:  5/5 strength in upper and lower proximal and distal muscles on the right, and she has a functional weakness on the left side with giving way but no arm drift. Normal bulk and tone. No fasciculations. Rapid alternating movement is symmetric and intact in all extremities   Reflexes:   Deep tendon reflexes 2+/4 and symmetrical.  No babinski or clonus present   Sensory:   Normal to touch, pinprick and temperature and vibration. DSS is intact   Gait:  Abnormal gait with patient walking with a mild limp. Tremor:   No tremor noted.    Cerebellar:   Mildly abnormal cerebellar signs present on Romberg, tandem, and finger-nose-finger exam.   Neurovascular:  Normal heart sounds and regular rhythm, peripheral pulses intact, and no carotid bruits. Assessment:         ICD-10-CM ICD-9-CM    1. Cerebrovascular accident (CVA) due to occlusion of small artery ([facility] [address]  I63.81 434.91    2. Dysarthria  R47.1 784.51    3. Acute nonintractable headache, unspecified headache type  R51.9 784.0      Active Problems:    CVA (cerebral vascular accident) ([facility] [address] (11/5/2020)      S/P admn tPA in diff fac w/n last 24 hr bef adm to crnt fac (11/5/2020)      Bilateral carotid artery stenosis (11/7/2020)      Transient ischemic attack involving right internal carotid artery (11/7/2020)      Tension vascular headache (11/7/2020)        Plan:     Patient with normal work-up and no TIA or stroke, and her real problem seems to be stress and tension and tension vascular headaches, so we will put her on Lexapro give her Fioricet as needed for headache, she continues her aspirin and statin for now, and hopefully with time she will get better. Difficult case, she may need psychiatry to see her in addition. Rest of her work-up has been ordered and is negative.   Patient markedly better on the Lexapro, she slept better last night, her headaches are gone, her functional left-sided weakness is gone, and she is anxious ready to go home, we talked to the hospitalist and they agree she will be discharged home today with a diagnosis of complicated tension vascular headache      Signed:  Carolyn Vega MD  11/8/2020    9 AM    Alex Morrow MD  [phone]

## 2020-11-10 LAB — ANA SER QL: POSITIVE

## 2021-09-07 ENCOUNTER — HOSPITAL ENCOUNTER (EMERGENCY)
Age: 46
Discharge: HOME | End: 2021-09-07
Attending: EMERGENCY MEDICINE
Payer: COMMERCIAL

## 2021-09-07 ENCOUNTER — APPOINTMENT (OUTPATIENT)
Dept: GENERAL RADIOLOGY | Age: 46
End: 2021-09-07
Attending: EMERGENCY MEDICINE
Payer: COMMERCIAL

## 2021-09-07 VITALS
BODY MASS INDEX: 41.45 KG/M2 | WEIGHT: 264.11 LBS | OXYGEN SATURATION: 93 % | SYSTOLIC BLOOD PRESSURE: 134 MMHG | HEIGHT: 67 IN | TEMPERATURE: 98.6 F | RESPIRATION RATE: 21 BRPM | DIASTOLIC BLOOD PRESSURE: 88 MMHG | HEART RATE: 55 BPM

## 2021-09-07 DIAGNOSIS — E87.6 HYPOKALEMIA: ICD-10-CM

## 2021-09-07 DIAGNOSIS — J45.901 MODERATE ASTHMA WITH ACUTE EXACERBATION, UNSPECIFIED WHETHER PERSISTENT: Primary | ICD-10-CM

## 2021-09-07 LAB
ALBUMIN SERPL-MCNC: 3.1 G/DL (ref 3.5–5)
ALBUMIN/GLOB SERPL: 0.8 {RATIO} (ref 1.1–2.2)
ALP SERPL-CCNC: 70 U/L (ref 45–117)
ALT SERPL-CCNC: 38 U/L (ref 12–78)
ANION GAP SERPL CALC-SCNC: 7 MMOL/L (ref 5–15)
AST SERPL-CCNC: 22 U/L (ref 15–37)
ATRIAL RATE: 54 BPM
BASOPHILS # BLD: 0 K/UL (ref 0–0.1)
BASOPHILS NFR BLD: 0 % (ref 0–1)
BILIRUB SERPL-MCNC: 0.3 MG/DL (ref 0.2–1)
BUN SERPL-MCNC: 11 MG/DL (ref 6–20)
BUN/CREAT SERPL: 15 (ref 12–20)
CALCIUM SERPL-MCNC: 9.4 MG/DL (ref 8.5–10.1)
CALCULATED P AXIS, ECG09: 16 DEGREES
CALCULATED R AXIS, ECG10: 21 DEGREES
CALCULATED T AXIS, ECG11: 39 DEGREES
CHLORIDE SERPL-SCNC: 102 MMOL/L (ref 97–108)
CO2 SERPL-SCNC: 33 MMOL/L (ref 21–32)
CREAT SERPL-MCNC: 0.71 MG/DL (ref 0.55–1.02)
DIAGNOSIS, 93000: NORMAL
DIFFERENTIAL METHOD BLD: ABNORMAL
EOSINOPHIL # BLD: 0 K/UL (ref 0–0.4)
EOSINOPHIL NFR BLD: 0 % (ref 0–7)
ERYTHROCYTE [DISTWIDTH] IN BLOOD BY AUTOMATED COUNT: 13 % (ref 11.5–14.5)
GLOBULIN SER CALC-MCNC: 4.1 G/DL (ref 2–4)
GLUCOSE SERPL-MCNC: 95 MG/DL (ref 65–100)
HCT VFR BLD AUTO: 38.7 % (ref 35–47)
HGB BLD-MCNC: 12.4 G/DL (ref 11.5–16)
IMM GRANULOCYTES # BLD AUTO: 0 K/UL (ref 0–0.04)
IMM GRANULOCYTES NFR BLD AUTO: 0 % (ref 0–0.5)
LYMPHOCYTES # BLD: 1.3 K/UL (ref 0.8–3.5)
LYMPHOCYTES NFR BLD: 19 % (ref 12–49)
MCH RBC QN AUTO: 30.5 PG (ref 26–34)
MCHC RBC AUTO-ENTMCNC: 32 G/DL (ref 30–36.5)
MCV RBC AUTO: 95.1 FL (ref 80–99)
METAMYELOCYTES NFR BLD MANUAL: 1 %
MONOCYTES # BLD: 0.5 K/UL (ref 0–1)
MONOCYTES NFR BLD: 7 % (ref 5–13)
MYELOCYTES NFR BLD MANUAL: 1 %
NEUTS BAND NFR BLD MANUAL: 2 %
NEUTS SEG # BLD: 4.9 K/UL (ref 1.8–8)
NEUTS SEG NFR BLD: 70 % (ref 32–75)
NRBC # BLD: 0 K/UL (ref 0–0.01)
NRBC BLD-RTO: 0 PER 100 WBC
P-R INTERVAL, ECG05: 148 MS
PLATELET # BLD AUTO: 418 K/UL (ref 150–400)
PMV BLD AUTO: 9.1 FL (ref 8.9–12.9)
POTASSIUM SERPL-SCNC: 2.7 MMOL/L (ref 3.5–5.1)
PROT SERPL-MCNC: 7.2 G/DL (ref 6.4–8.2)
Q-T INTERVAL, ECG07: 442 MS
QRS DURATION, ECG06: 84 MS
QTC CALCULATION (BEZET), ECG08: 419 MS
RBC # BLD AUTO: 4.07 M/UL (ref 3.8–5.2)
RBC MORPH BLD: ABNORMAL
SODIUM SERPL-SCNC: 142 MMOL/L (ref 136–145)
TROPONIN I SERPL-MCNC: <0.05 NG/ML
VENTRICULAR RATE, ECG03: 54 BPM
WBC # BLD AUTO: 6.8 K/UL (ref 3.6–11)

## 2021-09-07 PROCEDURE — 85025 COMPLETE CBC W/AUTO DIFF WBC: CPT

## 2021-09-07 PROCEDURE — 84484 ASSAY OF TROPONIN QUANT: CPT

## 2021-09-07 PROCEDURE — 74011250636 HC RX REV CODE- 250/636: Performed by: EMERGENCY MEDICINE

## 2021-09-07 PROCEDURE — 99284 EMERGENCY DEPT VISIT MOD MDM: CPT

## 2021-09-07 PROCEDURE — 96365 THER/PROPH/DIAG IV INF INIT: CPT

## 2021-09-07 PROCEDURE — 36415 COLL VENOUS BLD VENIPUNCTURE: CPT

## 2021-09-07 PROCEDURE — 96366 THER/PROPH/DIAG IV INF ADDON: CPT

## 2021-09-07 PROCEDURE — 71045 X-RAY EXAM CHEST 1 VIEW: CPT

## 2021-09-07 PROCEDURE — 80053 COMPREHEN METABOLIC PANEL: CPT

## 2021-09-07 PROCEDURE — 93005 ELECTROCARDIOGRAM TRACING: CPT

## 2021-09-07 PROCEDURE — 74011250637 HC RX REV CODE- 250/637: Performed by: EMERGENCY MEDICINE

## 2021-09-07 PROCEDURE — 96375 TX/PRO/DX INJ NEW DRUG ADDON: CPT

## 2021-09-07 RX ORDER — POTASSIUM CHLORIDE 750 MG/1
40 TABLET, FILM COATED, EXTENDED RELEASE ORAL
Status: COMPLETED | OUTPATIENT
Start: 2021-09-07 | End: 2021-09-07

## 2021-09-07 RX ORDER — MAGNESIUM SULFATE 1 G/100ML
1 INJECTION INTRAVENOUS
Status: COMPLETED | OUTPATIENT
Start: 2021-09-07 | End: 2021-09-07

## 2021-09-07 RX ORDER — POTASSIUM CHLORIDE 750 MG/1
10 TABLET, FILM COATED, EXTENDED RELEASE ORAL DAILY
Qty: 30 TABLET | Refills: 0 | Status: SHIPPED | OUTPATIENT
Start: 2021-09-07

## 2021-09-07 RX ORDER — POTASSIUM CHLORIDE 7.45 MG/ML
10 INJECTION INTRAVENOUS
Status: COMPLETED | OUTPATIENT
Start: 2021-09-07 | End: 2021-09-07

## 2021-09-07 RX ORDER — AZITHROMYCIN 250 MG/1
TABLET, FILM COATED ORAL
Qty: 6 TABLET | Refills: 0 | Status: SHIPPED | OUTPATIENT
Start: 2021-09-07 | End: 2021-09-12

## 2021-09-07 RX ORDER — PREDNISONE 20 MG/1
60 TABLET ORAL DAILY
Qty: 15 TABLET | Refills: 0 | Status: SHIPPED | OUTPATIENT
Start: 2021-09-07 | End: 2021-09-12

## 2021-09-07 RX ADMIN — MAGNESIUM SULFATE HEPTAHYDRATE 1 G: 1 INJECTION, SOLUTION INTRAVENOUS at 11:56

## 2021-09-07 RX ADMIN — METHYLPREDNISOLONE SODIUM SUCCINATE 125 MG: 125 INJECTION, POWDER, FOR SOLUTION INTRAMUSCULAR; INTRAVENOUS at 11:49

## 2021-09-07 RX ADMIN — POTASSIUM CHLORIDE 10 MEQ: 7.46 INJECTION, SOLUTION INTRAVENOUS at 13:07

## 2021-09-07 RX ADMIN — POTASSIUM CHLORIDE 40 MEQ: 750 TABLET, FILM COATED, EXTENDED RELEASE ORAL at 11:48

## 2021-09-07 NOTE — ED NOTES
Discharge instructions given to patient by Dr. Talita Cruz. Verbalized understanding of instructions. Patient discharged without difficulty. Patient discharged in stable condition ambulatory.

## 2021-09-07 NOTE — ED PROVIDER NOTES
EMERGENCY DEPARTMENT HISTORY AND PHYSICAL EXAM      Date: 9/7/2021  Patient Name: Billie Glover    History of Presenting Illness     Chief Complaint   Patient presents with    Shortness of Breath     pt got her 1st covid vaccine 08/26; tested positive for covid on 08/27. pt has hx of asthma.  quarantine is supposed to be over today. Started on steroids on Friday for increased sob. Today she feels like she is having asthma exacerbations with HAHN.  Positive For Covid-19       History Provided By: Patient    HPI: Billie Glover, 48 y.o. female with a past medical history significant for Asthma, hypertension presents to the ED with cc of moderate to severe shortness of breath and weakness over the last several days. Patient was diagnosed with COVID-19 on August 27 1 day after receiving the first dose of the vaccine. She reports that she has been on outpatient steroids and was feeling better but then feeling worse today when she tried to go back to work. She works at the [employer]. She has no other associated symptoms. No other exacerbating ameliorating factors. There are no other complaints, changes, or physical findings at this time. PCP: Barrie Olmos MD    No current facility-administered medications on file prior to encounter. Current Outpatient Medications on File Prior to Encounter   Medication Sig Dispense Refill    amLODIPine (NORVASC) 10 mg tablet Take 1 Tab by mouth daily. 30 Tab 2    aspirin 81 mg chewable tablet Take 1 Tab by mouth daily. 30 Tab 2    atorvastatin (LIPITOR) 80 mg tablet Take 1 Tab by mouth nightly. 30 Tab 2    butalbital-acetaminophen-caffeine (FIORICET, ESGIC) -40 mg per tablet Take 1 Tab by mouth every six (6) hours as needed for Headache or Migraine. 20 Tab 0    escitalopram oxalate (LEXAPRO) 10 mg tablet Take 1 Tab by mouth daily (with dinner). 30 Tab 2    levothyroxine (SYNTHROID) 300 mcg tablet Take  by mouth Daily (before breakfast).       naproxen (NAPROSYN) 500 mg tablet Take 1 Tab by mouth every twelve (12) hours as needed for Pain. 20 Tab 0    cyclobenzaprine (FLEXERIL) 10 mg tablet Take 1 Tab by mouth three (3) times daily as needed for Muscle Spasm(s). 20 Tab 0       Past History     Past Medical History:  Past Medical History:   Diagnosis Date    HTN (hypertension)        Past Surgical History:  Past Surgical History:   Procedure Laterality Date    HX OTHER SURGICAL      Right shoulder       Family History:  No family history on file. Social History:  Social History     Tobacco Use    Smoking status: Never Smoker   Substance Use Topics    Alcohol use: Not on file    Drug use: Not on file       Allergies: Allergies   Allergen Reactions    Influenza Virus Vaccines Anaphylaxis    Shellfish Derived Anaphylaxis    Betadine [Povidone-Iodine] Hives         Review of Systems   Review of Systems   Constitutional: Negative for chills, diaphoresis, fatigue and fever. HENT: Negative for ear pain and sore throat. Eyes: Negative for pain and redness. Respiratory: Positive for cough and shortness of breath. Cardiovascular: Negative for chest pain and leg swelling. Gastrointestinal: Negative for abdominal pain, diarrhea, nausea and vomiting. Endocrine: Negative for cold intolerance and heat intolerance. Genitourinary: Negative for flank pain and hematuria. Musculoskeletal: Negative for back pain and neck stiffness. Skin: Negative for rash and wound. Neurological: Negative for dizziness, syncope and headaches. All other systems reviewed and are negative. Physical Exam   Physical Exam  Vitals and nursing note reviewed. Constitutional:       Appearance: She is well-developed. HENT:      Head: Normocephalic and atraumatic. Mouth/Throat:      Pharynx: No oropharyngeal exudate. Eyes:      Conjunctiva/sclera: Conjunctivae normal.      Pupils: Pupils are equal, round, and reactive to light.    Cardiovascular: Rate and Rhythm: Normal rate and regular rhythm. Heart sounds: No murmur heard. Pulmonary:      Effort: Pulmonary effort is normal. No tachypnea, accessory muscle usage or respiratory distress. Breath sounds: Examination of the right-lower field reveals wheezing. Examination of the left-lower field reveals wheezing. Wheezing present. No decreased breath sounds or rhonchi. Abdominal:      General: Bowel sounds are normal. There is no distension. Palpations: Abdomen is soft. Tenderness: There is no abdominal tenderness. Musculoskeletal:         General: No deformity. Normal range of motion. Cervical back: Normal range of motion. Skin:     General: Skin is warm and dry. Findings: No rash. Neurological:      Mental Status: She is alert and oriented to person, place, and time.       Coordination: Coordination normal.   Psychiatric:         Behavior: Behavior normal.         Diagnostic Study Results     Labs -     Recent Results (from the past 24 hour(s))   EKG, 12 LEAD, INITIAL    Collection Time: 09/07/21 10:29 AM   Result Value Ref Range    Ventricular Rate 54 BPM    Atrial Rate 54 BPM    P-R Interval 148 ms    QRS Duration 84 ms    Q-T Interval 442 ms    QTC Calculation (Bezet) 419 ms    Calculated P Axis 16 degrees    Calculated R Axis 21 degrees    Calculated T Axis 39 degrees    Diagnosis       Sinus bradycardia with sinus arrhythmia  When compared with ECG of 05-NOV-2020 12:20,  No significant change was found     CBC WITH AUTOMATED DIFF    Collection Time: 09/07/21 10:31 AM   Result Value Ref Range    WBC 6.8 3.6 - 11.0 K/uL    RBC 4.07 3.80 - 5.20 M/uL    HGB 12.4 11.5 - 16.0 g/dL    HCT 38.7 35.0 - 47.0 %    MCV 95.1 80.0 - 99.0 FL    MCH 30.5 26.0 - 34.0 PG    MCHC 32.0 30.0 - 36.5 g/dL    RDW 13.0 11.5 - 14.5 %    PLATELET 395 (H) 845 - 400 K/uL    MPV 9.1 8.9 - 12.9 FL    NRBC 0.0 0  WBC    ABSOLUTE NRBC 0.00 0.00 - 0.01 K/uL    NEUTROPHILS 70 32 - 75 %    BAND NEUTROPHILS 2 %    LYMPHOCYTES 19 12 - 49 %    MONOCYTES 7 5 - 13 %    EOSINOPHILS 0 0 - 7 %    BASOPHILS 0 0 - 1 %    METAMYELOCYTES 1 %    MYELOCYTES 1 %    IMMATURE GRANULOCYTES 0 0.0 - 0.5 %    ABS. NEUTROPHILS 4.9 1.8 - 8.0 K/UL    ABS. LYMPHOCYTES 1.3 0.8 - 3.5 K/UL    ABS. MONOCYTES 0.5 0.0 - 1.0 K/UL    ABS. EOSINOPHILS 0.0 0.0 - 0.4 K/UL    ABS. BASOPHILS 0.0 0.0 - 0.1 K/UL    ABS. IMM. GRANS. 0.0 0.00 - 0.04 K/UL    DF MANUAL      RBC COMMENTS NORMOCYTIC, NORMOCHROMIC     METABOLIC PANEL, COMPREHENSIVE    Collection Time: 09/07/21 10:31 AM   Result Value Ref Range    Sodium 142 136 - 145 mmol/L    Potassium 2.7 (LL) 3.5 - 5.1 mmol/L    Chloride 102 97 - 108 mmol/L    CO2 33 (H) 21 - 32 mmol/L    Anion gap 7 5 - 15 mmol/L    Glucose 95 65 - 100 mg/dL    BUN 11 6 - 20 MG/DL    Creatinine 0.71 0.55 - 1.02 MG/DL    BUN/Creatinine ratio 15 12 - 20      GFR est AA >60 >60 ml/min/1.73m2    GFR est non-AA >60 >60 ml/min/1.73m2    Calcium 9.4 8.5 - 10.1 MG/DL    Bilirubin, total 0.3 0.2 - 1.0 MG/DL    ALT (SGPT) 38 12 - 78 U/L    AST (SGOT) 22 15 - 37 U/L    Alk. phosphatase 70 45 - 117 U/L    Protein, total 7.2 6.4 - 8.2 g/dL    Albumin 3.1 (L) 3.5 - 5.0 g/dL    Globulin 4.1 (H) 2.0 - 4.0 g/dL    A-G Ratio 0.8 (L) 1.1 - 2.2     TROPONIN I    Collection Time: 09/07/21 10:31 AM   Result Value Ref Range    Troponin-I, Qt. <0.05 <0.05 ng/mL       Radiologic Studies -   XR CHEST PORT   Final Result   Minimal bilateral lower lobe interstitial prominence may represent   atypical viral pneumonia. CT Results  (Last 48 hours)    None        CXR Results  (Last 48 hours)               09/07/21 1040  XR CHEST PORT Final result    Impression:  Minimal bilateral lower lobe interstitial prominence may represent   atypical viral pneumonia. Narrative: Indication: Shortness of breath       Comparison: None       Portable exam of the chest obtained at 1040 demonstrates normal heart size.    There minimal interstitial prominence is noted in the lower lobes bilaterally. The osseous structures are unremarkable. Medical Decision Making   I am the first provider for this patient. I reviewed the vital signs, available nursing notes, past medical history, past surgical history, family history and social history. Vital Signs-Reviewed the patient's vital signs. Patient Vitals for the past 12 hrs:   Temp Pulse Resp BP SpO2   09/07/21 0940 98.6 °F (37 °C) 69 16 (!) 158/109 94 %       Records Reviewed: Nursing records and medical records reviewed    MDM:  Patient presents with acute dyspnea. DDx: asthma, copd, pna, pulmonary edema, acute bronchitis, ACS, ptx, pna. Will obtain EKG, labs, CXR, provide O2 as needed for hypoxia, treat symptomatically and reassess. Will continue to monitor closely in ED. Provider Notes (Medical Decision Making):   Patient is a 58-year-old female who is 2 weeks into her diagnosis of COVID-19 pneumonia. She is not hypoxic nor tachypneic. She meets no criteria for inpatient work-up admission. She was found to be profoundly hypokalemic and she received both IV and oral potassium replacement as well as IV magnesium. She feels much better after IV steroids as well. I will put her on a short course of antibiotics, steroids, inhalers as well as potassium supplementation with close outpatient follow-up with her primary care doctor. ED Course:   Initial assessment performed. The patients presenting problems have been discussed, and they are in agreement with the care plan formulated and outlined with them. I have encouraged them to ask questions as they arise throughout their visit. Critical Care:  None      Disposition:  6:35 PM  Mio Broderickman's  results have been reviewed with her. She has been counseled regarding her diagnosis.   She verbally conveys understanding and agreement of the signs, symptoms, diagnosis, treatment and prognosis and additionally agrees to follow up as recommended with Dr. Jose Ureña MD in 24 - 48 hours. She also agrees with the care-plan and conveys that all of her questions have been answered. I have also put together some discharge instructions for her that include: 1) educational information regarding their diagnosis, 2) how to care for their diagnosis at home, as well a 3) list of reasons why they would want to return to the ED prior to their follow-up appointment, should their condition change. DISCHARGE PLAN:  1. Discharge Medication List as of 9/7/2021  1:22 PM      START taking these medications    Details   potassium chloride SR (KLOR-CON 10) 10 mEq tablet Take 1 Tablet by mouth daily. , Normal, Disp-30 Tablet, R-0      predniSONE (DELTASONE) 20 mg tablet Take 60 mg by mouth daily for 5 days. , Normal, Disp-15 Tablet, R-0      azithromycin (Zithromax Z-John) 250 mg tablet 2 tablets now, then 1 daily for 4 days, Normal, Disp-6 Tablet, R-0         CONTINUE these medications which have NOT CHANGED    Details   amLODIPine (NORVASC) 10 mg tablet Take 1 Tab by mouth daily. , Normal, Disp-30 Tab,R-2      aspirin 81 mg chewable tablet Take 1 Tab by mouth daily. , Normal, Disp-30 Tab,R-2      atorvastatin (LIPITOR) 80 mg tablet Take 1 Tab by mouth nightly., Normal, Disp-30 Tab,R-2      butalbital-acetaminophen-caffeine (FIORICET, ESGIC) -40 mg per tablet Take 1 Tab by mouth every six (6) hours as needed for Headache or Migraine., Normal, Disp-20 Tab,R-0      escitalopram oxalate (LEXAPRO) 10 mg tablet Take 1 Tab by mouth daily (with dinner). , Normal, Disp-30 Tab,R-2      levothyroxine (SYNTHROID) 300 mcg tablet Take  by mouth Daily (before breakfast). , Historical Med      naproxen (NAPROSYN) 500 mg tablet Take 1 Tab by mouth every twelve (12) hours as needed for Pain., Print, Disp-20 Tab,R-0      cyclobenzaprine (FLEXERIL) 10 mg tablet Take 1 Tab by mouth three (3) times daily as needed for Muscle Spasm(s). , Print, Disp-20 Tab,R-0           2. Follow-up Information     Follow up With Specialties Details Why Contact Info    Brenda Morrow MD Family Medicine In 2 days For a follow-up evaluation. [address] [place] [address]          3. Return to ED if worse     Diagnosis     Clinical Impression:   1. Moderate asthma with acute exacerbation, unspecified whether persistent    2. Hypokalemia        Attestations:    James Roque MD    Please note that this dictation was completed with [employer], the computer voice recognition software. Quite often unanticipated grammatical, syntax, homophones, and other interpretive errors are inadvertently transcribed by the computer software. Please disregard these errors. Please excuse any errors that have escaped final proofreading. Thank you.

## 2021-09-07 NOTE — DISCHARGE INSTRUCTIONS
It was a pleasure taking care of you at [facility] today. We know that when you come to [facility], you are entrusting us with your health, comfort, and safety. Our physicians and nurses honor that trust, and we truly appreciate the opportunity to care for you and your loved ones. We also value your feedback. If you receive a survey about your Emergency Department experience today, please fill it out. We care about our patients' feedback, and we listen to what you have to say. Thank you!

## 2021-09-14 ENCOUNTER — HOSPITAL ENCOUNTER (INPATIENT)
Age: 46
LOS: 3 days | Discharge: HOME | DRG: 643 | End: 2021-09-17
Attending: EMERGENCY MEDICINE | Admitting: INTERNAL MEDICINE
Payer: COMMERCIAL

## 2021-09-14 ENCOUNTER — APPOINTMENT (OUTPATIENT)
Dept: ULTRASOUND IMAGING | Age: 46
DRG: 643 | End: 2021-09-14
Attending: EMERGENCY MEDICINE
Payer: COMMERCIAL

## 2021-09-14 ENCOUNTER — APPOINTMENT (OUTPATIENT)
Dept: CT IMAGING | Age: 46
DRG: 643 | End: 2021-09-14
Attending: EMERGENCY MEDICINE
Payer: COMMERCIAL

## 2021-09-14 ENCOUNTER — APPOINTMENT (OUTPATIENT)
Dept: GENERAL RADIOLOGY | Age: 46
DRG: 643 | End: 2021-09-14
Attending: EMERGENCY MEDICINE
Payer: COMMERCIAL

## 2021-09-14 DIAGNOSIS — J12.82 PNEUMONIA DUE TO COVID-19 VIRUS: ICD-10-CM

## 2021-09-14 DIAGNOSIS — U07.1 PNEUMONIA DUE TO COVID-19 VIRUS: ICD-10-CM

## 2021-09-14 DIAGNOSIS — E27.49 ADRENAL HEMORRHAGE (HCC): ICD-10-CM

## 2021-09-14 DIAGNOSIS — R10.9 ABDOMINAL WALL PAIN IN RIGHT FLANK: Primary | ICD-10-CM

## 2021-09-14 LAB
ALBUMIN SERPL-MCNC: 3 G/DL (ref 3.5–5)
ALBUMIN/GLOB SERPL: 0.9 {RATIO} (ref 1.1–2.2)
ALP SERPL-CCNC: 62 U/L (ref 45–117)
ALT SERPL-CCNC: 38 U/L (ref 12–78)
ANION GAP SERPL CALC-SCNC: 3 MMOL/L (ref 5–15)
AST SERPL-CCNC: 13 U/L (ref 15–37)
ATRIAL RATE: 72 BPM
BASOPHILS # BLD: 0.1 K/UL (ref 0–0.1)
BASOPHILS NFR BLD: 1 % (ref 0–1)
BILIRUB SERPL-MCNC: 0.5 MG/DL (ref 0.2–1)
BNP SERPL-MCNC: 42 PG/ML
BUN SERPL-MCNC: 11 MG/DL (ref 6–20)
BUN/CREAT SERPL: 14 (ref 12–20)
CALCIUM SERPL-MCNC: 8.3 MG/DL (ref 8.5–10.1)
CALCULATED P AXIS, ECG09: 44 DEGREES
CALCULATED R AXIS, ECG10: 11 DEGREES
CALCULATED T AXIS, ECG11: 36 DEGREES
CHLORIDE SERPL-SCNC: 105 MMOL/L (ref 97–108)
CK SERPL-CCNC: 37 U/L (ref 26–192)
CO2 SERPL-SCNC: 33 MMOL/L (ref 21–32)
COVID-19 RAPID TEST, COVR: DETECTED
CREAT SERPL-MCNC: 0.76 MG/DL (ref 0.55–1.02)
D DIMER PPP FEU-MCNC: 2.93 MG/L FEU (ref 0–0.65)
DIAGNOSIS, 93000: NORMAL
DIFFERENTIAL METHOD BLD: ABNORMAL
EOSINOPHIL # BLD: 0.1 K/UL (ref 0–0.4)
EOSINOPHIL NFR BLD: 1 % (ref 0–7)
ERYTHROCYTE [DISTWIDTH] IN BLOOD BY AUTOMATED COUNT: 14.5 % (ref 11.5–14.5)
GLOBULIN SER CALC-MCNC: 3.5 G/DL (ref 2–4)
GLUCOSE SERPL-MCNC: 95 MG/DL (ref 65–100)
HCT VFR BLD AUTO: 36.9 % (ref 35–47)
HCT VFR BLD AUTO: 37.2 % (ref 35–47)
HGB BLD-MCNC: 11.7 G/DL (ref 11.5–16)
HGB BLD-MCNC: 11.8 G/DL (ref 11.5–16)
IMM GRANULOCYTES # BLD AUTO: 0.2 K/UL (ref 0–0.04)
IMM GRANULOCYTES NFR BLD AUTO: 2 % (ref 0–0.5)
LYMPHOCYTES # BLD: 2.2 K/UL (ref 0.8–3.5)
LYMPHOCYTES NFR BLD: 22 % (ref 12–49)
MCH RBC QN AUTO: 31.1 PG (ref 26–34)
MCHC RBC AUTO-ENTMCNC: 31.7 G/DL (ref 30–36.5)
MCV RBC AUTO: 97.9 FL (ref 80–99)
MONOCYTES # BLD: 0.7 K/UL (ref 0–1)
MONOCYTES NFR BLD: 8 % (ref 5–13)
NEUTS SEG # BLD: 6.6 K/UL (ref 1.8–8)
NEUTS SEG NFR BLD: 66 % (ref 32–75)
NRBC # BLD: 0.02 K/UL (ref 0–0.01)
NRBC BLD-RTO: 0.2 PER 100 WBC
P-R INTERVAL, ECG05: 154 MS
PLATELET # BLD AUTO: 452 K/UL (ref 150–400)
PMV BLD AUTO: 8.7 FL (ref 8.9–12.9)
POTASSIUM SERPL-SCNC: 3.4 MMOL/L (ref 3.5–5.1)
PROT SERPL-MCNC: 6.5 G/DL (ref 6.4–8.2)
Q-T INTERVAL, ECG07: 386 MS
QRS DURATION, ECG06: 80 MS
QTC CALCULATION (BEZET), ECG08: 422 MS
RBC # BLD AUTO: 3.8 M/UL (ref 3.8–5.2)
SODIUM SERPL-SCNC: 141 MMOL/L (ref 136–145)
SOURCE, COVRS: ABNORMAL
TROPONIN I SERPL-MCNC: <0.05 NG/ML
VENTRICULAR RATE, ECG03: 72 BPM
WBC # BLD AUTO: 9.8 K/UL (ref 3.6–11)

## 2021-09-14 PROCEDURE — 99283 EMERGENCY DEPT VISIT LOW MDM: CPT

## 2021-09-14 PROCEDURE — 74011250637 HC RX REV CODE- 250/637: Performed by: INTERNAL MEDICINE

## 2021-09-14 PROCEDURE — 93005 ELECTROCARDIOGRAM TRACING: CPT

## 2021-09-14 PROCEDURE — 94640 AIRWAY INHALATION TREATMENT: CPT

## 2021-09-14 PROCEDURE — 96374 THER/PROPH/DIAG INJ IV PUSH: CPT

## 2021-09-14 PROCEDURE — 82550 ASSAY OF CK (CPK): CPT

## 2021-09-14 PROCEDURE — 76770 US EXAM ABDO BACK WALL COMP: CPT

## 2021-09-14 PROCEDURE — 74177 CT ABD & PELVIS W/CONTRAST: CPT

## 2021-09-14 PROCEDURE — 85025 COMPLETE CBC W/AUTO DIFF WBC: CPT

## 2021-09-14 PROCEDURE — 94761 N-INVAS EAR/PLS OXIMETRY MLT: CPT

## 2021-09-14 PROCEDURE — 36415 COLL VENOUS BLD VENIPUNCTURE: CPT

## 2021-09-14 PROCEDURE — 74011000636 HC RX REV CODE- 636: Performed by: EMERGENCY MEDICINE

## 2021-09-14 PROCEDURE — 85018 HEMOGLOBIN: CPT

## 2021-09-14 PROCEDURE — 85379 FIBRIN DEGRADATION QUANT: CPT

## 2021-09-14 PROCEDURE — 74011250636 HC RX REV CODE- 250/636: Performed by: INTERNAL MEDICINE

## 2021-09-14 PROCEDURE — 87635 SARS-COV-2 COVID-19 AMP PRB: CPT

## 2021-09-14 PROCEDURE — 65270000029 HC RM PRIVATE

## 2021-09-14 PROCEDURE — 74011250636 HC RX REV CODE- 250/636: Performed by: EMERGENCY MEDICINE

## 2021-09-14 PROCEDURE — 83880 ASSAY OF NATRIURETIC PEPTIDE: CPT

## 2021-09-14 PROCEDURE — 71275 CT ANGIOGRAPHY CHEST: CPT

## 2021-09-14 PROCEDURE — 84484 ASSAY OF TROPONIN QUANT: CPT

## 2021-09-14 PROCEDURE — 80053 COMPREHEN METABOLIC PANEL: CPT

## 2021-09-14 PROCEDURE — 71046 X-RAY EXAM CHEST 2 VIEWS: CPT

## 2021-09-14 RX ORDER — AMLODIPINE BESYLATE 5 MG/1
10 TABLET ORAL DAILY
Status: DISCONTINUED | OUTPATIENT
Start: 2021-09-15 | End: 2021-09-17 | Stop reason: HOSPADM

## 2021-09-14 RX ORDER — MORPHINE SULFATE 2 MG/ML
4 INJECTION, SOLUTION INTRAMUSCULAR; INTRAVENOUS
Status: COMPLETED | OUTPATIENT
Start: 2021-09-14 | End: 2021-09-14

## 2021-09-14 RX ORDER — LEVOTHYROXINE SODIUM 150 UG/1
300 TABLET ORAL
Status: DISCONTINUED | OUTPATIENT
Start: 2021-09-15 | End: 2021-09-17 | Stop reason: HOSPADM

## 2021-09-14 RX ORDER — ACETAMINOPHEN 325 MG/1
650 TABLET ORAL
Status: DISCONTINUED | OUTPATIENT
Start: 2021-09-14 | End: 2021-09-14 | Stop reason: SDUPTHER

## 2021-09-14 RX ORDER — POLYETHYLENE GLYCOL 3350 17 G/17G
17 POWDER, FOR SOLUTION ORAL DAILY PRN
Status: DISCONTINUED | OUTPATIENT
Start: 2021-09-14 | End: 2021-09-17 | Stop reason: HOSPADM

## 2021-09-14 RX ORDER — MORPHINE SULFATE 2 MG/ML
4 INJECTION, SOLUTION INTRAMUSCULAR; INTRAVENOUS
Status: DISCONTINUED | OUTPATIENT
Start: 2021-09-14 | End: 2021-09-14

## 2021-09-14 RX ORDER — ONDANSETRON 4 MG/1
4 TABLET, ORALLY DISINTEGRATING ORAL
Status: DISCONTINUED | OUTPATIENT
Start: 2021-09-14 | End: 2021-09-17 | Stop reason: HOSPADM

## 2021-09-14 RX ORDER — FUROSEMIDE 20 MG/1
20 TABLET ORAL DAILY
COMMUNITY
Start: 2021-08-06

## 2021-09-14 RX ORDER — SODIUM CHLORIDE 0.9 % (FLUSH) 0.9 %
5-40 SYRINGE (ML) INJECTION AS NEEDED
Status: DISCONTINUED | OUTPATIENT
Start: 2021-09-14 | End: 2021-09-17 | Stop reason: HOSPADM

## 2021-09-14 RX ORDER — OXYCODONE AND ACETAMINOPHEN 5; 325 MG/1; MG/1
1 TABLET ORAL
Status: DISCONTINUED | OUTPATIENT
Start: 2021-09-14 | End: 2021-09-17 | Stop reason: HOSPADM

## 2021-09-14 RX ORDER — POTASSIUM CHLORIDE 750 MG/1
20 TABLET, FILM COATED, EXTENDED RELEASE ORAL
Status: COMPLETED | OUTPATIENT
Start: 2021-09-14 | End: 2021-09-14

## 2021-09-14 RX ORDER — PROPRANOLOL HYDROCHLORIDE 20 MG/1
20 TABLET ORAL 2 TIMES DAILY
COMMUNITY
Start: 2021-08-06

## 2021-09-14 RX ORDER — MORPHINE SULFATE 2 MG/ML
1 INJECTION, SOLUTION INTRAMUSCULAR; INTRAVENOUS
Status: DISCONTINUED | OUTPATIENT
Start: 2021-09-14 | End: 2021-09-17 | Stop reason: HOSPADM

## 2021-09-14 RX ORDER — FLUTICASONE PROPIONATE AND SALMETEROL 100; 50 UG/1; UG/1
POWDER RESPIRATORY (INHALATION)
COMMUNITY
Start: 2021-08-24

## 2021-09-14 RX ORDER — PROPRANOLOL HYDROCHLORIDE 10 MG/1
20 TABLET ORAL 2 TIMES DAILY
Status: DISCONTINUED | OUTPATIENT
Start: 2021-09-15 | End: 2021-09-17 | Stop reason: HOSPADM

## 2021-09-14 RX ORDER — POTASSIUM CHLORIDE 750 MG/1
10 TABLET, FILM COATED, EXTENDED RELEASE ORAL DAILY
Status: DISCONTINUED | OUTPATIENT
Start: 2021-09-15 | End: 2021-09-17 | Stop reason: HOSPADM

## 2021-09-14 RX ORDER — CHLORTHALIDONE 25 MG/1
25 TABLET ORAL DAILY
COMMUNITY
Start: 2021-08-06

## 2021-09-14 RX ORDER — ALBUTEROL SULFATE 90 UG/1
2 AEROSOL, METERED RESPIRATORY (INHALATION)
Status: DISCONTINUED | OUTPATIENT
Start: 2021-09-14 | End: 2021-09-17 | Stop reason: HOSPADM

## 2021-09-14 RX ORDER — CYCLOBENZAPRINE HCL 10 MG
10 TABLET ORAL
Status: DISCONTINUED | OUTPATIENT
Start: 2021-09-14 | End: 2021-09-17 | Stop reason: HOSPADM

## 2021-09-14 RX ORDER — FLUTICASONE PROPIONATE 50 MCG
SPRAY, SUSPENSION (ML) NASAL
COMMUNITY
Start: 2021-09-03

## 2021-09-14 RX ORDER — ACETAMINOPHEN 325 MG/1
650 TABLET ORAL
Status: DISCONTINUED | OUTPATIENT
Start: 2021-09-14 | End: 2021-09-17 | Stop reason: HOSPADM

## 2021-09-14 RX ORDER — ONDANSETRON 2 MG/ML
4 INJECTION INTRAMUSCULAR; INTRAVENOUS
Status: DISCONTINUED | OUTPATIENT
Start: 2021-09-14 | End: 2021-09-17 | Stop reason: HOSPADM

## 2021-09-14 RX ORDER — ASCORBIC ACID 500 MG
1000 TABLET ORAL DAILY
Status: DISCONTINUED | OUTPATIENT
Start: 2021-09-15 | End: 2021-09-17 | Stop reason: HOSPADM

## 2021-09-14 RX ORDER — FUROSEMIDE 20 MG/1
20 TABLET ORAL DAILY
Status: DISCONTINUED | OUTPATIENT
Start: 2021-09-15 | End: 2021-09-17 | Stop reason: HOSPADM

## 2021-09-14 RX ORDER — SODIUM CHLORIDE 0.9 % (FLUSH) 0.9 %
5-40 SYRINGE (ML) INJECTION EVERY 8 HOURS
Status: DISCONTINUED | OUTPATIENT
Start: 2021-09-14 | End: 2021-09-17 | Stop reason: HOSPADM

## 2021-09-14 RX ORDER — BUTALBITAL, ACETAMINOPHEN AND CAFFEINE 50; 325; 40 MG/1; MG/1; MG/1
1 TABLET ORAL
Status: DISCONTINUED | OUTPATIENT
Start: 2021-09-14 | End: 2021-09-17 | Stop reason: HOSPADM

## 2021-09-14 RX ORDER — ACETAMINOPHEN 650 MG/1
650 SUPPOSITORY RECTAL
Status: DISCONTINUED | OUTPATIENT
Start: 2021-09-14 | End: 2021-09-17 | Stop reason: HOSPADM

## 2021-09-14 RX ORDER — ZINC SULFATE 50(220)MG
1 CAPSULE ORAL DAILY
Status: DISCONTINUED | OUTPATIENT
Start: 2021-09-15 | End: 2021-09-17 | Stop reason: HOSPADM

## 2021-09-14 RX ORDER — ATORVASTATIN CALCIUM 40 MG/1
80 TABLET, FILM COATED ORAL
Status: DISCONTINUED | OUTPATIENT
Start: 2021-09-14 | End: 2021-09-17 | Stop reason: HOSPADM

## 2021-09-14 RX ORDER — GUAIFENESIN 600 MG/1
600 TABLET, EXTENDED RELEASE ORAL EVERY 12 HOURS
Status: DISCONTINUED | OUTPATIENT
Start: 2021-09-14 | End: 2021-09-17 | Stop reason: HOSPADM

## 2021-09-14 RX ORDER — KETOROLAC TROMETHAMINE 30 MG/ML
30 INJECTION, SOLUTION INTRAMUSCULAR; INTRAVENOUS
Status: COMPLETED | OUTPATIENT
Start: 2021-09-14 | End: 2021-09-14

## 2021-09-14 RX ORDER — SODIUM CHLORIDE 9 MG/ML
75 INJECTION, SOLUTION INTRAVENOUS CONTINUOUS
Status: DISCONTINUED | OUTPATIENT
Start: 2021-09-14 | End: 2021-09-17 | Stop reason: HOSPADM

## 2021-09-14 RX ORDER — FLUTICASONE PROPIONATE 50 MCG
2 SPRAY, SUSPENSION (ML) NASAL DAILY
Status: DISCONTINUED | OUTPATIENT
Start: 2021-09-15 | End: 2021-09-17 | Stop reason: HOSPADM

## 2021-09-14 RX ORDER — ONDANSETRON 2 MG/ML
4 INJECTION INTRAMUSCULAR; INTRAVENOUS
Status: DISCONTINUED | OUTPATIENT
Start: 2021-09-14 | End: 2021-09-14 | Stop reason: SDUPTHER

## 2021-09-14 RX ORDER — ESCITALOPRAM OXALATE 10 MG/1
10 TABLET ORAL
Status: DISCONTINUED | OUTPATIENT
Start: 2021-09-15 | End: 2021-09-17 | Stop reason: HOSPADM

## 2021-09-14 RX ADMIN — ALBUTEROL SULFATE 2 PUFF: 90 AEROSOL, METERED RESPIRATORY (INHALATION) at 21:39

## 2021-09-14 RX ADMIN — KETOROLAC TROMETHAMINE 30 MG: 30 INJECTION, SOLUTION INTRAMUSCULAR; INTRAVENOUS at 16:10

## 2021-09-14 RX ADMIN — GUAIFENESIN 600 MG: 600 TABLET, EXTENDED RELEASE ORAL at 20:43

## 2021-09-14 RX ADMIN — SODIUM CHLORIDE 75 ML/HR: 9 INJECTION, SOLUTION INTRAVENOUS at 20:41

## 2021-09-14 RX ADMIN — Medication 10 ML: at 23:32

## 2021-09-14 RX ADMIN — POTASSIUM CHLORIDE 20 MEQ: 750 TABLET, FILM COATED, EXTENDED RELEASE ORAL at 20:43

## 2021-09-14 RX ADMIN — IOPAMIDOL 100 ML: 755 INJECTION, SOLUTION INTRAVENOUS at 16:58

## 2021-09-14 RX ADMIN — ATORVASTATIN CALCIUM 80 MG: 40 TABLET, FILM COATED ORAL at 23:32

## 2021-09-14 RX ADMIN — MORPHINE SULFATE 4 MG: 2 INJECTION, SOLUTION INTRAMUSCULAR; INTRAVENOUS at 19:16

## 2021-09-14 RX ADMIN — OXYCODONE HYDROCHLORIDE AND ACETAMINOPHEN 1 TABLET: 5; 325 TABLET ORAL at 23:44

## 2021-09-14 NOTE — ED NOTES
Bedside and Verbal shift change report given to Eduardo Luis (oncoming nurse) by Audra Jackson RN (offgoing nurse). Report included the following information SBAR, ED Summary, Intake/Output, MAR and Recent Results.

## 2021-09-14 NOTE — H&P
Hospitalist Admission Note    NAME: Lazaro Vazquez   :  1975   MRN:  [id number]     Date/Time:  2021 6:53 PM    Patient PCP: Annie Escamilla  ______________________________________________________________________  Given the patient's current clinical presentation, I have a high level of concern for decompensation if discharged from the emergency department. Complex decision making was performed, which includes reviewing the patient's available past medical records, laboratory results, and x-ray films. My assessment of this patient's clinical condition and my plan of care is as follows. Assessment / Plan:  Right lower quadrant/flank abdominal pain POA  Suspected right adrenal hemorrhage POA-hypodense lesion of 4.3 cm on CT   in the setting of recently diagnosed COVID-19 infection with pneumonia POA-  Covid test positive as outpatient  D-dimer 2.93  CTA chest negative for PE, bilateral Covid pneumonia much improved noted  CT abdomen and pelvis= 4.3 cm right adrenal/adnexal lesion/mass with questionable hemorrhage  proBNP 42  Troponin negative  Potassium 3.4    Admit to remote telemetry bed with COVID-19 restrictions/droplet plus precautions  Patient is not hypoxic and stable from COVID-19 standpoint  Pain management for right lower quadrant abdominal pain  Hemoglobin stable  Inpatient hematology consulted for further recommendation  Hold off home aspirin for now-patient has not been on it for a while after she was discharged on it many months ago for TIA  Replenish potassium p.o., check BMP in a.m.   Consider inpatient surgery evaluation if pain continues to worsen, check renal/retroperitoneal ultrasound  Continue to treat supportively for COVID-19 pneumonia    Acquired hypothyroidism status post thyroid cancer status post surgery POA  Continue home Synthroid dose at 300 mcg daily    Hypertension  Continue amlodipine, propranolol  Holding chlorthalidone for now    Hyperlipidemia  Continue Lipitor    Anxiety disorder  Continue Lexapro          Code Status: Full code    DVT Prophylaxis: SCDs for now  GI Prophylaxis: not indicated    Baseline: Patient is independent with ADLs at home      Subjective:   CHIEF COMPLAINT: Right-sided abdominal pain with cough and shortness of breath    HISTORY OF PRESENT ILLNESS:     Yolie Acuna is a 48 y.o.  female who presents with above complaints from home ambulatory. Patient present with chief complaint of worsening shortness of breath with associated right-sided abdominal pain that started in the past couple of days. History of having COVID-19 infection diagnosed on 8/27-was seen in ED was sent home on oral medications as patient was stable and not hypoxic. History of taking 1 shot for COVID-19 vaccine but was due for his second shot which she never got before becoming positive for Covid. History of completing Z-John and steroids. History of associated atypical chest pain but more right lower quadrant abdominal pain as per patient. History of thyroid cancer in the past requiring resection, history of oral cyst requiring surgical excisions     Patient was found to be hemodynamically stable with mild hypokalemia with otherwise unremarkable blood work in the ED, found to have 4.3 cm hypodense lesion in the right adrenal gland on CT imaging. With negative CTA chest for any PE but positive for improving bilateral Covid pneumonia. We were asked to admit for work up and evaluation of the above problems.      Past Medical History:   Diagnosis Date    HTN (hypertension)         Past Surgical History:   Procedure Laterality Date    HX OTHER SURGICAL      Right shoulder       Social History     Tobacco Use    Smoking status: Never Smoker    Smokeless tobacco: Never Used   Substance Use Topics    Alcohol use: Not on file      Family history  Denies any cancer in family      Allergies   Allergen Reactions    Influenza Virus Vaccines Anaphylaxis    Shellfish Derived Anaphylaxis    Betadine [Povidone-Iodine] Hives        Prior to Admission medications    Medication Sig Start Date End Date Taking? Authorizing Provider   potassium chloride SR (KLOR-CON 10) 10 mEq tablet Take 1 Tablet by mouth daily. 9/7/21   Eros Emery MD   amLODIPine (NORVASC) 10 mg tablet Take 1 Tab by mouth daily. 11/9/20   Antonio Diaz MD   aspirin 81 mg chewable tablet Take 1 Tab by mouth daily. 11/9/20   Antonio Diaz MD   atorvastatin (LIPITOR) 80 mg tablet Take 1 Tab by mouth nightly. 11/8/20   Antonio Diaz MD   butalbital-acetaminophen-caffeine (FIORICET, ESGIC) -40 mg per tablet Take 1 Tab by mouth every six (6) hours as needed for Headache or Migraine. 11/8/20   Antonio Diaz MD   escitalopram oxalate (LEXAPRO) 10 mg tablet Take 1 Tab by mouth daily (with dinner). 11/8/20   Antonio Diaz MD   levothyroxine (SYNTHROID) 300 mcg tablet Take  by mouth Daily (before breakfast). Provider, Maryan   naproxen (NAPROSYN) 500 mg tablet Take 1 Tab by mouth every twelve (12) hours as needed for Pain.  8/27/20   Nathaly Regan MD   cyclobenzaprine (FLEXERIL) 10 mg tablet Take 1 Tab by mouth three (3) times daily as needed for Muscle Spasm(s). 8/27/20   Nathaly Regan MD       REVIEW OF SYSTEMS:     Total of 12 systems reviewed as follows:       POSITIVE= underlined text  Negative = text not underlined  General:  fever, chills, sweats, generalized weakness, weight loss/gain,      loss of appetite   Eyes:    blurred vision, eye pain, loss of vision, double vision  ENT:    rhinorrhea, pharyngitis   Respiratory:   cough, sputum production, SOB, HAHN, wheezing, pleuritic pain   Cardiology:   chest pain, palpitations, orthopnea, PND, edema, syncope   Gastrointestinal:  abdominal pain , N/V, diarrhea, dysphagia, constipation, bleeding   Genitourinary:  frequency, urgency, dysuria, hematuria, incontinence   Muskuloskeletal : arthralgia, myalgia, back pain  Hematology:  easy bruising, nose or gum bleeding, lymphadenopathy   Dermatological: rash, ulceration, pruritis, color change / jaundice  Endocrine:   hot flashes or polydipsia   Neurological:  headache, dizziness, confusion, focal weakness, paresthesia,     Speech difficulties, memory loss, gait difficulty  Psychological: Feelings of anxiety, depression, agitation    Objective:   VITALS:    Visit Vitals  BP (!) 141/84 (BP 1 Location: Left upper arm, BP Patient Position: Sitting)   Pulse 77   Temp 98.8 °F (37.1 °C)   Resp 28   Ht 5' 6\" (1.676 m)   Wt 123.9 kg (273 lb 2.4 oz)   SpO2 99%   BMI 44.09 kg/m²       PHYSICAL EXAM:    General:    Alert, cooperative, no distress, appears stated age. HEENT: Atraumatic, anicteric sclerae, pink conjunctivae     No oral ulcers, mucosa moist, throat clear, dentition fair  Neck:  Supple, symmetrical,  thyroid: non tender  Lungs:   Clear to auscultation bilaterally. No Wheezing or Rhonchi. No rales. Chest wall:  No tenderness  No Accessory muscle use. Heart:   Regular  rhythm,  No  murmur   No edema  Abdomen:   Soft mild right lower quadrant/flank tenderness noted+. Not distended. Bowel sounds normal  Extremities: No cyanosis. No clubbing,      Skin turgor normal, Capillary refill normal, Radial dial pulse 2+  Skin:     Not pale. Not Jaundiced  No rashes   Psych:  Good insight. Not depressed. Not anxious or agitated. Neurologic: EOMs intact. No facial asymmetry. No aphasia or slurred speech. Symmetrical strength, Sensation grossly intact.  Alert and oriented X 4.     _______________________________________________________________________  Care Plan discussed with:    Comments   Patient x    Family      RN x    Care Manager                    Consultant:  brit Grady Form   _______________________________________________________________________  Expected  Disposition:   Home with Family x   HH/PT/OT/RN    SNF/LTC    [facility] ________________________________________________________________________  TOTAL TIME:  46 Minutes    Critical Care Provided     Minutes non procedure based      Comments    x Reviewed previous records   >50% of visit spent in counseling and coordination of care x Discussion with patient and questions answered       ________________________________________________________________________  Signed: Leobardo Kelsey MD    Procedures: see electronic medical records for all procedures/Xrays and details which were not copied into this note but were reviewed prior to creation of Plan. LAB DATA REVIEWED:    Recent Results (from the past 24 hour(s))   EKG, 12 LEAD, INITIAL    Collection Time: 09/14/21  2:20 PM   Result Value Ref Range    Ventricular Rate 72 BPM    Atrial Rate 72 BPM    P-R Interval 154 ms    QRS Duration 80 ms    Q-T Interval 386 ms    QTC Calculation (Bezet) 422 ms    Calculated P Axis 44 degrees    Calculated R Axis 11 degrees    Calculated T Axis 36 degrees    Diagnosis       Normal sinus rhythm  When compared with ECG of 07-SEP-2021 10:29,  No significant change was found  Confirmed by Aris El ([id number]) on 9/14/2021 4:55:59 PM     CBC WITH AUTOMATED DIFF    Collection Time: 09/14/21  2:46 PM   Result Value Ref Range    WBC 9.8 3.6 - 11.0 K/uL    RBC 3.80 3.80 - 5.20 M/uL    HGB 11.8 11.5 - 16.0 g/dL    HCT 37.2 35.0 - 47.0 %    MCV 97.9 80.0 - 99.0 FL    MCH 31.1 26.0 - 34.0 PG    MCHC 31.7 30.0 - 36.5 g/dL    RDW 14.5 11.5 - 14.5 %    PLATELET 630 (H) 022 - 400 K/uL    MPV 8.7 (L) 8.9 - 12.9 FL    NRBC 0.2 (H) 0  WBC    ABSOLUTE NRBC 0.02 (H) 0.00 - 0.01 K/uL    NEUTROPHILS 66 32 - 75 %    LYMPHOCYTES 22 12 - 49 %    MONOCYTES 8 5 - 13 %    EOSINOPHILS 1 0 - 7 %    BASOPHILS 1 0 - 1 %    IMMATURE GRANULOCYTES 2 (H) 0.0 - 0.5 %    ABS. NEUTROPHILS 6.6 1.8 - 8.0 K/UL    ABS. LYMPHOCYTES 2.2 0.8 - 3.5 K/UL    ABS. MONOCYTES 0.7 0.0 - 1.0 K/UL    ABS.  EOSINOPHILS 0.1 0.0 - 0.4 K/UL ABS. BASOPHILS 0.1 0.0 - 0.1 K/UL    ABS. IMM. GRANS. 0.2 (H) 0.00 - 0.04 K/UL    DF AUTOMATED     METABOLIC PANEL, COMPREHENSIVE    Collection Time: 09/14/21  2:46 PM   Result Value Ref Range    Sodium 141 136 - 145 mmol/L    Potassium 3.4 (L) 3.5 - 5.1 mmol/L    Chloride 105 97 - 108 mmol/L    CO2 33 (H) 21 - 32 mmol/L    Anion gap 3 (L) 5 - 15 mmol/L    Glucose 95 65 - 100 mg/dL    BUN 11 6 - 20 MG/DL    Creatinine 0.76 0.55 - 1.02 MG/DL    BUN/Creatinine ratio 14 12 - 20      GFR est AA >60 >60 ml/min/1.73m2    GFR est non-AA >60 >60 ml/min/1.73m2    Calcium 8.3 (L) 8.5 - 10.1 MG/DL    Bilirubin, total 0.5 0.2 - 1.0 MG/DL    ALT (SGPT) 38 12 - 78 U/L    AST (SGOT) 13 (L) 15 - 37 U/L    Alk.  phosphatase 62 45 - 117 U/L    Protein, total 6.5 6.4 - 8.2 g/dL    Albumin 3.0 (L) 3.5 - 5.0 g/dL    Globulin 3.5 2.0 - 4.0 g/dL    A-G Ratio 0.9 (L) 1.1 - 2.2     CK W/ REFLX CKMB    Collection Time: 09/14/21  2:46 PM   Result Value Ref Range    CK 37 26 - 192 U/L   TROPONIN I    Collection Time: 09/14/21  2:46 PM   Result Value Ref Range    Troponin-I, Qt. <0.05 <0.05 ng/mL   NT-PRO BNP    Collection Time: 09/14/21  2:46 PM   Result Value Ref Range    NT pro-BNP 42 <125 PG/ML

## 2021-09-14 NOTE — ED PROVIDER NOTES
EMERGENCY DEPARTMENT HISTORY AND PHYSICAL EXAM      Please note that this dictation was completed with the assistance of \"Dragon\", the computer voice recognition software. Quite often unanticipated grammatical, syntax, homophones, and other interpretive errors are inadvertently transcribed by the computer software. Please disregard these errors and any errors that have escaped final proofreading. Thank you. Patient Name: Joseph Bailey  : 1975  MRN: [id number]  History of Presenting Illness     Chief Complaint   Patient presents with    Shortness of Breath     SOB - patient was Covid + . She was given some medicine but she feels like her SOB is worse. Her sats are 98% in triage.  Chest Pain     History Provided By: Patient    HPI: Joseph Bailey, 48 y.o. female with past medical history as documented below presents to the ED with c/o of acute onset of severe CP and abd pain. States she was diagnosed with COVID on . She finished Z-damian and steroids. Pain is worsened with movement. Pt denies any other exacerbating or ameliorating factors. Additionally, pt specifically denies any recent fever, chills, headache, nausea, vomiting, SOB, lightheadedness, dizziness, numbness, weakness, lower extremity swelling, heart palpitations, urinary sxs, diarrhea, constipation, melena, hematochezia, cough, or congestion. There are no other complaints, changes or physical findings pertinent to the HPI at this time. PCP: BROCK Weaver    Past History   Past Medical History:  Past Medical History:   Diagnosis Date    HTN (hypertension)        Past Surgical History:  Past Surgical History:   Procedure Laterality Date    HX OTHER SURGICAL      Right shoulder       Family History:  Family history reviewed and non-contributory  No family history on file.       Social History:  Social History     Tobacco Use    Smoking status: Never Smoker    Smokeless tobacco: Never Used   Substance Use Topics    Alcohol use: Not on file    Drug use: Not Currently       Allergies: Allergies   Allergen Reactions    Influenza Virus Vaccines Anaphylaxis    Shellfish Derived Anaphylaxis    Betadine [Povidone-Iodine] Hives       Current Medications:  No current facility-administered medications on file prior to encounter. Current Outpatient Medications on File Prior to Encounter   Medication Sig Dispense Refill    propranoloL (INDERAL) 20 mg tablet Take 20 mg by mouth two (2) times a day.  furosemide (LASIX) 20 mg tablet Take 20 mg by mouth daily.  fluticasone propionate (FLONASE) 50 mcg/actuation nasal spray USE 2 SPRAY(S) IN EACH NOSTRIL ONCE DAILY      fluticasone propion-salmeteroL (ADVAIR/WIXELA) 100-50 mcg/dose diskus inhaler       chlorthalidone (HYGROTON) 25 mg tablet Take 25 mg by mouth daily.  potassium chloride SR (KLOR-CON 10) 10 mEq tablet Take 1 Tablet by mouth daily. 30 Tablet 0    amLODIPine (NORVASC) 10 mg tablet Take 1 Tab by mouth daily. 30 Tab 2    aspirin 81 mg chewable tablet Take 1 Tab by mouth daily. 30 Tab 2    atorvastatin (LIPITOR) 80 mg tablet Take 1 Tab by mouth nightly. 30 Tab 2    butalbital-acetaminophen-caffeine (FIORICET, ESGIC) -40 mg per tablet Take 1 Tab by mouth every six (6) hours as needed for Headache or Migraine. 20 Tab 0    escitalopram oxalate (LEXAPRO) 10 mg tablet Take 1 Tab by mouth daily (with dinner). 30 Tab 2    levothyroxine (SYNTHROID) 300 mcg tablet Take  by mouth Daily (before breakfast).  naproxen (NAPROSYN) 500 mg tablet Take 1 Tab by mouth every twelve (12) hours as needed for Pain. 20 Tab 0    cyclobenzaprine (FLEXERIL) 10 mg tablet Take 1 Tab by mouth three (3) times daily as needed for Muscle Spasm(s). 20 Tab 0     Review of Systems   A complete ROS was reviewed by me today and all other systems negative, unless otherwise specified below:  Review of Systems   Constitutional: Negative. Negative for chills and fever.    HENT: Negative. Negative for congestion and sore throat. Eyes: Negative. Respiratory: Positive for chest tightness and shortness of breath. Negative for cough and wheezing. Cardiovascular: Positive for chest pain. Negative for palpitations and leg swelling. Gastrointestinal: Positive for abdominal pain. Negative for abdominal distention, blood in stool, constipation, diarrhea, nausea and vomiting. Endocrine: Negative. Genitourinary: Negative. Negative for dysuria, flank pain, frequency, hematuria and urgency. Musculoskeletal: Negative. Negative for arthralgias, back pain and myalgias. Skin: Negative. Negative for color change and rash. Neurological: Negative. Negative for dizziness, syncope, speech difficulty, weakness, light-headedness, numbness and headaches. Hematological: Negative. Psychiatric/Behavioral: Negative. Negative for confusion and self-injury. The patient is not nervous/anxious. All other systems reviewed and are negative. Physical Exam   Physical Exam  Vitals and nursing note reviewed. Constitutional:       General: She is in acute distress. Appearance: She is well-developed. She is ill-appearing, toxic-appearing and diaphoretic. HENT:      Head: Normocephalic and atraumatic. Mouth/Throat:      Pharynx: No oropharyngeal exudate. Eyes:      Conjunctiva/sclera: Conjunctivae normal.      Pupils: Pupils are equal, round, and reactive to light. Cardiovascular:      Rate and Rhythm: Normal rate and regular rhythm. Heart sounds: Normal heart sounds. No murmur heard. No friction rub. No gallop. Pulmonary:      Effort: Pulmonary effort is normal. No respiratory distress. Breath sounds: Normal breath sounds. No wheezing or rales. Chest:      Chest wall: No tenderness. Abdominal:      General: Bowel sounds are normal. There is no distension. Palpations: Abdomen is soft. There is no mass. Tenderness:  There is no abdominal tenderness. There is no guarding or rebound. Musculoskeletal:         General: No tenderness or deformity. Normal range of motion. Cervical back: Normal range of motion. Skin:     General: Skin is warm. Findings: No rash. Neurological:      Mental Status: She is alert and oriented to person, place, and time. Cranial Nerves: No cranial nerve deficit. Motor: No abnormal muscle tone. Coordination: Coordination normal.      Deep Tendon Reflexes: Reflexes normal.         Diagnostic Study Results     Labs -   I have personally reviewed and interpreted all available laboratory results. Recent Results (from the past 24 hour(s))   EKG, 12 LEAD, INITIAL    Collection Time: 09/14/21  2:20 PM   Result Value Ref Range    Ventricular Rate 72 BPM    Atrial Rate 72 BPM    P-R Interval 154 ms    QRS Duration 80 ms    Q-T Interval 386 ms    QTC Calculation (Bezet) 422 ms    Calculated P Axis 44 degrees    Calculated R Axis 11 degrees    Calculated T Axis 36 degrees    Diagnosis       Normal sinus rhythm  When compared with ECG of 07-SEP-2021 10:29,  No significant change was found  Confirmed by Aris El ([id number]) on 9/14/2021 4:55:59 PM     CBC WITH AUTOMATED DIFF    Collection Time: 09/14/21  2:46 PM   Result Value Ref Range    WBC 9.8 3.6 - 11.0 K/uL    RBC 3.80 3.80 - 5.20 M/uL    HGB 11.8 11.5 - 16.0 g/dL    HCT 37.2 35.0 - 47.0 %    MCV 97.9 80.0 - 99.0 FL    MCH 31.1 26.0 - 34.0 PG    MCHC 31.7 30.0 - 36.5 g/dL    RDW 14.5 11.5 - 14.5 %    PLATELET 450 (H) 821 - 400 K/uL    MPV 8.7 (L) 8.9 - 12.9 FL    NRBC 0.2 (H) 0  WBC    ABSOLUTE NRBC 0.02 (H) 0.00 - 0.01 K/uL    NEUTROPHILS 66 32 - 75 %    LYMPHOCYTES 22 12 - 49 %    MONOCYTES 8 5 - 13 %    EOSINOPHILS 1 0 - 7 %    BASOPHILS 1 0 - 1 %    IMMATURE GRANULOCYTES 2 (H) 0.0 - 0.5 %    ABS. NEUTROPHILS 6.6 1.8 - 8.0 K/UL    ABS. LYMPHOCYTES 2.2 0.8 - 3.5 K/UL    ABS. MONOCYTES 0.7 0.0 - 1.0 K/UL    ABS.  EOSINOPHILS 0.1 0.0 - 0.4 K/UL ABS. BASOPHILS 0.1 0.0 - 0.1 K/UL    ABS. IMM. GRANS. 0.2 (H) 0.00 - 0.04 K/UL    DF AUTOMATED     METABOLIC PANEL, COMPREHENSIVE    Collection Time: 09/14/21  2:46 PM   Result Value Ref Range    Sodium 141 136 - 145 mmol/L    Potassium 3.4 (L) 3.5 - 5.1 mmol/L    Chloride 105 97 - 108 mmol/L    CO2 33 (H) 21 - 32 mmol/L    Anion gap 3 (L) 5 - 15 mmol/L    Glucose 95 65 - 100 mg/dL    BUN 11 6 - 20 MG/DL    Creatinine 0.76 0.55 - 1.02 MG/DL    BUN/Creatinine ratio 14 12 - 20      GFR est AA >60 >60 ml/min/1.73m2    GFR est non-AA >60 >60 ml/min/1.73m2    Calcium 8.3 (L) 8.5 - 10.1 MG/DL    Bilirubin, total 0.5 0.2 - 1.0 MG/DL    ALT (SGPT) 38 12 - 78 U/L    AST (SGOT) 13 (L) 15 - 37 U/L    Alk. phosphatase 62 45 - 117 U/L    Protein, total 6.5 6.4 - 8.2 g/dL    Albumin 3.0 (L) 3.5 - 5.0 g/dL    Globulin 3.5 2.0 - 4.0 g/dL    A-G Ratio 0.9 (L) 1.1 - 2.2     CK W/ REFLX CKMB    Collection Time: 09/14/21  2:46 PM   Result Value Ref Range    CK 37 26 - 192 U/L   TROPONIN I    Collection Time: 09/14/21  2:46 PM   Result Value Ref Range    Troponin-I, Qt. <0.05 <0.05 ng/mL   NT-PRO BNP    Collection Time: 09/14/21  2:46 PM   Result Value Ref Range    NT pro-BNP 42 <125 PG/ML   HGB & HCT    Collection Time: 09/14/21  7:01 PM   Result Value Ref Range    HGB 11.7 11.5 - 16.0 g/dL    HCT 36.9 35.0 - 47.0 %   D DIMER    Collection Time: 09/14/21  7:01 PM   Result Value Ref Range    D-dimer 2.93 (H) 0.00 - 0.65 mg/L FEU   COVID-19 RAPID TEST    Collection Time: 09/14/21  7:01 PM   Result Value Ref Range    Specimen source Nasopharyngeal      COVID-19 rapid test Detected (AA) NOTD         Radiologic Studies -   I have personally reviewed and interpreted all available imaging studies and agree with radiology interpretation and report. US RETROPERITONEUM COMP   Final Result   Nonspecific complex right adrenal mass.  The area that demonstrates   increased echogenicity on the ultrasound is relatively hypodense on CT and may represent presence of fat. 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 Consider MRI   for characterization. CTA CHEST W OR W WO CONT   Final Result      1. No evidence of pulmonary embolus. 2.  Scattered peripheral ground glass opacities with spiculation likely   reflecting Covid 19 pneumonia possibly residual         CT ABD PELV W CONT   Final Result   1. Complex right adrenal lesion measuring 4.3 cm. There is internal hypodensity   within the lesion which may reflect presence of blood products or previous   hemorrhage. 2.  Other incidental findings as above. XR CHEST PA LAT   Final Result   New mild peripheral parenchymal opacities could be due to developing Covid   pneumonia. CT Results  (Last 48 hours)               09/14/21 1658  CTA CHEST W OR W WO CONT Final result    Impression:      1. No evidence of pulmonary embolus. 2.  Scattered peripheral ground glass opacities with spiculation likely   reflecting Covid 19 pneumonia possibly residual           Narrative:  EXAM:  CTA CHEST W OR W WO CONT       INDICATION: Acute chest pain. Recent Covid infection       COMPARISON: None. TECHNIQUE: Helical thin section chest CT following uneventful intravenous   administration of nonionic contrast 100 mL of isovue 370 according to   departmental PE protocol. Coronal and sagittal reformats were performed. 3D post   processing was performed. CT dose reduction was achieved through the use of a   standardized protocol tailored for this examination and automatic exposure   control for dose modulation. FINDINGS: This is a good quality study for the evaluation of pulmonary embolism   to the first subsegmental arterial level. There is no pulmonary embolism to this   level. THYROID: No nodule. MEDIASTINUM: No mass or lymphadenopathy. ANICETO: No mass or lymphadenopathy. THORACIC AORTA: No aneurysm. HEART: Normal in size.    ESOPHAGUS: Small hiatal hernia TRACHEA/BRONCHI: Patent. PLEURA: No effusion or pneumothorax. LUNGS: Scattered peripheral groundglass opacities with spiculation and scattered   areas of scarring. UPPER ABDOMEN: Please see separate abdominal CT   BONES: No aggressive bone lesion or fracture. 09/14/21 1658  CT ABD PELV W CONT Final result    Impression:  1. Complex right adrenal lesion measuring 4.3 cm. There is internal hypodensity   within the lesion which may reflect presence of blood products or previous   hemorrhage. 2.  Other incidental findings as above. Narrative:  EXAM: CT ABD PELV W CONT       INDICATION: acute abd pain       COMPARISON: No comparisons        CONTRAST: 100 mL of Isovue-370. TECHNIQUE:    Following the uneventful intravenous administration of contrast, thin axial   images were obtained through the abdomen and pelvis. Coronal and sagittal   reconstructions were generated. Oral contrast was not administered. CT dose   reduction was achieved through use of a standardized protocol tailored for this   examination and automatic exposure control for dose modulation. FINDINGS:    LOWER THORAX: See separate chest CT   LIVER: Simple hepatic cysts in the right hepatic lobe   BILIARY TREE: Gallbladder is within normal limits. CBD is not dilated. SPLEEN: within normal limits. PANCREAS: No mass or ductal dilatation. ADRENALS: Complex lesion in the right adrenal gland with relatively low density   with small foci of hypodensity centrally. This measures approximately 4.3 x 3.2   cm. The left adrenal gland is unremarkable   KIDNEYS: No mass, calculus, or hydronephrosis. STOMACH: Small hiatal hernia. SMALL BOWEL: No small bowel dilatation   COLON: No dilatation or wall thickening. APPENDIX: Unremarkable   PERITONEUM: No ascites or pneumoperitoneum. RETROPERITONEUM: No lymphadenopathy or aortic aneurysm. REPRODUCTIVE ORGANS: Unremarkable   URINARY BLADDER: No mass or calculus. BONES: No destructive bone lesion. ABDOMINAL WALL: No mass or hernia. ADDITIONAL COMMENTS: N/A               CXR Results  (Last 48 hours)               09/14/21 1519  XR CHEST PA LAT Final result    Impression:  New mild peripheral parenchymal opacities could be due to developing Covid   pneumonia. Narrative:  Exam:  2 view chest       Indication: Shortness of breath. COMPARISON: 9/7/2021       PA and lateral views demonstrate normal heart size. There are new mild peripheral parenchymal opacities in both lower lobes right   greater than left. This is worrisome for developing pneumonia and could be Covid   pneumonia. No pleural effusions. No adenopathy. Medical Decision Making   I reviewed the vital signs, available nursing notes, past medical history, past surgical history, family history and social history. Vital Signs-Reviewed the patient's vital signs. Patient Vitals for the past 24 hrs:   Temp Pulse Resp BP SpO2   09/15/21 0200  (!) 57 14 (!) 103/55 93 %   09/15/21 0100  (!) 55 13 106/71 92 %   09/14/21 2300  (!) 57 15 112/66 95 %   09/14/21 2230  (!) 54 16 119/66 95 %   09/14/21 2200  (!) 57 17  94 %   09/14/21 2100  60 18  98 %   09/14/21 2030  (!) 55 13 119/69 96 %   09/14/21 2000  (!) 54 19 109/70 94 %   09/14/21 1921 98.7 °F (37.1 °C) 69 18 (!) 163/98 99 %   09/14/21 1417 98.8 °F (37.1 °C) 77 28 (!) 141/84 99 %       Pulse Oximetry Analysis: 99% on RA    Cardiac Monitor:   Rate: 77 bpm  The cardiac monitor revealed the following rhythm as interpreted by me: Normal Sinus Rhythm  Cardiac monitoring was ordered to monitor patient for signs of cardiac dysrhythmia, which they are at risk for based on their history and/or risk for cardiovascular disease and/or metabolic abnormalities.      Records Reviewed: Nursing Notes, Old Medical Records, Previous electrocardiograms, Previous Radiology Studies and Previous Laboratory Studies    Provider Notes (Medical Decision Making):   DDx includes STEMI, NSTEMI, Angina, PE, Aortic Pathology, Chest Wall Pain, Pleurisy, Pneumonia, GERD/esophagitis, Anxiety. No cough/fever or focal lung findings to suggest pneumonia. No tachycardia, hypoxia or pleuritic component to suggest PE. Pulses symmetric and no extremely elevated BP/asymmetry or classic tearing sensation to suggest Aortic Dissection. Also, no neuro findings. No wretching/forceful vomiting to suggest esophageal disaster. Denies IV drug abuse, has native valves, no fevers/murmurs or skin lesions to suggest endocarditis. Will evaluate with EKG, labs, cardiac enzymes, chest x-ray. Will provide pain control and reassess. Pt presents with acute abdominal pain; vital signs stable with currently a non-peritoneal exam; DDx includes: Gastroenteritis, hepatitis, pancreatitis, obstruction, appendicitis, viral illness, IBD, diverticulitis, mesenteric ischemia, AAA or descending dissection, ACS, kidney stone. Will get labs, treat symptomatically and obtain serial abdominal exams to determine if additional imaging is indicated. Will reassess and monitor closely. ED Course:   I am the first physician for this patient's ED visit today. Initial assessment performed. I discussed presenting problems, concerns and my formulated plan for today's visit with the patient and any available family members at bedside. I encouraged them to ask questions as they arise throughout the visit. Social History     Tobacco Use    Smoking status: Never Smoker    Smokeless tobacco: Never Used   Substance Use Topics    Alcohol use: Not on file    Drug use: Not Currently       I reviewed our electronic medical record system for any past medical records that were available that may contribute to the patient's current condition, the nursing notes and vital signs from today's visit.       ED Orders Placed :  Orders Placed This Encounter    ANTICOAGULANT THERAPY IS CONTRAINDICATED Patient is at risk of or experiencing active bleeding    COVID-19 RAPID TEST    XR CHEST PA LAT    CTA CHEST W OR W WO CONT    CT ABD PELV W CONT    US RETROPERITONEUM COMP    CBC WITH AUTOMATED DIFF    METABOLIC PANEL, COMPREHENSIVE    CK W/ REFLX CKMB    TROPONIN I    NT-PRO BNP    HGB & HCT    D DIMER    METABOLIC PANEL, BASIC    MAGNESIUM    C REACTIVE PROTEIN, QT    D DIMER    ADULT DIET Regular; Low Fat/Low Chol/High Fiber/2 gm Na    SOB PANEL TRACKING (DO NOT DESELECT)    OXYGEN CANNULA (To maintain O2 sat greater than 92%) Consult MD if Hx. of COPD    PULSE OXIMETRY SPOT CHECK    NOTIFY PROVIDER: SPECIFY Notify provider on pt's arrival to floor ONE TIME STAT    VITAL SIGNS PER UNIT ROUTINE    INTAKE AND OUTPUT    OUT OF BED WITH ASSISTANCE    VITAL SIGNS    UP AD BREEZY    APPLY/MAINTAIN SEQUENTIAL COMPRESSION DEVICE    FULL CODE    IP CONSULT TO HEMATOLOGY    DROPLET PLUS    EKG 12 LEAD INITIAL    SALINE LOCK IV ONE TIME STAT    ketorolac (TORADOL) injection 30 mg    iopamidoL (ISOVUE-370) 76 % injection 100 mL    morphine injection 4 mg    DISCONTD: morphine injection 4 mg    DISCONTD: ondansetron (ZOFRAN) injection 4 mg    DISCONTD: acetaminophen (TYLENOL) tablet 650 mg    morphine injection 1 mg    amLODIPine (NORVASC) tablet 10 mg    atorvastatin (LIPITOR) tablet 80 mg    butalbital-acetaminophen-caffeine (FIORICET, ESGIC) -40 mg per tablet 1 Tablet    cyclobenzaprine (FLEXERIL) tablet 10 mg    escitalopram oxalate (LEXAPRO) tablet 10 mg    levothyroxine (SYNTHROID) tablet 300 mcg    potassium chloride SR (KLOR-CON 10) tablet 10 mEq    potassium chloride SR (KLOR-CON 10) tablet 20 mEq    sodium chloride (NS) flush 5-40 mL    sodium chloride (NS) flush 5-40 mL    OR Linked Order Group     acetaminophen (TYLENOL) tablet 650 mg     acetaminophen (TYLENOL) suppository 650 mg    polyethylene glycol (MIRALAX) packet 17 g    OR Linked Order Group     ondansetron (ZOFRAN ODT) tablet 4 mg     ondansetron (ZOFRAN) injection 4 mg    oxyCODONE-acetaminophen (PERCOCET) 5-325 mg per tablet 1 Tablet    ascorbic acid (vitamin C) (VITAMIN C) tablet 1,000 mg    zinc sulfate (ZINCATE) 50 mg zinc (220 mg) capsule 1 Capsule    guaiFENesin ER (MUCINEX) tablet 600 mg    albuterol (PROVENTIL HFA, VENTOLIN HFA, PROAIR HFA) inhaler 2 Puff    0.9% sodium chloride infusion    propranoloL (INDERAL) 20 mg tablet    furosemide (LASIX) 20 mg tablet    fluticasone propionate (FLONASE) 50 mcg/actuation nasal spray    fluticasone propion-salmeteroL (ADVAIR/WIXELA) 100-50 mcg/dose diskus inhaler    chlorthalidone (HYGROTON) 25 mg tablet    propranoloL (INDERAL) tablet 20 mg    furosemide (LASIX) tablet 20 mg    fluticasone propionate (FLONASE) 50 mcg/actuation nasal spray 2 Spray    INITIAL PHYSICIAN ORDER: INPATIENT Medical; No; 3.  Patient receiving treatment that can only be provided in an inpatient setting (further clarification in H&P documentation)       ED Medications Administered:  Medications   morphine injection 1 mg (has no administration in time range)   amLODIPine (NORVASC) tablet 10 mg (has no administration in time range)   atorvastatin (LIPITOR) tablet 80 mg (80 mg Oral Given 9/14/21 2332)   butalbital-acetaminophen-caffeine (FIORICET, ESGIC) -40 mg per tablet 1 Tablet (has no administration in time range)   cyclobenzaprine (FLEXERIL) tablet 10 mg (has no administration in time range)   escitalopram oxalate (LEXAPRO) tablet 10 mg (has no administration in time range)   levothyroxine (SYNTHROID) tablet 300 mcg (has no administration in time range)   potassium chloride SR (KLOR-CON 10) tablet 10 mEq (has no administration in time range)   sodium chloride (NS) flush 5-40 mL (10 mL IntraVENous Given 9/14/21 2332)   sodium chloride (NS) flush 5-40 mL (has no administration in time range)   acetaminophen (TYLENOL) tablet 650 mg (has no administration in time range)     Or acetaminophen (TYLENOL) suppository 650 mg (has no administration in time range)   polyethylene glycol (MIRALAX) packet 17 g (has no administration in time range)   ondansetron (ZOFRAN ODT) tablet 4 mg (has no administration in time range)     Or   ondansetron (ZOFRAN) injection 4 mg (has no administration in time range)   oxyCODONE-acetaminophen (PERCOCET) 5-325 mg per tablet 1 Tablet (1 Tablet Oral Given 9/14/21 2344)   ascorbic acid (vitamin C) (VITAMIN C) tablet 1,000 mg (has no administration in time range)   zinc sulfate (ZINCATE) 50 mg zinc (220 mg) capsule 1 Capsule (has no administration in time range)   guaiFENesin ER (MUCINEX) tablet 600 mg (600 mg Oral Given 9/14/21 2043)   albuterol (PROVENTIL HFA, VENTOLIN HFA, PROAIR HFA) inhaler 2 Puff (2 Puffs Inhalation Given 9/15/21 0204)   0.9% sodium chloride infusion (75 mL/hr IntraVENous New Bag 9/14/21 2041)   propranoloL (INDERAL) tablet 20 mg (has no administration in time range)   furosemide (LASIX) tablet 20 mg (has no administration in time range)   fluticasone propionate (FLONASE) 50 mcg/actuation nasal spray 2 Spray (has no administration in time range)   ketorolac (TORADOL) injection 30 mg (30 mg IntraVENous Given 9/14/21 1610)   iopamidoL (ISOVUE-370) 76 % injection 100 mL (100 mL IntraVENous Given 9/14/21 1658)   morphine injection 4 mg (4 mg IntraVENous Given 9/14/21 1916)   potassium chloride SR (KLOR-CON 10) tablet 20 mEq (20 mEq Oral Given 9/14/21 2043)        Consult Note:  Jame Mcdonough MD spoke with Dr. Delmer Szymanski  Specialty: Hospitalist  Discussed pt's hx, disposition, and available diagnostic and imaging results. Reviewed care plans. Agree with management and plan thus far. Consultant will evaluate pt.     CRITICAL CARE NOTE :  IMPENDING DETERIORATION -Cardiovascular, Metabolic and Renal  ASSOCIATED RISK FACTORS - Hypotension, Shock, Hypoxia, Bleeding, Dysrhythmia, Dehydration and Vascular Compromise  MANAGEMENT- Bedside Assessment and Supervision of Care  INTERPRETATION -  Xrays, CT Scan, ECG, Blood Pressure and Cardiac Output Measures   INTERVENTIONS - hemodynamic mngmt and Metobolic interventions  CASE REVIEW - Hospitalist/Intensivist, Nursing and Family  TREATMENT RESPONSE -Improved  PERFORMED BY - Self    NOTES   :  I personally spent 55 minutes of critical care time with this patient. This is time spent at this critically ill patient's bedside actively involved in patient care as well as the coordination of care and discussions with the patient's family. This includes time involved in lab review, consultations with specialist, family decision-making, bedside attention and documentation. During this entire length of time I was immediately available to the patient. This does not include time spent on separately reported billable procedures. Critical Care: The reason for providing this level of medical care for this critically-ill patient was due to a critical illness that impaired one or more vital organ systems, such that there was a high probability of imminent or life-threatening deterioration in the patient's condition. This care involved the highest level of preparedness to intervene urgently. This care involved high complexity decision making to assess, manipulate, and support vital system functions, to treat this degree of vital organ system failure, and to prevent further life threatening deterioration of the patients condition requiring frequent assessments and interventions. Ray Ace MD    Disposition:   ADMIT  Given the patient's current clinical presentation, I have a high level of concern for decompensation if discharged from the emergency department. Patient is being admitted to the hospital.  The results of their tests and reasons for their admission have been discussed with them and/or available family. They convey agreement and understanding for the need to be admitted and for their admission diagnosis. Consultation has been made with the inpatient physician specialist for hospitalization. Diagnosis   Clinical Impression:  1. Abdominal wall pain in right flank    2. Pneumonia due to COVID-19 virus    3. Adrenal hemorrhage (HCC)        Attestation:  Nikolas Ngo MD, am the attending of record for this patient. I personally performed the services described in this documentation on this date, 9/14/2021 for patient, Etta Pate. I have reviewed the chart and verified that the record is accurate and complete.

## 2021-09-14 NOTE — ED NOTES
Patient was Covid + 8/27. She was given some medicine but she feels like her SOB is worse. Her sats are 98% in triage.   Pt is experiencing midepigastric pain worse on inspiration

## 2021-09-14 NOTE — ED NOTES
Pt also complains of pain in her RLQ since yesterday. Chest pain started last night. 

## 2021-09-15 ENCOUNTER — APPOINTMENT (OUTPATIENT)
Dept: MRI IMAGING | Age: 46
DRG: 643 | End: 2021-09-15
Attending: STUDENT IN AN ORGANIZED HEALTH CARE EDUCATION/TRAINING PROGRAM
Payer: COMMERCIAL

## 2021-09-15 LAB
ANION GAP SERPL CALC-SCNC: 2 MMOL/L (ref 5–15)
BUN SERPL-MCNC: 12 MG/DL (ref 6–20)
BUN/CREAT SERPL: 18 (ref 12–20)
CALCIUM SERPL-MCNC: 8.4 MG/DL (ref 8.5–10.1)
CHLORIDE SERPL-SCNC: 104 MMOL/L (ref 97–108)
CO2 SERPL-SCNC: 32 MMOL/L (ref 21–32)
CREAT SERPL-MCNC: 0.68 MG/DL (ref 0.55–1.02)
CRP SERPL-MCNC: 1.02 MG/DL (ref 0–0.6)
D DIMER PPP FEU-MCNC: 2.02 MG/L FEU (ref 0–0.65)
ERYTHROCYTE [DISTWIDTH] IN BLOOD BY AUTOMATED COUNT: 14.6 % (ref 11.5–14.5)
GLUCOSE SERPL-MCNC: 99 MG/DL (ref 65–100)
HCT VFR BLD AUTO: 38.2 % (ref 35–47)
HGB BLD-MCNC: 12.1 G/DL (ref 11.5–16)
MAGNESIUM SERPL-MCNC: 2.3 MG/DL (ref 1.6–2.4)
MCH RBC QN AUTO: 31.1 PG (ref 26–34)
MCHC RBC AUTO-ENTMCNC: 31.7 G/DL (ref 30–36.5)
MCV RBC AUTO: 98.2 FL (ref 80–99)
NRBC # BLD: 0 K/UL (ref 0–0.01)
NRBC BLD-RTO: 0 PER 100 WBC
PLATELET # BLD AUTO: 434 K/UL (ref 150–400)
PMV BLD AUTO: 8.5 FL (ref 8.9–12.9)
POTASSIUM SERPL-SCNC: 3.3 MMOL/L (ref 3.5–5.1)
RBC # BLD AUTO: 3.89 M/UL (ref 3.8–5.2)
SODIUM SERPL-SCNC: 138 MMOL/L (ref 136–145)
WBC # BLD AUTO: 12.7 K/UL (ref 3.6–11)

## 2021-09-15 PROCEDURE — 74011250637 HC RX REV CODE- 250/637: Performed by: STUDENT IN AN ORGANIZED HEALTH CARE EDUCATION/TRAINING PROGRAM

## 2021-09-15 PROCEDURE — 74183 MRI ABD W/O CNTR FLWD CNTR: CPT

## 2021-09-15 PROCEDURE — 74011250636 HC RX REV CODE- 250/636: Performed by: INTERNAL MEDICINE

## 2021-09-15 PROCEDURE — 86140 C-REACTIVE PROTEIN: CPT

## 2021-09-15 PROCEDURE — 85379 FIBRIN DEGRADATION QUANT: CPT

## 2021-09-15 PROCEDURE — 83735 ASSAY OF MAGNESIUM: CPT

## 2021-09-15 PROCEDURE — 65270000029 HC RM PRIVATE

## 2021-09-15 PROCEDURE — 85027 COMPLETE CBC AUTOMATED: CPT

## 2021-09-15 PROCEDURE — 74011250637 HC RX REV CODE- 250/637: Performed by: INTERNAL MEDICINE

## 2021-09-15 PROCEDURE — 80048 BASIC METABOLIC PNL TOTAL CA: CPT

## 2021-09-15 PROCEDURE — A9585 GADOBUTROL INJECTION: HCPCS | Performed by: INTERNAL MEDICINE

## 2021-09-15 PROCEDURE — 94640 AIRWAY INHALATION TREATMENT: CPT

## 2021-09-15 PROCEDURE — 36415 COLL VENOUS BLD VENIPUNCTURE: CPT

## 2021-09-15 RX ORDER — POTASSIUM CHLORIDE 20 MEQ/1
40 TABLET, EXTENDED RELEASE ORAL
Status: COMPLETED | OUTPATIENT
Start: 2021-09-15 | End: 2021-09-15

## 2021-09-15 RX ADMIN — ZINC SULFATE 220 MG (50 MG) CAPSULE 1 CAPSULE: CAPSULE at 08:16

## 2021-09-15 RX ADMIN — ONDANSETRON 4 MG: 2 INJECTION INTRAMUSCULAR; INTRAVENOUS at 09:01

## 2021-09-15 RX ADMIN — ALBUTEROL SULFATE 2 PUFF: 90 AEROSOL, METERED RESPIRATORY (INHALATION) at 23:21

## 2021-09-15 RX ADMIN — AMLODIPINE BESYLATE 10 MG: 5 TABLET ORAL at 08:15

## 2021-09-15 RX ADMIN — GADOBUTROL 10 ML: 604.72 INJECTION INTRAVENOUS at 21:48

## 2021-09-15 RX ADMIN — MORPHINE SULFATE 1 MG: 2 INJECTION, SOLUTION INTRAMUSCULAR; INTRAVENOUS at 08:00

## 2021-09-15 RX ADMIN — ALBUTEROL SULFATE 2 PUFF: 90 AEROSOL, METERED RESPIRATORY (INHALATION) at 07:54

## 2021-09-15 RX ADMIN — PROPRANOLOL HYDROCHLORIDE 20 MG: 10 TABLET ORAL at 09:19

## 2021-09-15 RX ADMIN — SODIUM CHLORIDE 75 ML/HR: 9 INJECTION, SOLUTION INTRAVENOUS at 07:54

## 2021-09-15 RX ADMIN — Medication 10 ML: at 14:00

## 2021-09-15 RX ADMIN — POTASSIUM CHLORIDE 10 MEQ: 750 TABLET, FILM COATED, EXTENDED RELEASE ORAL at 08:16

## 2021-09-15 RX ADMIN — FUROSEMIDE 20 MG: 40 TABLET ORAL at 08:15

## 2021-09-15 RX ADMIN — GUAIFENESIN 600 MG: 600 TABLET, EXTENDED RELEASE ORAL at 08:15

## 2021-09-15 RX ADMIN — ALBUTEROL SULFATE 2 PUFF: 90 AEROSOL, METERED RESPIRATORY (INHALATION) at 02:04

## 2021-09-15 RX ADMIN — ESCITALOPRAM OXALATE 10 MG: 10 TABLET ORAL at 17:16

## 2021-09-15 RX ADMIN — POTASSIUM CHLORIDE 40 MEQ: 20 TABLET, EXTENDED RELEASE ORAL at 11:16

## 2021-09-15 RX ADMIN — OXYCODONE HYDROCHLORIDE AND ACETAMINOPHEN 1000 MG: 500 TABLET ORAL at 08:15

## 2021-09-15 RX ADMIN — GUAIFENESIN 600 MG: 600 TABLET, EXTENDED RELEASE ORAL at 22:13

## 2021-09-15 RX ADMIN — ALBUTEROL SULFATE 2 PUFF: 90 AEROSOL, METERED RESPIRATORY (INHALATION) at 13:33

## 2021-09-15 RX ADMIN — MORPHINE SULFATE 1 MG: 2 INJECTION, SOLUTION INTRAMUSCULAR; INTRAVENOUS at 17:16

## 2021-09-15 RX ADMIN — PROPRANOLOL HYDROCHLORIDE 20 MG: 10 TABLET ORAL at 18:38

## 2021-09-15 RX ADMIN — ATORVASTATIN CALCIUM 80 MG: 40 TABLET, FILM COATED ORAL at 22:13

## 2021-09-15 RX ADMIN — FLUTICASONE PROPIONATE 2 SPRAY: 50 SPRAY, METERED NASAL at 09:19

## 2021-09-15 RX ADMIN — OXYCODONE HYDROCHLORIDE AND ACETAMINOPHEN 1 TABLET: 5; 325 TABLET ORAL at 11:16

## 2021-09-15 RX ADMIN — LEVOTHYROXINE SODIUM 300 MCG: 0.15 TABLET ORAL at 07:54

## 2021-09-15 RX ADMIN — Medication 10 ML: at 22:13

## 2021-09-15 NOTE — ED NOTES
TRANSFER - OUT REPORT:    Verbal report given to Kris Hadley RN(name) on Ally Momin  being transferred to [address] [facility] (unit) for routine progression of care       Report consisted of patients Situation, Background, Assessment and   Recommendations(SBAR). Information from the following report(s) SBAR, ED Summary, [facility] and Recent Results was reviewed with the receiving nurse. Lines:   Peripheral IV 09/14/21 Left Antecubital (Active)       Peripheral IV 26/44/02 Right Cephalic (Active)   Site Assessment Clean, dry, & intact 09/15/21 1140   Dressing Status Clean, dry, & intact 09/15/21 1140   Dressing Type Transparent 09/15/21 1140   Hub Color/Line Status Green;Flushed;Patent 09/15/21 1140   Action Taken Blood drawn 09/15/21 1140        Opportunity for questions and clarification was provided.

## 2021-09-15 NOTE — PROGRESS NOTES
Pt resting in bed, call bell within reach, SCD's applied, provided water canister to pt. Patient reported no further needs, lights cut off for pt comfort.

## 2021-09-15 NOTE — PROGRESS NOTES
TRANSFER - IN REPORT:    Verbal report received from [facility] (name) on Dara Lovelace  being received from Emergency Department (unit) for routine progression of care      Report consisted of patients Situation, Background, Assessment and   Recommendations(SBAR). Information from the following report(s) SBAR, Kardex, ED Summary, MAR, Med Rec Status and Cardiac Rhythm NSR was reviewed with the receiving nurse. Opportunity for questions and clarification was provided. Assessment completed upon patients arrival to unit and care assumed. room air

## 2021-09-15 NOTE — CONSULTS
Hematology Oncology Consultation      Reason for consult: adrenal hemorrhage    Admitting Diagnosis: Adrenal hemorrhage ([facility] [address] [E27.49]  Abdominal wall pain in right flank [R10.9]  Pneumonia due to COVID-19 virus [U07.1, J12.82]    Discussion: Jalyn Andres is a  48y.o. year old seen in consultation at the request of Dr. Alfred Cochran for adrenal hemorrhage. She has a h/o asthma, TIA, HTN, and recent covid19 vaccine 8/27 and covid19 dx 8/27. She got her first covid19 vaccine on 8/26 and then tested positive for covid on 8/27. She was managed at home but presented today for worsening shortness of breath, cough, and RUQ pain. RUQ pain started 2 days ago, is constant but varies in severity 4-8/10, non-radiating, sharp/squeezing. Labs show WBC 12.7, hb normal, plt 434. D-dimer 2.02. COVID19 detected. On 3/31/21 cardiolipin Ab neg, lupus anticoagulant neg, B2GP negative,  negative for prothrombin gene mutation, FVL negative. She is not sure why those were checked. No h/o bleeding. Has had many surgeries previously and no bleeding problems. No h/o clotting aside from possible superficial thrombophlebitis that was not confirmed with US. She does have a h/o TIA and has been on ASA in the past, none recently. No blood thinners. No recent trauma. Imaging:  US RP 9/14/21:   Nonspecific complex right adrenal mass. The area that demonstrates  increased echogenicity on the ultrasound is relatively hypodense on CT and may  represent presence of fat. 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 Consider MRI  for characterization. CT A/P 9/14/21: I personally reviewed these images for personal interpretation  1. Complex right adrenal lesion measuring 4.3 cm. There is internal hypodensity  within the lesion which may reflect presence of blood products or previous  Hemorrhage. 2.  Other incidental findings as above. CT Chest 9/14/21:   1. No evidence of pulmonary embolus.   2.  Scattered peripheral ground glass opacities with spiculation likely  reflecting Covid 19 pneumonia possibly residual      Labs:  Recent Results (from the past 24 hour(s))   EKG, 12 LEAD, INITIAL    Collection Time: 09/14/21  2:20 PM   Result Value Ref Range    Ventricular Rate 72 BPM    Atrial Rate 72 BPM    P-R Interval 154 ms    QRS Duration 80 ms    Q-T Interval 386 ms    QTC Calculation (Bezet) 422 ms    Calculated P Axis 44 degrees    Calculated R Axis 11 degrees    Calculated T Axis 36 degrees    Diagnosis       Normal sinus rhythm  When compared with ECG of 07-SEP-2021 10:29,  No significant change was found  Confirmed by Aris El ([id number]) on 9/14/2021 4:55:59 PM     CBC WITH AUTOMATED DIFF    Collection Time: 09/14/21  2:46 PM   Result Value Ref Range    WBC 9.8 3.6 - 11.0 K/uL    RBC 3.80 3.80 - 5.20 M/uL    HGB 11.8 11.5 - 16.0 g/dL    HCT 37.2 35.0 - 47.0 %    MCV 97.9 80.0 - 99.0 FL    MCH 31.1 26.0 - 34.0 PG    MCHC 31.7 30.0 - 36.5 g/dL    RDW 14.5 11.5 - 14.5 %    PLATELET 550 (H) 376 - 400 K/uL    MPV 8.7 (L) 8.9 - 12.9 FL    NRBC 0.2 (H) 0  WBC    ABSOLUTE NRBC 0.02 (H) 0.00 - 0.01 K/uL    NEUTROPHILS 66 32 - 75 %    LYMPHOCYTES 22 12 - 49 %    MONOCYTES 8 5 - 13 %    EOSINOPHILS 1 0 - 7 %    BASOPHILS 1 0 - 1 %    IMMATURE GRANULOCYTES 2 (H) 0.0 - 0.5 %    ABS. NEUTROPHILS 6.6 1.8 - 8.0 K/UL    ABS. LYMPHOCYTES 2.2 0.8 - 3.5 K/UL    ABS. MONOCYTES 0.7 0.0 - 1.0 K/UL    ABS. EOSINOPHILS 0.1 0.0 - 0.4 K/UL    ABS. BASOPHILS 0.1 0.0 - 0.1 K/UL    ABS. IMM.  GRANS. 0.2 (H) 0.00 - 0.04 K/UL    DF AUTOMATED     METABOLIC PANEL, COMPREHENSIVE    Collection Time: 09/14/21  2:46 PM   Result Value Ref Range    Sodium 141 136 - 145 mmol/L    Potassium 3.4 (L) 3.5 - 5.1 mmol/L    Chloride 105 97 - 108 mmol/L    CO2 33 (H) 21 - 32 mmol/L    Anion gap 3 (L) 5 - 15 mmol/L    Glucose 95 65 - 100 mg/dL    BUN 11 6 - 20 MG/DL    Creatinine 0.76 0.55 - 1.02 MG/DL    BUN/Creatinine ratio 14 12 - 20      GFR est AA >60 >60 ml/min/1.73m2 GFR est non-AA >60 >60 ml/min/1.73m2    Calcium 8.3 (L) 8.5 - 10.1 MG/DL    Bilirubin, total 0.5 0.2 - 1.0 MG/DL    ALT (SGPT) 38 12 - 78 U/L    AST (SGOT) 13 (L) 15 - 37 U/L    Alk.  phosphatase 62 45 - 117 U/L    Protein, total 6.5 6.4 - 8.2 g/dL    Albumin 3.0 (L) 3.5 - 5.0 g/dL    Globulin 3.5 2.0 - 4.0 g/dL    A-G Ratio 0.9 (L) 1.1 - 2.2     CK W/ REFLX CKMB    Collection Time: 09/14/21  2:46 PM   Result Value Ref Range    CK 37 26 - 192 U/L   TROPONIN I    Collection Time: 09/14/21  2:46 PM   Result Value Ref Range    Troponin-I, Qt. <0.05 <0.05 ng/mL   NT-PRO BNP    Collection Time: 09/14/21  2:46 PM   Result Value Ref Range    NT pro-BNP 42 <125 PG/ML   HGB & HCT    Collection Time: 09/14/21  7:01 PM   Result Value Ref Range    HGB 11.7 11.5 - 16.0 g/dL    HCT 36.9 35.0 - 47.0 %   D DIMER    Collection Time: 09/14/21  7:01 PM   Result Value Ref Range    D-dimer 2.93 (H) 0.00 - 0.65 mg/L FEU   COVID-19 RAPID TEST    Collection Time: 09/14/21  7:01 PM   Result Value Ref Range    Specimen source Nasopharyngeal      COVID-19 rapid test Detected (AA) NOTD     METABOLIC PANEL, BASIC    Collection Time: 09/15/21  4:51 AM   Result Value Ref Range    Sodium 138 136 - 145 mmol/L    Potassium 3.3 (L) 3.5 - 5.1 mmol/L    Chloride 104 97 - 108 mmol/L    CO2 32 21 - 32 mmol/L    Anion gap 2 (L) 5 - 15 mmol/L    Glucose 99 65 - 100 mg/dL    BUN 12 6 - 20 MG/DL    Creatinine 0.68 0.55 - 1.02 MG/DL    BUN/Creatinine ratio 18 12 - 20      GFR est AA >60 >60 ml/min/1.73m2    GFR est non-AA >60 >60 ml/min/1.73m2    Calcium 8.4 (L) 8.5 - 10.1 MG/DL   MAGNESIUM    Collection Time: 09/15/21  4:51 AM   Result Value Ref Range    Magnesium 2.3 1.6 - 2.4 mg/dL   C REACTIVE PROTEIN, QT    Collection Time: 09/15/21  4:51 AM   Result Value Ref Range    C-Reactive protein 1.02 (H) 0.00 - 0.60 mg/dL   D DIMER    Collection Time: 09/15/21  4:51 AM   Result Value Ref Range    D-dimer 2.02 (H) 0.00 - 0.65 mg/L FEU   CBC W/O DIFF    Collection Time: 09/15/21 11:39 AM   Result Value Ref Range    WBC 12.7 (H) 3.6 - 11.0 K/uL    RBC 3.89 3.80 - 5.20 M/uL    HGB 12.1 11.5 - 16.0 g/dL    HCT 38.2 35.0 - 47.0 %    MCV 98.2 80.0 - 99.0 FL    MCH 31.1 26.0 - 34.0 PG    MCHC 31.7 30.0 - 36.5 g/dL    RDW 14.6 (H) 11.5 - 14.5 %    PLATELET 242 (H) 948 - 400 K/uL    MPV 8.5 (L) 8.9 - 12.9 FL    NRBC 0.0 0  WBC    ABSOLUTE NRBC 0.00 0.00 - 0.01 K/uL       History:  Past Medical History:   Diagnosis Date    HTN (hypertension)       Past Surgical History:   Procedure Laterality Date    HX OTHER SURGICAL      Right shoulder      Prior to Admission medications    Medication Sig Start Date End Date Taking? Authorizing Provider   propranoloL (INDERAL) 20 mg tablet Take 20 mg by mouth two (2) times a day. 8/6/21   Provider, Historical   furosemide (LASIX) 20 mg tablet Take 20 mg by mouth daily. 8/6/21   Provider, Historical   fluticasone propionate (FLONASE) 50 mcg/actuation nasal spray USE 2 SPRAY(S) IN EACH NOSTRIL ONCE DAILY 9/3/21   Provider, Historical   fluticasone propion-salmeteroL (ADVAIR/WIXELA) 100-50 mcg/dose diskus inhaler  8/24/21   Provider, Historical   chlorthalidone (HYGROTON) 25 mg tablet Take 25 mg by mouth daily. 8/6/21   Provider, Historical   potassium chloride SR (KLOR-CON 10) 10 mEq tablet Take 1 Tablet by mouth daily. 9/7/21   Diane Morrison MD   amLODIPine (NORVASC) 10 mg tablet Take 1 Tab by mouth daily. 11/9/20   Aruna Gaming MD   aspirin 81 mg chewable tablet Take 1 Tab by mouth daily. 11/9/20   Aruna Gaming MD   atorvastatin (LIPITOR) 80 mg tablet Take 1 Tab by mouth nightly. 11/8/20   Aruna Gaming MD   butalbital-acetaminophen-caffeine (FIORICET, ESGIC) -40 mg per tablet Take 1 Tab by mouth every six (6) hours as needed for Headache or Migraine. 11/8/20   Aruna Gaming MD   escitalopram oxalate (LEXAPRO) 10 mg tablet Take 1 Tab by mouth daily (with dinner).  11/8/20   Aruna Gaming MD   levothyroxine (SYNTHROID) 300 mcg tablet Take  by mouth Daily (before breakfast). Provider, Maryan   naproxen (NAPROSYN) 500 mg tablet Take 1 Tab by mouth every twelve (12) hours as needed for Pain. 8/27/20   Lauren Tomlinson MD   cyclobenzaprine (FLEXERIL) 10 mg tablet Take 1 Tab by mouth three (3) times daily as needed for Muscle Spasm(s). 8/27/20   Lauren Tomlinson MD     Allergies   Allergen Reactions    Influenza Virus Vaccines Anaphylaxis    Shellfish Derived Anaphylaxis    Betadine [Povidone-Iodine] Hives      Social History     Tobacco Use    Smoking status: Never Smoker    Smokeless tobacco: Never Used   Substance Use Topics    Alcohol use: Not on file      No family history on file.      Current Medications:  Current Facility-Administered Medications   Medication Dose Route Frequency    morphine injection 1 mg  1 mg IntraVENous Q4H PRN    amLODIPine (NORVASC) tablet 10 mg  10 mg Oral DAILY    atorvastatin (LIPITOR) tablet 80 mg  80 mg Oral QHS    butalbital-acetaminophen-caffeine (FIORICET, ESGIC) -40 mg per tablet 1 Tablet  1 Tablet Oral Q6H PRN    cyclobenzaprine (FLEXERIL) tablet 10 mg  10 mg Oral TID PRN    escitalopram oxalate (LEXAPRO) tablet 10 mg  10 mg Oral DAILY WITH DINNER    levothyroxine (SYNTHROID) tablet 300 mcg  300 mcg Oral 6am    potassium chloride SR (KLOR-CON 10) tablet 10 mEq  10 mEq Oral DAILY    sodium chloride (NS) flush 5-40 mL  5-40 mL IntraVENous Q8H    sodium chloride (NS) flush 5-40 mL  5-40 mL IntraVENous PRN    acetaminophen (TYLENOL) tablet 650 mg  650 mg Oral Q6H PRN    Or    acetaminophen (TYLENOL) suppository 650 mg  650 mg Rectal Q6H PRN    polyethylene glycol (MIRALAX) packet 17 g  17 g Oral DAILY PRN    ondansetron (ZOFRAN ODT) tablet 4 mg  4 mg Oral Q8H PRN    Or    ondansetron (ZOFRAN) injection 4 mg  4 mg IntraVENous Q6H PRN    oxyCODONE-acetaminophen (PERCOCET) 5-325 mg per tablet 1 Tablet  1 Tablet Oral Q6H PRN    ascorbic acid (vitamin C) (VITAMIN C) tablet 1,000 mg  1,000 mg Oral DAILY    zinc sulfate (ZINCATE) 50 mg zinc (220 mg) capsule 1 Capsule  1 Capsule Oral DAILY    guaiFENesin ER (MUCINEX) tablet 600 mg  600 mg Oral Q12H    albuterol (PROVENTIL HFA, VENTOLIN HFA, PROAIR HFA) inhaler 2 Puff  2 Puff Inhalation Q6H RT    0.9% sodium chloride infusion  75 mL/hr IntraVENous CONTINUOUS    propranoloL (INDERAL) tablet 20 mg  20 mg Oral BID    furosemide (LASIX) tablet 20 mg  20 mg Oral DAILY    fluticasone propionate (FLONASE) 50 mcg/actuation nasal spray 2 Spray  2 Spray Both Nostrils DAILY     Current Outpatient Medications   Medication Sig    propranoloL (INDERAL) 20 mg tablet Take 20 mg by mouth two (2) times a day.  furosemide (LASIX) 20 mg tablet Take 20 mg by mouth daily.  fluticasone propionate (FLONASE) 50 mcg/actuation nasal spray USE 2 SPRAY(S) IN EACH NOSTRIL ONCE DAILY    fluticasone propion-salmeteroL (ADVAIR/WIXELA) 100-50 mcg/dose diskus inhaler     chlorthalidone (HYGROTON) 25 mg tablet Take 25 mg by mouth daily.  potassium chloride SR (KLOR-CON 10) 10 mEq tablet Take 1 Tablet by mouth daily.  amLODIPine (NORVASC) 10 mg tablet Take 1 Tab by mouth daily.  aspirin 81 mg chewable tablet Take 1 Tab by mouth daily.  atorvastatin (LIPITOR) 80 mg tablet Take 1 Tab by mouth nightly.  butalbital-acetaminophen-caffeine (FIORICET, ESGIC) -40 mg per tablet Take 1 Tab by mouth every six (6) hours as needed for Headache or Migraine.  escitalopram oxalate (LEXAPRO) 10 mg tablet Take 1 Tab by mouth daily (with dinner).  levothyroxine (SYNTHROID) 300 mcg tablet Take  by mouth Daily (before breakfast).  naproxen (NAPROSYN) 500 mg tablet Take 1 Tab by mouth every twelve (12) hours as needed for Pain.  cyclobenzaprine (FLEXERIL) 10 mg tablet Take 1 Tab by mouth three (3) times daily as needed for Muscle Spasm(s).          Review of Systems:  10 point review of systems otherwise negative except as per HPI      Physical Exam:  Constitutional Alert, cooperative, oriented. Mood and affect appropriate. Appears close to chronological age. Well nourished. Well developed. Head Normocephalic; no scars   Eyes Conjunctivae and sclerae are clear and without icterus. Pupils are reactive and equal.   ENMT Not examined she is wearing a mask. Neck Supple without masses or thyromegaly. No jugular venous distension. Hematologic/Lymphatic No petechiae or purpura. No tender or palpable lymph nodes in the cervical, supraclavicular, axillary or inguinal area. Respiratory Lungs are clear to auscultation without rhonchi or wheezing. Cardiovascular Regular rate and rhythm of heart without murmurs, gallops or rubs. Chest / Line Site Chest is symmetric with no chest wall deformities. Abdomen Non-tender, non-distended, no masses, ascites or hepatosplenomegaly. Good bowel sounds. No guarding or rebound tenderness. No pulsatile masses. Musculoskeletal No tenderness or swelling, normal range of motion without obvious weakness. Extremities No visible deformities, no cyanosis, clubbing or edema. Skin No rashes, scars, or lesions suggestive of malignancy. No petechiae, purpura, or ecchymoses. No excoriations. Neurologic No sensory or motor deficits noted   Psychiatric Alert. Coherent speech. Verbalizes understanding of our discussions today. Assessment/plan:    Dionisio Rider is a  48y.o. year old seen in consultation at the request of Dr. Fadia Landa for adrenal hemorrhage. She has a h/o asthma, TIA, HTN, and recent covid19 vaccine 8/27 and covid19 dx 8/27. Adrenal hemorrhage  Adrenal hemorrhage is a rare but reported complication of FLHWM18 infection. COVID19 has been associated with coagulopathy and thromboembolic events. A case report was found of COVID19 causing bilateral adrenal hemorrhage due to renal vein thrombosis. (J Clin Endocrin Metab 2020).  Her US/CT are also concerning for possible mass lesion that could have hemorrhaged. Other causes include trauma, sepsis, coagulopathies, anti-coagulant use, pregnancy, APS, and essential thrombocytosis as well as idiopathic. She has unilateral hemorrhage, but can test for adrenal axis hormones to ensure no e/o adrenal insufficiency with AM cortisol and ACTH. Although this is rare in unilateral adrenal hemorrhage. It is reassuring she has no hypotension, hyponatremia, hypoglycemia, hyperkalemia. She is getting an MRI to evaluate for adrenal mass. If this shows a mass that is concerning for malignancy then I will test for hormonally active adrenal tumors with serum free metanephrines, etc.     Will check for PT/INR, PTT, fibrinogen for coagulopathy evaluation. Unless coagulopathy is found would recommend conservative management unless persistent bleeding or hemodynamic instability. If extensive RP bleeding occurs then angiography and embolization could be considered, but it appears to be small, contained, and other than pain not causing symptoms. Agree with holding aspirin for now. No concern for venous thrombosis as the cause of the hemorrhage, so no indication for anticoagulation. Will recheck Anti-cardiolipin, lupus anticoagulant, and anti-B2MG to eval for APS    Thank you for allowing me to participate in the care of this patient. Please do not hesitate to contact me if questions or concerns arise.      Rocael Kern MD  [facility] INPATIENT REHABILITATION office  [address] [facility], [facility] [address]  Phone [phone]  Fax [phone]

## 2021-09-15 NOTE — PROGRESS NOTES

## 2021-09-15 NOTE — ED NOTES
Pt given boxed lunch, apple juice, gingerale, in nad; resting on stretcher, lights off for comfort, oriented to call bell;

## 2021-09-15 NOTE — PROGRESS NOTES
Hospitalist Progress Note    NAME: Ernesto Rodriguez   :  1975   MRN:  [id number]       Assessment / Plan:  Right lower quadrant/flank abdominal pain POA  Suspected right adrenal hemorrhage POA-hypodense lesion of 4.3 cm on CT   in the setting of recently diagnosed COVID-19 infection with pneumonia POA-  Covid test positive as outpatient    CTA chest negative for PE, bilateral Covid pneumonia much improved noted  CT abdomen and pelvis= 4.3 cm right adrenal/adnexal lesion/mass with questionable hemorrhage    Patient is not hypoxic and stable from COVID-19 standpoint, still remains positive.   Pain management for right lower quadrant abdominal pain  Hemoglobin stable  Inpatient hematology consulted for further recommendation  Hold off home aspirin for now-patient has not been on it for a while after she was discharged on it many months ago for TIA  USG reviewed, will get MRI for further evaluation of adrenal mass  Continue to treat supportively for COVID-19 pneumonia    Hypokalemia:  Repleted, will repeat in AM     Acquired hypothyroidism status post thyroid cancer status post surgery POA  Continue home Synthroid dose at 300 mcg daily     Hypertension  Continue amlodipine, propranolol  Holding chlorthalidone for now     Hyperlipidemia  Continue Lipitor     Anxiety disorder  Continue Lexapro     Code Status: Full code     DVT Prophylaxis: SCDs for now  GI Prophylaxis: not indicated     Baseline: Patient is independent with ADLs at home    Severe obesity: BMI 44  Estimated discharge date:   Barriers: Clinical improvement     Subjective:     Chief Complaint / Reason for Physician Visit  F/u for right adrenal mass  She continues to have right illiac fossa pain    Review of Systems:  Symptom Y/N Comments  Symptom Y/N Comments   Fever/Chills n   Chest Pain n    Poor Appetite n   Edema     Cough n   Abdominal Pain     Sputum    Joint Pain     SOB/HAHN    Pruritis/Rash     Nausea/vomit    Tolerating PT/OT Diarrhea    Tolerating Diet     Constipation    Other       Could NOT obtain due to:      Objective:     VITALS:   Last 24hrs VS reviewed since prior progress note. Most recent are:  Patient Vitals for the past 24 hrs:   Temp Pulse Resp BP SpO2   09/15/21 1333     95 %   09/15/21 0800 98.1 °F (36.7 °C) 78 19 117/70 96 %   09/15/21 0754     94 %   09/15/21 0700  (!) 53 12 109/64 95 %   09/15/21 0600  (!) 53 12 (!) 109/54 94 %   09/15/21 0500  (!) 55 17 108/64 94 %   09/15/21 0300  62 15 108/71 93 %   09/15/21 0200  (!) 57 14 (!) 103/55 93 %   09/15/21 0100  (!) 55 13 106/71 92 %   09/14/21 2300  (!) 57 15 112/66 95 %   09/14/21 2230  (!) 54 16 119/66 95 %   09/14/21 2200  (!) 57 17  94 %   09/14/21 2100  60 18  98 %   09/14/21 2030  (!) 55 13 119/69 96 %   09/14/21 2000  (!) 54 19 109/70 94 %   09/14/21 1921 98.7 °F (37.1 °C) 69 18 (!) 163/98 99 %     No intake or output data in the 24 hours ending 09/15/21 1420     I had a face to face encounter and independently examined this patient on 9/15/2021, as outlined below:  PHYSICAL EXAM:  General: WD, WN. Alert, cooperative, no acute distress    EENT:  EOMI. Anicteric sclerae. MMM  Resp:  CTA bilaterally, no wheezing or rales. No accessory muscle use  CV:  Regular  rhythm,  No edema  GI:  Right illiac fossa minimal tenderness  Neurologic:  Alert and oriented X 3, normal speech,   Psych:   Good insight. Not anxious nor agitated  Skin:  No rashes.   No jaundice    Reviewed most current lab test results and cultures  YES  Reviewed most current radiology test results   YES  Review and summation of old records today    NO  Reviewed patient's current orders and MAR    YES  PMH/SH reviewed - no change compared to H&P  ________________________________________________________________________  Care Plan discussed with:    Comments   Patient y    Family      RN y    Care Manager     Consultant                        Multidiciplinary team rounds were held today with , nursing, pharmacist and clinical coordinator. Patient's plan of care was discussed; medications were reviewed and discharge planning was addressed. ________________________________________________________________________  Total NON critical care TIME: 35  Minutes    Total CRITICAL CARE TIME Spent:   Minutes non procedure based      Comments   >50% of visit spent in counseling and coordination of care     ________________________________________________________________________  Osman Harley MD     Procedures: see electronic medical records for all procedures/Xrays and details which were not copied into this note but were reviewed prior to creation of Plan. LABS:  I reviewed today's most current labs and imaging studies.   Pertinent labs include:  Recent Labs     09/15/21  1139 09/14/21  1901 09/14/21  1446   WBC 12.7*  --  9.8   HGB 12.1 11.7 11.8   HCT 38.2 36.9 37.2   *  --  452*     Recent Labs     09/15/21  0451 09/14/21  1446    141   K 3.3* 3.4*    105   CO2 32 33*   GLU 99 95   BUN 12 11   CREA 0.68 0.76   CA 8.4* 8.3*   MG 2.3  --    ALB  --  3.0*   TBILI  --  0.5   ALT  --  38       Signed: Osman Harley MD

## 2021-09-16 LAB
ALBUMIN SERPL-MCNC: 2.7 G/DL (ref 3.5–5)
ALBUMIN/GLOB SERPL: 0.9 {RATIO} (ref 1.1–2.2)
ALP SERPL-CCNC: 68 U/L (ref 45–117)
ALT SERPL-CCNC: 27 U/L (ref 12–78)
ANION GAP SERPL CALC-SCNC: 3 MMOL/L (ref 5–15)
APTT PPP: 23.9 SEC (ref 22.1–31)
AST SERPL-CCNC: 12 U/L (ref 15–37)
BILIRUB SERPL-MCNC: 0.5 MG/DL (ref 0.2–1)
BUN SERPL-MCNC: 12 MG/DL (ref 6–20)
BUN/CREAT SERPL: 19 (ref 12–20)
CALCIUM SERPL-MCNC: 8.2 MG/DL (ref 8.5–10.1)
CHLORIDE SERPL-SCNC: 105 MMOL/L (ref 97–108)
CO2 SERPL-SCNC: 29 MMOL/L (ref 21–32)
COMMENT, HOLDF: NORMAL
CORTIS AM PEAK SERPL-MCNC: 3.1 UG/DL (ref 4.3–22.45)
CREAT SERPL-MCNC: 0.64 MG/DL (ref 0.55–1.02)
ERYTHROCYTE [DISTWIDTH] IN BLOOD BY AUTOMATED COUNT: 14.7 % (ref 11.5–14.5)
FIBRINOGEN PPP-MCNC: 395 MG/DL (ref 200–475)
GLOBULIN SER CALC-MCNC: 3.1 G/DL (ref 2–4)
GLUCOSE SERPL-MCNC: 87 MG/DL (ref 65–100)
HCT VFR BLD AUTO: 36.2 % (ref 35–47)
HGB BLD-MCNC: 11.2 G/DL (ref 11.5–16)
INR PPP: 1 (ref 0.9–1.1)
MCH RBC QN AUTO: 30.7 PG (ref 26–34)
MCHC RBC AUTO-ENTMCNC: 30.9 G/DL (ref 30–36.5)
MCV RBC AUTO: 99.2 FL (ref 80–99)
NRBC # BLD: 0 K/UL (ref 0–0.01)
NRBC BLD-RTO: 0 PER 100 WBC
PLATELET # BLD AUTO: 423 K/UL (ref 150–400)
PMV BLD AUTO: 8.8 FL (ref 8.9–12.9)
POTASSIUM SERPL-SCNC: 4.3 MMOL/L (ref 3.5–5.1)
PROT SERPL-MCNC: 5.8 G/DL (ref 6.4–8.2)
PROTHROMBIN TIME: 10.2 SEC (ref 9–11.1)
RBC # BLD AUTO: 3.65 M/UL (ref 3.8–5.2)
SAMPLES BEING HELD,HOLD: NORMAL
SODIUM SERPL-SCNC: 137 MMOL/L (ref 136–145)
THERAPEUTIC RANGE,PTTT: NORMAL SECS (ref 58–77)
WBC # BLD AUTO: 12.3 K/UL (ref 3.6–11)

## 2021-09-16 PROCEDURE — 94640 AIRWAY INHALATION TREATMENT: CPT

## 2021-09-16 PROCEDURE — 86146 BETA-2 GLYCOPROTEIN ANTIBODY: CPT

## 2021-09-16 PROCEDURE — 36415 COLL VENOUS BLD VENIPUNCTURE: CPT

## 2021-09-16 PROCEDURE — 86147 CARDIOLIPIN ANTIBODY EA IG: CPT

## 2021-09-16 PROCEDURE — 85613 RUSSELL VIPER VENOM DILUTED: CPT

## 2021-09-16 PROCEDURE — 74011250636 HC RX REV CODE- 250/636: Performed by: INTERNAL MEDICINE

## 2021-09-16 PROCEDURE — 82024 ASSAY OF ACTH: CPT

## 2021-09-16 PROCEDURE — 65270000029 HC RM PRIVATE

## 2021-09-16 PROCEDURE — 74011250637 HC RX REV CODE- 250/637: Performed by: INTERNAL MEDICINE

## 2021-09-16 PROCEDURE — 82533 TOTAL CORTISOL: CPT

## 2021-09-16 PROCEDURE — 80053 COMPREHEN METABOLIC PANEL: CPT

## 2021-09-16 PROCEDURE — 85384 FIBRINOGEN ACTIVITY: CPT

## 2021-09-16 PROCEDURE — 85027 COMPLETE CBC AUTOMATED: CPT

## 2021-09-16 PROCEDURE — 85610 PROTHROMBIN TIME: CPT

## 2021-09-16 PROCEDURE — 94761 N-INVAS EAR/PLS OXIMETRY MLT: CPT

## 2021-09-16 PROCEDURE — 85730 THROMBOPLASTIN TIME PARTIAL: CPT

## 2021-09-16 RX ORDER — GUAIFENESIN 100 MG/5ML
81 LIQUID (ML) ORAL DAILY
Status: DISCONTINUED | OUTPATIENT
Start: 2021-09-17 | End: 2021-09-17 | Stop reason: HOSPADM

## 2021-09-16 RX ADMIN — OXYCODONE HYDROCHLORIDE AND ACETAMINOPHEN 1 TABLET: 5; 325 TABLET ORAL at 22:19

## 2021-09-16 RX ADMIN — OXYCODONE HYDROCHLORIDE AND ACETAMINOPHEN 1000 MG: 500 TABLET ORAL at 08:31

## 2021-09-16 RX ADMIN — GUAIFENESIN 600 MG: 600 TABLET, EXTENDED RELEASE ORAL at 21:51

## 2021-09-16 RX ADMIN — PROPRANOLOL HYDROCHLORIDE 20 MG: 10 TABLET ORAL at 17:59

## 2021-09-16 RX ADMIN — ALBUTEROL SULFATE 2 PUFF: 90 AEROSOL, METERED RESPIRATORY (INHALATION) at 09:02

## 2021-09-16 RX ADMIN — ZINC SULFATE 220 MG (50 MG) CAPSULE 1 CAPSULE: CAPSULE at 08:31

## 2021-09-16 RX ADMIN — OXYCODONE HYDROCHLORIDE AND ACETAMINOPHEN 1 TABLET: 5; 325 TABLET ORAL at 15:50

## 2021-09-16 RX ADMIN — ALBUTEROL SULFATE 2 PUFF: 90 AEROSOL, METERED RESPIRATORY (INHALATION) at 13:41

## 2021-09-16 RX ADMIN — Medication 10 ML: at 22:00

## 2021-09-16 RX ADMIN — ATORVASTATIN CALCIUM 80 MG: 40 TABLET, FILM COATED ORAL at 21:50

## 2021-09-16 RX ADMIN — FLUTICASONE PROPIONATE 2 SPRAY: 50 SPRAY, METERED NASAL at 08:32

## 2021-09-16 RX ADMIN — SODIUM CHLORIDE 75 ML/HR: 9 INJECTION, SOLUTION INTRAVENOUS at 04:59

## 2021-09-16 RX ADMIN — ALBUTEROL SULFATE 2 PUFF: 90 AEROSOL, METERED RESPIRATORY (INHALATION) at 21:14

## 2021-09-16 RX ADMIN — POTASSIUM CHLORIDE 10 MEQ: 750 TABLET, FILM COATED, EXTENDED RELEASE ORAL at 08:31

## 2021-09-16 RX ADMIN — ESCITALOPRAM OXALATE 10 MG: 10 TABLET ORAL at 17:59

## 2021-09-16 RX ADMIN — Medication 10 ML: at 14:00

## 2021-09-16 RX ADMIN — ALBUTEROL SULFATE 2 PUFF: 90 AEROSOL, METERED RESPIRATORY (INHALATION) at 03:03

## 2021-09-16 RX ADMIN — PROPRANOLOL HYDROCHLORIDE 20 MG: 10 TABLET ORAL at 08:31

## 2021-09-16 RX ADMIN — FUROSEMIDE 20 MG: 40 TABLET ORAL at 08:31

## 2021-09-16 RX ADMIN — Medication 10 ML: at 05:00

## 2021-09-16 RX ADMIN — GUAIFENESIN 600 MG: 600 TABLET, EXTENDED RELEASE ORAL at 08:31

## 2021-09-16 RX ADMIN — LEVOTHYROXINE SODIUM 300 MCG: 0.15 TABLET ORAL at 05:00

## 2021-09-16 RX ADMIN — OXYCODONE HYDROCHLORIDE AND ACETAMINOPHEN 1 TABLET: 5; 325 TABLET ORAL at 04:57

## 2021-09-16 RX ADMIN — AMLODIPINE BESYLATE 10 MG: 5 TABLET ORAL at 08:31

## 2021-09-16 NOTE — PROGRESS NOTES
Transition of Care Plan:    RUR: 11% low risk  Disposition: Home with family assistance  Follow up appointments: PCP  DME needed: Pt has a cane. Transportation at Discharge: Pt's daughter  [address] or means to access home:  Yes      IM Medicare Letter: N/A  Is patient a BCPI-A Bundle:       No    If yes, was Bundle Letter given?:     Caregiver Contact: Kenji Dailey ([phone])  Discharge Caregiver contacted prior to discharge? Pt's cg will be contact prior to d/c    Reason for Admission:  Adrenal hemorrhage, Abdominal wall pain in right flank, PNA due to COVID 19 virus                     RUR Score:        11%             Plan for utilizing home health:    No needs identified at this time     PCP: First and Last name:  Annie Muir     Name of Practice: [facility]   Are you a current patient: Yes/No: Yes   Approximate date of last visit: 9/10/21    Can you participate in a virtual visit with your PCP: Yes                    Current Advanced Directive/Advance Care Plan: Full Code      Healthcare Decision Maker: Kenji Dailey ([phone])                  Transition of Care Plan:          Home with family assistance    CM contacted patient via telephone at the bedside to complete initial assessment due to contact isolation precautions. CM introduced role, verified demographics, and discussed discharge planning. Pt is a 49 yo female with an admitting diagnosis of  Adrenal hemorrhage, Abdominal wall pain in right flank, PNA due to COVID 19 virus. Pt uses [facility] on [address] [place] [address]. Pt lives alone in a 1 story apartment, on the 2nd floor, with several CHRISTO. Pt is independent of all ADL/IADLs including driving. Pt reports she uses a cane. Pt has no hx of HH, SNF, or IPR. Pt's daughter will transport her home at d/c. CM will continue to follow for discharge planning while patient is admitted on current unit.  Please contact this CM with questions or issues related to discharge. Care Management Interventions  PCP Verified by CM: Yes (BROCK Jones)  Last Visit to PCP: 09/10/21  Mode of Transport at Discharge: Other (see comment) (Pt's daughter)  Transition of Care Consult (CM Consult): Discharge Planning  Discharge Durable Medical Equipment: No (Pt has a cane)  Occupational Therapy Consult: No  Speech Therapy Consult: No  Support Systems: Child(marv)  Confirm Follow Up Transport: Self  Discharge Location  Discharge Placement: Home with family assistance  --------------------------------------  Advance Care Planning     General Advance Care Planning (ACP) Conversation      Date of Conversation: 9/16/2021  Conducted with: Patient with Decision Making Capacity    Healthcare Decision Maker:   No healthcare decision makers have been documented. Click here to complete [address] including selection of the Healthcare Decision Maker Relationship (ie \"Primary\")    Today we documented Decision Maker(s) consistent with Legal Next of Kin hierarchy.     Content/Action Overview:    DECLINED ACP conversation - will revisit periodically   Reviewed DNR/DNI and patient elects Full Code (Attempt Resuscitation)    Length of Voluntary ACP Conversation in minutes:  <16 minutes (Non-Billable)    YESSY Lai  Care Manager [facility]  [phone]

## 2021-09-16 NOTE — PROGRESS NOTES
Verbal shift change report given to Hannah Pierson (oncoming nurse) by Gisele Rowland (offgoing nurse). Report included the following information SBAR, Kardex, Intake/Output, MAR and Recent Results.

## 2021-09-16 NOTE — PROGRESS NOTES
Hematology Oncology Progress Note         Follow up for: c/f adrenal hemorrhage vs mass     Chart notes reviewed since last visit. MRI shows no hemorrhage but c/f collision tumor. Case discussed with following: RN    Interval history: Breathing is improved today. She does still have RLQ pain, which is her main complaint. Cough improving. No fevers or chills. No headaches. No severe HTN episodes. Additional concerns noted by the staff:     Patient Vitals for the past 24 hrs:   BP Temp Pulse Resp SpO2   09/16/21 0836 108/70 98.6 °F (37 °C) 69 18 93 %   09/16/21 0303     94 %   09/16/21 0042 (!) 110/58 98.6 °F (37 °C) 60 18 96 %   09/15/21 2321     95 %   09/15/21 2000 128/77 98.3 °F (36.8 °C) 64 18 96 %   09/15/21 1820 126/75 98.4 °F (36.9 °C) 60 18 92 %   09/15/21 1724 108/75 98.6 °F (37 °C) 72 17 92 %   09/15/21 1333     95 %       Review of Systems: 10 pt ROS otherwise negative      Physical Examination:  Constitutional Alert, cooperative, oriented. Mood and affect appropriate. Appears close to chronological age. Well nourished. Well developed. Head Normocephalic; no scars   Eyes Conjunctivae and sclerae are clear and without icterus. Pupils are reactive and equal.   ENMT Not examined- pt wearing a mass   Neck Supple without masses or thyromegaly. No jugular venous distension. Hematologic/Lymphatic No petechiae or purpura. Respiratory Lungs are clear to auscultation using isolation stethoscope. Cardiovascular Regular rate and rhythm of heart without murmurs, gallops or rubs. Chest / Line Site Chest is symmetric   Abdomen Mild tenderness in RLQ with deep palpation. non-distended, no masses, ascites or hepatosplenomegaly. Good bowel sounds. No guarding or rebound tenderness. No pulsatile masses. Musculoskeletal No tenderness or swelling, normal range of motion without obvious weakness. Extremities No visible deformities, no cyanosis, clubbing or edema.     Skin No rashes Neurologic No sensory or motor deficits   Psychiatric Alert and oriented times three. Coherent speech. Verbalizes understanding of our discussions today. Labs:  Recent Results (from the past 24 hour(s))   CBC W/O DIFF    Collection Time: 09/15/21 11:39 AM   Result Value Ref Range    WBC 12.7 (H) 3.6 - 11.0 K/uL    RBC 3.89 3.80 - 5.20 M/uL    HGB 12.1 11.5 - 16.0 g/dL    HCT 38.2 35.0 - 47.0 %    MCV 98.2 80.0 - 99.0 FL    MCH 31.1 26.0 - 34.0 PG    MCHC 31.7 30.0 - 36.5 g/dL    RDW 14.6 (H) 11.5 - 14.5 %    PLATELET 370 (H) 455 - 400 K/uL    MPV 8.5 (L) 8.9 - 12.9 FL    NRBC 0.0 0  WBC    ABSOLUTE NRBC 0.00 0.00 - 0.01 K/uL   SAMPLES BEING HELD    Collection Time: 09/16/21  3:27 AM   Result Value Ref Range    SAMPLES BEING HELD  LAV     COMMENT        Add-on orders for these samples will be processed based on acceptable specimen integrity and analyte stability, which may vary by analyte.    CBC W/O DIFF    Collection Time: 09/16/21  3:53 AM   Result Value Ref Range    WBC 12.3 (H) 3.6 - 11.0 K/uL    RBC 3.65 (L) 3.80 - 5.20 M/uL    HGB 11.2 (L) 11.5 - 16.0 g/dL    HCT 36.2 35.0 - 47.0 %    MCV 99.2 (H) 80.0 - 99.0 FL    MCH 30.7 26.0 - 34.0 PG    MCHC 30.9 30.0 - 36.5 g/dL    RDW 14.7 (H) 11.5 - 14.5 %    PLATELET 733 (H) 775 - 400 K/uL    MPV 8.8 (L) 8.9 - 12.9 FL    NRBC 0.0 0  WBC    ABSOLUTE NRBC 0.00 0.00 - 3.16 K/uL   METABOLIC PANEL, COMPREHENSIVE    Collection Time: 09/16/21  3:53 AM   Result Value Ref Range    Sodium 137 136 - 145 mmol/L    Potassium 4.3 3.5 - 5.1 mmol/L    Chloride 105 97 - 108 mmol/L    CO2 29 21 - 32 mmol/L    Anion gap 3 (L) 5 - 15 mmol/L    Glucose 87 65 - 100 mg/dL    BUN 12 6 - 20 MG/DL    Creatinine 0.64 0.55 - 1.02 MG/DL    BUN/Creatinine ratio 19 12 - 20      GFR est AA >60 >60 ml/min/1.73m2    GFR est non-AA >60 >60 ml/min/1.73m2    Calcium 8.2 (L) 8.5 - 10.1 MG/DL    Bilirubin, total 0.5 0.2 - 1.0 MG/DL    ALT (SGPT) 27 12 - 78 U/L    AST (SGOT) 12 (L) 15 - 37 U/L    Alk. phosphatase 68 45 - 117 U/L    Protein, total 5.8 (L) 6.4 - 8.2 g/dL    Albumin 2.7 (L) 3.5 - 5.0 g/dL    Globulin 3.1 2.0 - 4.0 g/dL    A-G Ratio 0.9 (L) 1.1 - 2.2     FIBRINOGEN    Collection Time: 09/16/21  3:53 AM   Result Value Ref Range    Fibrinogen 395 200 - 475 mg/dL   PROTHROMBIN TIME + INR    Collection Time: 09/16/21  3:53 AM   Result Value Ref Range    INR 1.0 0.9 - 1.1      Prothrombin time 10.2 9.0 - 11.1 sec   PTT    Collection Time: 09/16/21  3:53 AM   Result Value Ref Range    aPTT 23.9 22.1 - 31.0 sec    aPTT, therapeutic range     58.0 - 77.0 SECS       Imaging:  MRI Abd 9/15/21  Complex lesion in the right adrenal gland. Right adrenal mass again noted. The lesion measures approximately 3.8  cm in diameter. The previously noted hyperdense region demonstrates progressive enhancement on delayed images and T2 hyperintensity. The remaining portion of the lesion demonstrates signal loss on  out of phase imaging. 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 T2 hyperintensity and enhancement can be  seen with pheochromocytomas although not entirely specific. Recommend laboratory correlation for functioning adrenal masses including catecholamines. The signal characteristics are inconsistent with hemorrhage. CTA Chest 9/14/21:     1. No evidence of pulmonary embolus.     2.  Scattered peripheral ground glass opacities with spiculation likely  reflecting Covid 19 pneumonia possibly residual    Assessment and Plan:      Billie Glover is a  48y.o. year old seen in consultation at the request of Dr. Dorothy Willis for c/f adrenal hemorrhage which turned out to be likely collision tumor on MRI. She has a h/o asthma, TIA, HTN, and recent covid19 vaccine 8/27 and covid19 dx 8/27. She was found to have an incidental adrenal mass 3.8cm in size, with concern for collision tumor.  Currently undergoing workup.      Adrenal mass  She has a 3.8cm right adrenal mass on MRI with progressive enhancement, which may represent a collision tumor with adenoma and a component of a second lesion.   -Workup for adrenal mass to include: ACTH, DHEA-S, 1mg dexamethasone suppression test, aldosterone, renin, plasma free metanephrine  -will plan to perform 1mg dexa suppression test once pheo has been ruled out.  -Will need biopsy or surgery consult in the future but will order hormone tests first as would not want to biopsy the mass without knowing if it is a pheo first. Gen surgery was consulted today. Adrenal hemorrhage-not present on MRI   Based on MRI there is no component of hemorrhage. This was suspected on CT scan but was not confirmed on MRI.     Mechele Denver, MD  [facility] INPATIENT REHABILITATION office  [address] [facility], [facility] [address]  Phone [phone]  Fax [phone]

## 2021-09-16 NOTE — CONSULTS
Surgery Consult  Consulted by: Dr. Ines Abad. Thank you. PCP: BROCK Napier    History and data reviewed. Pt not yet interviewed/examined. Impression:  Right adrenal incidentaloma 4.3cm that does not appear to be an adenoma. Active COVID. Plan:  Biochemical work-up while she's here. Follow-up outpatient once over COVID and pulmonary status good enough for general anesthesia then will plan for robotic right adrenalectomy. FULL NOTE ADDENDUM WILL BE ADDED BELOW. Thanks.   Signed By: Macario New MD     September 16, 2021      ------------------------------------------------------------------------

## 2021-09-16 NOTE — PROGRESS NOTES
Hospitalist Progress Note    NAME: Joseph Bailey   :  1975   MRN:  [id number]       Assessment / Plan:  Right lower quadrant/flank abdominal pain POA  Adrenal adenoma:  Recent Covid 19 pneumonia:    Suspected right adrenal hemorrhage POA on CT, however MRI show:  Complex lesion in Rt Adrenal gland, Inconsistent with hemorrhage, may be a colison tumor, with adrenal adenoma and a second lesion. CTA chest negative for PE, bilateral Covid pneumonia much improved noted  CT abdomen and pelvis= 4.3 cm right adrenal/adnexal lesion/mass with questionable hemorrhage    Patient is not hypoxic and stable from COVID-19 standpoint, still remains positive.   Still continues to have right lower quadrant pain, with tenderness on palpation, will get general surgery consult, npo until then  Work up for Adrenal mass ongoing for ACTH, DHEA-S, plasma free metanephrine  Oncology following  Will resume aspirin now as MRI doesn't show hemorrhage  Continue to treat supportively for COVID-19 pneumonia    Hypokalemia:  Normal today     Acquired hypothyroidism status post thyroid cancer status post surgery POA  Continue home Synthroid dose at 300 mcg daily     Hypertension  Continue amlodipine, propranolol  Holding chlorthalidone for now     Hyperlipidemia  Continue Lipitor     Anxiety disorder  Continue Lexapro     Code Status: Full code     DVT Prophylaxis: SCDs for now  GI Prophylaxis: not indicated     Baseline: Patient is independent with ADLs at home    Severe obesity: BMI 44  Estimated discharge date:   Barriers: Clinical improvement     Subjective:     Chief Complaint / Reason for Physician Visit  F/u for right adrenal mass  She continues to have right illiac fossa pain, with some tenderness on palpation    Review of Systems:  Symptom Y/N Comments  Symptom Y/N Comments   Fever/Chills n   Chest Pain n    Poor Appetite n   Edema     Cough n   Abdominal Pain     Sputum    Joint Pain     SOB/HAHN    Pruritis/Rash Nausea/vomit    Tolerating PT/OT     Diarrhea    Tolerating Diet     Constipation    Other       Could NOT obtain due to:      Objective:     VITALS:   Last 24hrs VS reviewed since prior progress note. Most recent are:  Patient Vitals for the past 24 hrs:   Temp Pulse Resp BP SpO2   09/16/21 1537 97.9 °F (36.6 °C) 64 18 113/61 92 %   09/16/21 1342     95 %   09/16/21 0903     94 %   09/16/21 0836 98.6 °F (37 °C) 69 18 108/70 93 %   09/16/21 0303     94 %   09/16/21 0042 98.6 °F (37 °C) 60 18 (!) 110/58 96 %   09/15/21 2321     95 %   09/15/21 2000 98.3 °F (36.8 °C) 64 18 128/77 96 %   09/15/21 1820 98.4 °F (36.9 °C) 60 18 126/75 92 %   09/15/21 1724 98.6 °F (37 °C) 72 17 108/75 92 %     No intake or output data in the 24 hours ending 09/16/21 1618     I had a face to face encounter and independently examined this patient on 9/16/2021, as outlined below:  PHYSICAL EXAM:  General: WD, WN. Alert, cooperative, no acute distress    EENT:  EOMI. Anicteric sclerae. MMM  Resp:  CTA bilaterally, no wheezing or rales. No accessory muscle use  CV:  Regular  rhythm,  No edema  GI:  Right illiac fossa minimal tenderness  Neurologic:  Alert and oriented X 3, normal speech,   Psych:   Good insight. Not anxious nor agitated  Skin:  No rashes. No jaundice    Reviewed most current lab test results and cultures  YES  Reviewed most current radiology test results   YES  Review and summation of old records today    NO  Reviewed patient's current orders and MAR    YES  PMH/SH reviewed - no change compared to H&P  ________________________________________________________________________  Care Plan discussed with:    Comments   Patient y    Family      RN y    Care Manager     Consultant                        Multidiciplinary team rounds were held today with , nursing, pharmacist and clinical coordinator. Patient's plan of care was discussed; medications were reviewed and discharge planning was addressed. ________________________________________________________________________  Total NON critical care TIME: 35  Minutes    Total CRITICAL CARE TIME Spent:   Minutes non procedure based      Comments   >50% of visit spent in counseling and coordination of care     ________________________________________________________________________  Sharlene Umana MD     Procedures: see electronic medical records for all procedures/Xrays and details which were not copied into this note but were reviewed prior to creation of Plan. LABS:  I reviewed today's most current labs and imaging studies. Pertinent labs include:  Recent Labs     09/16/21  0353 09/15/21  1139 09/14/21  1901 09/14/21  1446 09/14/21  1446   WBC 12.3* 12.7*  --   --  9.8   HGB 11.2* 12.1 11.7   < > 11.8   HCT 36.2 38.2 36.9   < > 37.2   * 434*  --   --  452*    < > = values in this interval not displayed.      Recent Labs     09/16/21  0353 09/15/21  0451 09/14/21  1446    138 141   K 4.3 3.3* 3.4*    104 105   CO2 29 32 33*   GLU 87 99 95   BUN 12 12 11   CREA 0.64 0.68 0.76   CA 8.2* 8.4* 8.3*   MG  --  2.3  --    ALB 2.7*  --  3.0*   TBILI 0.5  --  0.5   ALT 27  --  38   INR 1.0  --   --        Signed: Sharlene Umana MD

## 2021-09-17 VITALS
TEMPERATURE: 98.5 F | BODY MASS INDEX: 43.9 KG/M2 | OXYGEN SATURATION: 95 % | DIASTOLIC BLOOD PRESSURE: 64 MMHG | WEIGHT: 273.15 LBS | HEIGHT: 66 IN | RESPIRATION RATE: 20 BRPM | HEART RATE: 82 BPM | SYSTOLIC BLOOD PRESSURE: 121 MMHG

## 2021-09-17 LAB
ACTH PLAS-MCNC: 13.2 PG/ML (ref 7.2–63.3)
CARDIOLIPIN IGA SER IA-ACNC: <9 APL U/ML (ref 0–11)
CARDIOLIPIN IGG SER IA-ACNC: <9 GPL U/ML (ref 0–14)
CARDIOLIPIN IGM SER IA-ACNC: 10 MPL U/ML (ref 0–12)
INTERPRETATION, 117893: NORMAL
SCREEN APTT: 32.2 SEC (ref 0–51.9)
SCREEN DRVVT: 35.3 SEC (ref 0–47)

## 2021-09-17 PROCEDURE — 82627 DEHYDROEPIANDROSTERONE: CPT

## 2021-09-17 PROCEDURE — 94761 N-INVAS EAR/PLS OXIMETRY MLT: CPT

## 2021-09-17 PROCEDURE — 83835 ASSAY OF METANEPHRINES: CPT

## 2021-09-17 PROCEDURE — 94640 AIRWAY INHALATION TREATMENT: CPT

## 2021-09-17 PROCEDURE — 74011250637 HC RX REV CODE- 250/637: Performed by: STUDENT IN AN ORGANIZED HEALTH CARE EDUCATION/TRAINING PROGRAM

## 2021-09-17 PROCEDURE — 74011250637 HC RX REV CODE- 250/637: Performed by: INTERNAL MEDICINE

## 2021-09-17 PROCEDURE — 94762 N-INVAS EAR/PLS OXIMTRY CONT: CPT

## 2021-09-17 PROCEDURE — 36415 COLL VENOUS BLD VENIPUNCTURE: CPT

## 2021-09-17 PROCEDURE — 84244 ASSAY OF RENIN: CPT

## 2021-09-17 PROCEDURE — 74011250636 HC RX REV CODE- 250/636: Performed by: INTERNAL MEDICINE

## 2021-09-17 PROCEDURE — 82088 ASSAY OF ALDOSTERONE: CPT

## 2021-09-17 RX ORDER — OXYCODONE AND ACETAMINOPHEN 5; 325 MG/1; MG/1
1 TABLET ORAL
Qty: 12 TABLET | Refills: 0 | Status: SHIPPED | OUTPATIENT
Start: 2021-09-17 | End: 2021-09-20

## 2021-09-17 RX ORDER — ALBUTEROL SULFATE 90 UG/1
2 AEROSOL, METERED RESPIRATORY (INHALATION)
Qty: 18 G | Refills: 1 | Status: SHIPPED | OUTPATIENT
Start: 2021-09-17

## 2021-09-17 RX ADMIN — FLUTICASONE PROPIONATE 2 SPRAY: 50 SPRAY, METERED NASAL at 08:52

## 2021-09-17 RX ADMIN — ALBUTEROL SULFATE 2 PUFF: 90 AEROSOL, METERED RESPIRATORY (INHALATION) at 07:53

## 2021-09-17 RX ADMIN — OXYCODONE HYDROCHLORIDE AND ACETAMINOPHEN 1 TABLET: 5; 325 TABLET ORAL at 06:53

## 2021-09-17 RX ADMIN — LEVOTHYROXINE SODIUM 300 MCG: 0.15 TABLET ORAL at 06:52

## 2021-09-17 RX ADMIN — ALBUTEROL SULFATE 2 PUFF: 90 AEROSOL, METERED RESPIRATORY (INHALATION) at 14:18

## 2021-09-17 RX ADMIN — SODIUM CHLORIDE 75 ML/HR: 9 INJECTION, SOLUTION INTRAVENOUS at 06:57

## 2021-09-17 RX ADMIN — ALBUTEROL SULFATE 2 PUFF: 90 AEROSOL, METERED RESPIRATORY (INHALATION) at 03:16

## 2021-09-17 RX ADMIN — PROPRANOLOL HYDROCHLORIDE 20 MG: 10 TABLET ORAL at 18:03

## 2021-09-17 RX ADMIN — FUROSEMIDE 20 MG: 40 TABLET ORAL at 08:48

## 2021-09-17 RX ADMIN — POTASSIUM CHLORIDE 10 MEQ: 750 TABLET, FILM COATED, EXTENDED RELEASE ORAL at 08:48

## 2021-09-17 RX ADMIN — OXYCODONE HYDROCHLORIDE AND ACETAMINOPHEN 1000 MG: 500 TABLET ORAL at 08:48

## 2021-09-17 RX ADMIN — PROPRANOLOL HYDROCHLORIDE 20 MG: 10 TABLET ORAL at 08:49

## 2021-09-17 RX ADMIN — Medication 10 ML: at 06:53

## 2021-09-17 RX ADMIN — GUAIFENESIN 600 MG: 600 TABLET, EXTENDED RELEASE ORAL at 08:48

## 2021-09-17 RX ADMIN — ZINC SULFATE 220 MG (50 MG) CAPSULE 1 CAPSULE: CAPSULE at 08:48

## 2021-09-17 RX ADMIN — ASPIRIN 81 MG CHEWABLE TABLET 81 MG: 81 TABLET CHEWABLE at 08:48

## 2021-09-17 RX ADMIN — ESCITALOPRAM OXALATE 10 MG: 10 TABLET ORAL at 18:03

## 2021-09-17 NOTE — DISCHARGE SUMMARY
Hospitalist Discharge Summary     Patient ID:  Baldo Brennan  [id number]  89 y.o.  1975  9/14/2021    PCP on record: Steffanie Enrique, [facility] date: 9/14/2021  Discharge date and time: 9/17/2021    DISCHARGE DIAGNOSIS:  Right adrenal mass  Right lower quadrant pain  Covid 19 pneumonia    Follow-up Information     Follow up With Specialties Details Why Contact Info    Steffanie Enrique, [address]  On 9/24/2021 8:40AM  [address]      Juarez Torres MD Hematology and Oncology Schedule an appointment as soon as possible for a visit in 1 week  [address]  [address]  [phone]            CONSULTATIONS:  IP CONSULT TO [address]    Excerpted HPI from H&P of Angel Harris MD:  Baldo Brennan is a 48 y.o.  female who presents with above complaints from home ambulatory. Patient present with chief complaint of worsening shortness of breath with associated right-sided abdominal pain that started in the past couple of days. History of having COVID-19 infection diagnosed on 8/27-was seen in ED was sent home on oral medications as patient was stable and not hypoxic. History of taking 1 shot for COVID-19 vaccine but was due for his second shot which she never got before becoming positive for Covid. History of completing Z-John and steroids. History of associated atypical chest pain but more right lower quadrant abdominal pain as per patient. History of thyroid cancer in the past requiring resection, history of oral cyst requiring surgical excisions     Patient was found to be hemodynamically stable with mild hypokalemia with otherwise unremarkable blood work in the ED, found to have 4.3 cm hypodense lesion in the right adrenal gland on CT imaging.   With negative CTA chest for any PE but positive for improving bilateral Covid pneumonia.    ______________________________________________________________________  DISCHARGE SUMMARY/HOSPITAL COURSE:  for full details see H&P, daily progress notes, labs, consult notes. 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 CTA chest negative for PE, bilateral Covid pneumonia much improved noted  CT abdomen and pelvis= 4.3 cm right adrenal/adnexal lesion/mass with questionable hemorrhage     Patient is not hypoxic and stable from COVID-19 standpoint, still remains positive. Pain better today, seen by surgery, recommended outpatient eval  Work up for Adrenal mass ongoing for ACTH, DHEA-S, plasma free metanephrine  Discussed with oncology, can be discharged today with outpatient f/u for ongoing workup for adrenal mass. Hypokalemia:  Normal today     Acquired hypothyroidism status post thyroid cancer status post surgery POA  Continue home Synthroid dose at 300 mcg daily     Hypertension  Hyperlipidemia  Anxiety disorder     Resume home meds        _______________________________________________________________________  Patient seen and examined by me on discharge day. Pertinent Findings:  Gen:    Not in distress  Chest: Clear lungs  CVS:   Regular rhythm. No edema  Abd:  Soft, not distended, not tender  Neuro:  Alert,   _______________________________________________________________________  DISCHARGE MEDICATIONS:   Current Discharge Medication List      START taking these medications    Details   oxyCODONE-acetaminophen (PERCOCET) 5-325 mg per tablet Take 1 Tablet by mouth every six (6) hours as needed for Pain for up to 3 days. Max Daily Amount: 4 Tablets.   Qty: 12 Tablet, Refills: 0  Start date: 9/17/2021, End date: 9/20/2021    Associated Diagnoses: Abdominal wall pain in right flank      albuterol (PROVENTIL HFA, VENTOLIN HFA, PROAIR HFA) 90 mcg/actuation inhaler Take 2 Puffs by inhalation every six (6) hours as needed for Wheezing. Qty: 18 g, Refills: 1  Start date: 9/17/2021         CONTINUE these medications which have NOT CHANGED    Details   propranoloL (INDERAL) 20 mg tablet Take 20 mg by mouth two (2) times a day. furosemide (LASIX) 20 mg tablet Take 20 mg by mouth daily. fluticasone propionate (FLONASE) 50 mcg/actuation nasal spray USE 2 SPRAY(S) IN EACH NOSTRIL ONCE DAILY      fluticasone propion-salmeteroL (ADVAIR/WIXELA) 100-50 mcg/dose diskus inhaler       chlorthalidone (HYGROTON) 25 mg tablet Take 25 mg by mouth daily. potassium chloride SR (KLOR-CON 10) 10 mEq tablet Take 1 Tablet by mouth daily. Qty: 30 Tablet, Refills: 0      amLODIPine (NORVASC) 10 mg tablet Take 1 Tab by mouth daily. Qty: 30 Tab, Refills: 2      aspirin 81 mg chewable tablet Take 1 Tab by mouth daily. Qty: 30 Tab, Refills: 2      atorvastatin (LIPITOR) 80 mg tablet Take 1 Tab by mouth nightly. Qty: 30 Tab, Refills: 2      butalbital-acetaminophen-caffeine (FIORICET, ESGIC) -40 mg per tablet Take 1 Tab by mouth every six (6) hours as needed for Headache or Migraine. Qty: 20 Tab, Refills: 0      escitalopram oxalate (LEXAPRO) 10 mg tablet Take 1 Tab by mouth daily (with dinner). Qty: 30 Tab, Refills: 2      levothyroxine (SYNTHROID) 300 mcg tablet Take  by mouth Daily (before breakfast). naproxen (NAPROSYN) 500 mg tablet Take 1 Tab by mouth every twelve (12) hours as needed for Pain. Qty: 20 Tab, Refills: 0      cyclobenzaprine (FLEXERIL) 10 mg tablet Take 1 Tab by mouth three (3) times daily as needed for Muscle Spasm(s). Qty: 20 Tab, Refills: 0               Patient Follow Up Instructions:    Activity: Activity as tolerated  Diet: Cardiac Diet  Wound Care: None needed    Follow-up with your PMD    Follow-up Information     Follow up With Specialties Details Why [address], [address], [place]  On 9/24/2021 8:40AM  [address] [address] [facility]  Balaji Porter MD Hematology and Oncology Schedule an appointment as soon as possible for a visit in 1 week  [address] [facility]  [phone]          ________________________________________________________________    Risk of deterioration: Low    Condition at Discharge:  Stable  __________________________________________________________________    Disposition  Home with family, no needs    ____________________________________________________________________    Code Status: Full Code  ___________________________________________________________________      Total time in minutes spent coordinating this discharge (includes going over instructions, follow-up, prescriptions, and preparing report for sign off to her PCP) :  >30 minutes    Signed:  Leonardo Ortiz MD

## 2021-09-17 NOTE — PROGRESS NOTES
Pt ready for discharge from a CM standpoint. Pt's sister to transport around 7:00pm.    Transition of Care Plan:     RUR: 11% low risk  Disposition: Home with follow up appointments  Follow up appointments: PCP  DME needed: Pt has a cane. Transportation at Discharge:Pt's sister to transport around 7:00pm  Keys or means to access home:  Yes      IM Medicare Letter: Commercial insurance  Is patient a BCPI-A Bundle:  Not identified as a bundle patient               If yes, was Bundle Letter given?:     Caregiver Contact: DaughterThomsa ([phone])  Discharge Caregiver contacted prior to discharge? Pt has provided update to her family. CM spoke with patient via telephone who reports that her sister will be transporting her home this evening around 7:00pm.      Care Management Interventions  PCP Verified by CM: Yes  Last Visit to PCP: 09/10/21  Mode of Transport at Discharge: Other (see comment) (Pt's daughter)  Transition of Care Consult (CM Consult): Discharge Planning  Discharge Durable Medical Equipment: No  Physical Therapy Consult: No  Occupational Therapy Consult: No  Speech Therapy Consult: No  Support Systems: Child(marv)  Confirm Follow Up Transport: Family  Discharge Location  Discharge Placement: Home with family assistance    Evette Dorman, [address] - ED [facility]  [employer] ACP Facilitator  Zone Phone: [phone]

## 2021-09-17 NOTE — PROGRESS NOTES
Pt c/o of the ER nurse left  IV site (#20 L AC) in her arm and has a needle attached to it. IV Site assessed. Pt educated on IV access. The Pt stated the IV has a needle attached to it and wants it out of her arm. IV site d/c'd  per pt request. Tip intact. Pt stated she advised the previous nurse of request to have IV removed and it was not done.

## 2021-09-17 NOTE — DISCHARGE INSTRUCTIONS
Advance Care Planning  People with COVID-19 may have no symptoms, mild symptoms, such as fever, cough, and shortness of breath or they may have more severe illness, developing severe and fatal pneumonia. As a result, Advance Care Planning with attention to naming a health care decision maker (someone you trust to make healthcare decisions for you if you could not speak for yourself) and sharing other health care preferences is important BEFORE a possible health crisis. Please contact your Primary Care Provider to discuss Advance Care Planning. Preventing the Spread of Coronavirus Disease 2019 in Homes and Residential Communities  For the most recent information go to [email or web address]    Prevention steps for People with confirmed or suspected COVID-19 (including persons under investigation) who do not need to be hospitalized  and   People with confirmed COVID-19 who were hospitalized and determined to be medically stable to go home    Your healthcare provider and public health staff will evaluate whether you can be cared for at home. If it is determined that you do not need to be hospitalized and can be isolated at home, you will be monitored by staff from your local or state health department. You should follow the prevention steps below until a healthcare provider or local or state health department says you can return to your normal activities. Stay home except to get medical care  People who are mildly ill with COVID-19 are able to isolate at home during their illness. You should restrict activities outside your home, except for getting medical care. Do not go to work, school, or public areas. Avoid using public transportation, ride-sharing, or taxis. Separate yourself from other people and animals in your home  People: As much as possible, you should stay in a specific room and away from other people in your home.  Also, you should use a separate bathroom, if available. Animals: You should restrict contact with pets and other animals while you are sick with COVID-19, just like you would around other people. Although there have not been reports of pets or other animals becoming sick with COVID-19, it is still recommended that people sick with COVID-19 limit contact with animals until more information is known about the virus. When possible, have another member of your household care for your animals while you are sick. If you are sick with COVID-19, avoid contact with your pet, including petting, snuggling, being kissed or licked, and sharing food. If you must care for your pet or be around animals while you are sick, wash your hands before and after you interact with pets and wear a facemask. Call ahead before visiting your doctor  If you have a medical appointment, call the healthcare provider and tell them that you have or may have COVID-19. This will help the healthcare providers office take steps to keep other people from getting infected or exposed. Wear a facemask  You should wear a facemask when you are around other people (e.g., sharing a room or vehicle) or pets and before you enter a healthcare providers office. If you are not able to wear a facemask (for example, because it causes trouble breathing), then people who live with you should not stay in the same room with you, or they should wear a facemask if they enter your room. Cover your coughs and sneezes  Cover your mouth and nose with a tissue when you cough or sneeze. Throw used tissues in a lined trash can. Immediately wash your hands with soap and water for at least 20 seconds or, if soap and water are not available, clean your hands with an alcohol-based hand  that contains at least 60% alcohol.   Clean your hands often  Wash your hands often with soap and water for at least 20 seconds, especially after blowing your nose, coughing, or sneezing; going to the bathroom; and before eating or preparing food. If soap and water are not readily available, use an alcohol-based hand  with at least 60% alcohol, covering all surfaces of your hands and rubbing them together until they feel dry. Soap and water are the best option if hands are visibly dirty. Avoid touching your eyes, nose, and mouth with unwashed hands. Avoid sharing personal household items  You should not share dishes, drinking glasses, cups, eating utensils, towels, or bedding with other people or pets in your home. After using these items, they should be washed thoroughly with soap and water. Clean all high-touch surfaces everyday  High touch surfaces include counters, tabletops, doorknobs, bathroom fixtures, toilets, phones, keyboards, tablets, and bedside tables. Also, clean any surfaces that may have blood, stool, or body fluids on them. Use a household cleaning spray or wipe, according to the label instructions. Labels contain instructions for safe and effective use of the cleaning product including precautions you should take when applying the product, such as wearing gloves and making sure you have good ventilation during use of the product. Monitor your symptoms  Seek prompt medical attention if your illness is worsening (e.g., difficulty breathing). Before seeking care, call your healthcare provider and tell them that you have, or are being evaluated for, COVID-19. Put on a facemask before you enter the facility. These steps will help the healthcare providers office to keep other people in the office or waiting room from getting infected or exposed. Ask your healthcare provider to call the local or state health department. Persons who are placed under active monitoring or facilitated self-monitoring should follow instructions provided by their local health department or occupational health professionals, as appropriate. When working with your local health department check their available hours.   If you have a medical emergency and need to call 911, notify the dispatch personnel that you have, or are being evaluated for COVID-19. If possible, put on a facemask before emergency medical services arrive. Discontinuing home isolation  Patients with confirmed COVID-19 should remain under home isolation precautions until the risk of secondary transmission to others is thought to be low. The decision to discontinue home isolation precautions should be made on a case-by-case basis, in consultation with healthcare providers and state and local health departments. HOSPITALIST DISCHARGE INSTRUCTIONS    NAME: Armin Sharma   :  1975   MRN:  [id number]     Date/Time:  2021 1:52 PM    ADMIT DATE: 2021     DISCHARGE DATE: 2021     DISCHARGE DIAGNOSIS:  COVID 19 pnuemonia  Right adrenal mass  Right lower quadrant pain    Follow-up Information     Follow up With Specialties Details Why Contact Annie Piper  On 2021 8:40AM  [address] [facility] [address] [facility]  Dede Cerda MD Hematology and Oncology Schedule an appointment as soon as possible for a visit in 1 week  [facility] [place] [address] [id number] [phone]           You need to be on isolation for 10 days from your last positive test and should have no fever for 24 hours and symptoms improvement, after which you can discontinue isolation. MEDICATIONS:  As per medication reconciliation  list  · It is important that you take the medication exactly as they are prescribed. · Keep your medication in the bottles provided by the pharmacist and keep a list of the medication names, dosages, and times to be taken in your wallet. · Do not take other medications without consulting your doctor.      Pain Management: per above medications    What to do at Home    Recommended diet:  Cardiac Diet    Recommended activity: Activity as tolerated    If you have questions regarding the hospital related prescriptions or hospital related issues please call Lamberto Radford at . If you experience any of the following symptoms then please call your primary care physician or return to the emergency room if you cannot get hold of your doctor:  Fever, chills, nausea, vomiting, diarrhea, change in mentation, falling, bleeding, shortness of breath    Follow Up:  Dr. Ruano [place], [place]  you are to call and set up an appointment to see them in 7-10 days. Information obtained by :  I understand that if any problems occur once I am at home I am to contact my physician. I understand and acknowledge receipt of the instructions indicated above.                                                                                                                                            Physician's or R.N.'s Signature                                                                  Date/Time                                                                                                                                              Patient or Representative Signature                                                          Date/Time

## 2021-09-17 NOTE — PROGRESS NOTES
Pt c/o of having to be stuck twice to get labs drawn. Pt is a difficult stick. Completed labs sent to lab. Lab dept notified this nurse recollect needed for 2 tubes and to send on ice. Pt stated OK to recollect. Lab recollect of tubes completed and sent on ice.

## 2021-09-17 NOTE — PROGRESS NOTES
Pt d/c home with family. D/C instructions and follow up appointments reviewed with pt.  Pt states has no questions regarding her d/c

## 2021-09-18 LAB
B2 GLYCOPROT1 IGG SER-ACNC: <9 GPI IGG UNITS (ref 0–20)
B2 GLYCOPROT1 IGM SER-ACNC: <9 GPI IGM UNITS (ref 0–32)
DHEA-S SERPL-MCNC: 36.2 UG/DL (ref 41.2–243.7)

## 2021-09-20 ENCOUNTER — PATIENT OUTREACH (OUTPATIENT)
Dept: CASE MANAGEMENT | Age: 46
End: 2021-09-20

## 2021-09-20 NOTE — PROGRESS NOTES
21 at 3:48pm:  Care Transitions Outreach Attempt    Call within 2 business days of discharge: Yes   Attempted to reach patient for transitions of care follow up. Unable to reach patient. Patient does not have a current PHI form scanned into her chart at this time. Patient: Kelley Ma Patient : 1975 MRN: [id number]    Last Discharge [address]       Complaint Diagnosis Description Type Department Provider    21 Shortness of Breath; Chest Pain Abdominal wall pain in right flank . .. ED to Hosp-Admission (Discharged) (ADMIT) Mayuri López MD; Magui Ashley. .. Was this an external facility discharge? No      Discharge Facility: n/a    Noted following upcoming appointments from discharge chart review:   [address] follow up appointment(s): No future appointments. Non-[facility] follow up appointment(s): Per discharge summary, patient has PCP follow-up appointment scheduled with AN Reilly/PCP, with [facility], on 21. Per discharge summary, patient is to schedule heme/oncology follow-up appointment with Dr. Agatha Dawkins within one week from discharge. 21 at 2:55pm: Patient contacted regarding COVID-19 diagnosis. Discussed COVID-19 related testing which was available at this time. Test results were positive. Patient informed of results, if available? yes. Care Transition Nurse contacted the patient by telephone to perform post discharge assessment. Call within 2 business days of discharge: Yes  Verified name and  with patient as identifiers. Provided introduction to self, and explanation of the CTN/ACM role, and reason for call due to risk factors for infection and/or exposure to COVID-19. Symptoms reviewed with patient who verbalized the following symptoms: no new symptoms and no worsening symptoms      Due to no new or worsening symptoms encounter was not routed to provider for escalation.  Discussed follow-up appointments. If no appointment was previously scheduled, appointment scheduling offered:  yes. [facility] follow up appointment(s): No future appointments. Non-[facility] follow up appointment(s): Patient states she saw AN Mtz for [facility] appointment on 9-21-21. Patient states she will be seeing a [facility] Hematology and Oncology provider in [place], [place] for her heme/oncology follow-up appointment. Patient states AN Mtz made the heme/oncology referral for her today, during her [place] appointment. Patient is unable to provide the name of the heme/oncology provider that she will be seeing. Interventions to address risk factors: Obtained and reviewed discharge summary and/or continuity of care documents and Education of patient/family/caregiver/guardian to support self-management-Education provided regarding signs/symptoms of Covid-19 virus, patient verbalized an understanding. Advance Care Planning:   Does patient have an Advance Directive: not on file; education provided. Educated patient about risk for severe COVID-19 due to risk factors according to CDC guidelines. CTN reviewed discharge instructions, medical action plan and red flag symptoms with the patient who verbalized understanding. Discussed COVID vaccination status: yes. Education provided on COVID-19 vaccination as appropriate. Discussed exposure protocols and quarantine with CDC Guidelines. Patient was given an opportunity to verbalize any questions and concerns and agrees to contact CTN or health care provider for questions related to their healthcare. Reviewed and educated patient on any new and changed medications related to discharge diagnosis. Patient states AN Mtz instructed her to stop taking aspirin 81mg daily, and therefore aspirin was marked as 'Not Taking' on today's medication reconciliation. Was patient discharged with a pulse oximeter? no     CTN provided contact information.  Plan for follow-up in 10-14 days based on severity of symptoms and risk factors.

## 2021-09-22 LAB — RENIN PLAS-CCNC: 2.04 NG/ML/HR (ref 0.17–5.38)

## 2021-09-22 NOTE — ADT AUTH CERT NOTES
Criteria Review   Additional Clinical for Requested Reconsideration for (9/14- 9/17) Denial for inpatient medical necessity         ED Provider Notes 9/14/2021:     Chief Complaint  Patient presents with             Shortness of Breath                          SOB - patient was Covid + 8/27. She was given some medicine but she feels like her SOB is worse. Her sats are 98% in triage.            Chest Pain        HPI: Ashli Sánchez, 48 y.o. female with past medical history as documented below presents to the ED with c/o of      Acute onset of CP and abd pain  Recently diagnosed with COVID  Given Z-damian and steroids  Had 1st covid shot     CTA CHEST W OR W WO CONT  Final Result     1. No evidence of pulmonary embolus. 2.  Scattered peripheral ground glass opacities with spiculation likely  reflecting Covid 19 pneumonia possibly residual        CT ABD PELV W CONT  Final Result  1. Complex right adrenal lesion measuring 4.3 cm. There is internal hypodensity  within the lesion which may reflect presence of blood products or previous  hemorrhage. 2.  Other incidental findings as above. XR CHEST PA LAT  Final Result  New mild peripheral parenchymal opacities could be due to developing Covid  pneumonia.     Diagnosis  Clinical Impression:  1. Pneumonia due to COVID-19 virus   2.          Adrenal hemorrhage [facility])      Internal Medicine 9/14/2021:     Assessment / Plan:  Right lower quadrant/flank abdominal pain POA  Suspected right adrenal hemorrhage POA-hypodense lesion of 4.3 cm on CT   in the setting of recently diagnosed COVID-19 infection with pneumonia POA- 8/27 Covid test positive as outpatient  D-dimer 2.93  CTA chest negative for PE, bilateral Covid pneumonia much improved noted  CT abdomen and pelvis= 4.3 cm right adrenal/adnexal lesion/mass with questionable hemorrhage  proBNP 42  Troponin negative  Potassium 3.4     Admit to remote telemetry bed with COVID-19 restrictions/droplet plus precautions  Patient is not hypoxic and stable from COVID-19 standpoint  Pain management for right lower quadrant abdominal pain  Hemoglobin stable  Inpatient hematology consulted for further recommendation  Hold off home aspirin for now-patient has not been on it for a while after she was discharged on it many months ago for TIA  Replenish potassium p.o., check BMP in a.m. Consider inpatient surgery evaluation if pain continues to worsen, check renal/retroperitoneal ultrasound  Continue to treat supportively for COVID-19 pneumonia        Patient was found to be hemodynamically stable with mild hypokalemia with otherwise unremarkable blood work in the ED, found to have 4.3 cm hypodense lesion in the right adrenal gland on CT imaging. With negative CTA chest for any PE but positive for improving bilateral Covid pneumonia. We were asked to admit for work up and evaluation of the above problems.        Hematology and Oncology MD Consult Note 9/15/2021:     Admitting Diagnosis: Adrenal hemorrhage ([facility] [address] [E27.49]  Abdominal wall pain in right flank [R10.9]  Pneumonia due to COVID-19 virus [U07.1, J12.82]     Discussion: Ashli Sánchez is a  48y.o. year old seen in consultation at the request of Dr. Francesca Franz for adrenal hemorrhage. She has a h/o asthma, TIA, HTN, and recent covid19 vaccine 8/27 and covid19 dx 8/27. She got her first covid19 vaccine on 8/26 and then tested positive for covid on 8/27. She was managed at home but presented today for worsening shortness of breath, cough, and RUQ pain. RUQ pain started 2 days ago, is constant but varies in severity 4-8/10, non-radiating, sharp/squeezing. Labs show WBC 12.7, hb normal, plt 434. D-dimer 2.02. COVID19 detected. On 3/31/21 cardiolipin Ab neg, lupus anticoagulant neg, B2GP negative,  negative for prothrombin gene mutation, FVL negative. She is not sure why those were checked. No h/o bleeding.  Has had many surgeries previously and no bleeding problems. No h/o clotting aside from possible superficial thrombophlebitis that was not confirmed with US. She does have a h/o TIA and has been on ASA in the past, none recently. No blood thinners. No recent trauma. Imaging:  US RP 9/14/21:   Nonspecific complex right adrenal mass. The area that demonstrates  increased echogenicity on the ultrasound is relatively hypodense on CT and may  represent presence of fat. 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 Consider MRI  for characterization. CT A/P 9/14/21: I personally reviewed these images for personal interpretation  1. Complex right adrenal lesion measuring 4.3 cm. There is internal hypodensity  within the lesion which may reflect presence of blood products or previous  Hemorrhage. 2.  Other incidental findings as above. CT Chest 9/14/21:   1. No evidence of pulmonary embolus. 2.  Scattered peripheral ground glass opacities with spiculation likely  reflecting Covid 19 pneumonia possibly residual        General Surgery MD Consult Note 9/16/2021:     Impression:  Right adrenal incidentaloma 4.3cm that does not appear to be an adenoma. Active COVID. Plan:  Biochemical work-up while she's here. Follow-up outpatient once over COVID and pulmonary status good enough for general anesthesia then will plan for robotic right adrenalectomy.          Internal Medicine MD Note 9/16/2021:     Assessment / Plan:  Right lower quadrant/flank abdominal pain POA  Adrenal adenoma:  Recent Covid 19 pneumonia:     Suspected right adrenal hemorrhage POA on CT, however MRI show:  Complex lesion in Rt Adrenal gland, Inconsistent with hemorrhage, may be a colison tumor, with adrenal adenoma and a second lesion.   CTA chest negative for PE, bilateral Covid pneumonia much improved noted  CT abdomen and pelvis= 4.3 cm right adrenal/adnexal lesion/mass with questionable hemorrhage     Patient is not hypoxic and stable from COVID-19 standpoint, still remains positive. Still continues to have right lower quadrant pain, with tenderness on palpation, will get general surgery consult, npo until then  Work up for Adrenal mass ongoing for ACTH, DHEA-S, plasma free metanephrine  Oncology following  Will resume aspirin now as MRI doesn't show hemorrhage  Continue to treat supportively for COVID-19 pneumonia     LABS:  9/14/2021 14:46  WBC: 9.8  NRBC: 0.2 (H)  RBC: 3.80  HGB: 11.8  HCT: 37.2  MCV: 97.9  MCH: 31.1  MCHC: 31.7  RDW: 14.5  PLATELET: 240 (H)  MPV: 8.7 (L)  NEUTROPHILS: 66  LYMPHOCYTES: 22  MONOCYTES: 8  EOSINOPHILS: 1  BASOPHILS: 1  IMMATURE GRANULOCYTES: 2 (H)  DF: AUTOMATED  ABSOLUTE NRBC: 0.02 (H)  ABS. NEUTROPHILS: 6.6  ABS. IMM. GRANS.: 0.2 (H)  ABS. LYMPHOCYTES: 2.2  ABS. MONOCYTES: 0.7  ABS. EOSINOPHILS: 0.1  ABS. BASOPHILS: 0.1  Sodium: 141  Potassium: 3.4 (L)  Chloride: 105  CO2: 33 (H)  Anion gap: 3 (L)  Glucose: 95  BUN: 11  Creatinine: 0.76  BUN/Creatinine ratio: 14  Calcium: 8.3 (L)  GFR est non-AA: >60  GFR est AA: >60  Bilirubin, total: 0.5  Protein, total: 6.5  Albumin: 3.0 (L)  Globulin: 3.5  A-G Ratio: 0.9 (L)  ALT: 38  AST: 13 (L)  Alk.  phosphatase: 62  CK: 37  Troponin-I, Qt.: <0.05  NT pro-BNP: 42     9/14/2021 19:01  HGB: 11.7  HCT: 36.9  D-dimer: 2.93 (H)  COVID-19 RAPID TEST: Rpt (A)     9/15/2021 04:51  D-dimer: 2.02 (H)  Sodium: 138  Potassium: 3.3 (L)  Chloride: 104  CO2: 32  Anion gap: 2 (L)  Glucose: 99  BUN: 12  Creatinine: 0.68  BUN/Creatinine ratio: 18  Calcium: 8.4 (L)  Magnesium: 2.3  GFR est non-AA: >60  GFR est AA: >60  C-Reactive protein: 1.02 (H)  C REACTIVE PROTEIN, QT: Rpt (A)     9/15/2021 11:39  WBC: 12.7 (H)  NRBC: 0.0  RBC: 3.89  HGB: 12.1  HCT: 38.2  MCV: 98.2  MCH: 31.1  MCHC: 31.7  RDW: 14.6 (H)  PLATELET: 275 (H)  MPV: 8.5 (L)  ABSOLUTE NRBC: 0.00     9/16/2021 03:27  SAMPLES BEING HELD: Rpt     9/16/2021 03:53  WBC: 12.3 (H)  NRBC: 0.0  RBC: 3.65 (L)  HGB: 11.2 (L)  HCT: 36.2  MCV: 99.2 (H)  MCH: 30.7  MCHC: 30.9  RDW: 14.7 (H)  PLATELET: 805 (H)  MPV: 8.8 (L)  ABSOLUTE NRBC: 0.00  INR: 1.0  Prothrombin time: 10.2  aPTT: 23.9  Fibrinogen: 395  Sodium: 137  Potassium: 4.3  Chloride: 105  CO2: 29  Anion gap: 3 (L)  Glucose: 87  BUN: 12  Creatinine: 0.64  BUN/Creatinine ratio: 19  Calcium: 8.2 (L)  GFR est non-AA: >60  GFR est AA: >60  Bilirubin, total: 0.5  Protein, total: 5.8 (L)  Albumin: 2.7 (L)  Globulin: 3.1  A-G Ratio: 0.9 (L)  ALT: 27  AST: 12 (L)  Alk. phosphatase: 68  Cortisol, a.m.: 3.1 (L)  LUPUS ANTICOAGULANT PANEL W/ REFLEX: Rpt     9/16/2021 05:18  ACTH: Rpt  CARDIOLIPIN AB PANEL: Rpt  B-2 GLYCOPROTEIN AB, IGG/IGM: Rpt                      Medications 09/14/21 09/15/21 09/16/21       Completed Medications       gadobutroL (GADAVIST) contrast solution 10 mL   Dose: 10 mL  Freq: RAD ONCE Route: IV  Start: 09/15/21 2200 End: 09/15/21 2148    Admin Instructions:   IV bolus at 2 mL/second; flush with NS   Order ID: [id number]      21 (10 mL)                   iopamidoL (ISOVUE-370) 76 % injection 100 mL   Dose: 100 mL  Freq: RAD ONCE Route: IV  Start: 09/14/21 1612 End: 09/14/21 1658   Order ID: [id number]    16 (100 mL)                     ketorolac (TORADOL) injection 30 mg   Dose: 30 mg  Freq: NOW Route: IV  Start: 09/14/21 1608 End: 09/14/21 1610    Admin Instructions:   If giving IV push, give over a minimum of 15 seconds. Order ID: [id number]    94 (30 mg)                     morphine injection 4 mg   Dose: 4 mg  Freq: NOW Route: IV  Start: 09/14/21 1848 End: 09/14/21 1916   Order ID: [id number]    19 (4 mg)                     potassium chloride (K-DUR, KLOR-CON) SR tablet 40 mEq   Dose: 40 mEq  Freq: NOW Route: PO  Start: 09/15/21 1042 End: 09/15/21 1116    Admin Instructions:   Do not crush, break or chew.  Swallow whole.    Order ID: [id number]      11 (40 mEq)                   potassium chloride SR (KLOR-CON 10) tablet 20 mEq   Dose: 20 mEq  Freq: NOW Route: PO  Start: 09/14/21 1945 End: 09/14/21 2043 Admin Instructions:   Do not crush, break or chew.  Swallow whole. Order ID: [id number]    76 (20 mEq)                     Discontinued Medications       Medications 09/14/21 09/15/21 09/16/21       0.9% sodium chloride infusion   Rate: 75 mL/hr Dose: 75 mL/hr  Freq: CONTINUOUS Route: IV  Start: 09/14/21 1945 End: 09/17/21 2233   Order ID: [id number]    17 (75 mL/hr)           07 (75 mL/hr)           04 (75 mL/hr)               acetaminophen (TYLENOL) tablet 650 mg   Dose: 650 mg  Freq: EVERY 6 HOURS AS NEEDED Route: PO  PRN Reasons: Mild Pain,Fever  PRN Comment: For temp greater than 100.4 F (38 C)  Start: 09/14/21 1944 End: 09/17/21 2233    Admin Instructions:       Order ID: [id number]                Or  acetaminophen (TYLENOL) suppository 650 mg   Dose: 650 mg  Freq: EVERY 6 HOURS AS NEEDED Route: RE  PRN Reasons: Mild Pain,Fever  PRN Comment: For temp greater than 100.4 F (38 C). Administer if oral route cannot be used. Start: 09/14/21 1944 End: 09/17/21 2233    Admin Instructions:       Order ID: [id number]                acetaminophen (TYLENOL) tablet 650 mg   Dose: 650 mg  Freq: EVERY 6 HOURS AS NEEDED Route: PO  PRN Reason: Mild Pain  Start: 09/14/21 1854 End: 09/14/21 1949    Admin Instructions:       Order ID: [id number]                albuterol (PROVENTIL HFA, VENTOLIN HFA, PROAIR HFA) inhaler 2 Puff   Dose: 2 Puff  Freq: EVERY 6 HOURS RESP Route: IN  Start: 09/14/21 2000 End: 09/17/21 2233    Admin Instructions:   Shake well before each inhalation. Order specific questions:   MODE OF DELIVERY Inline  Initiate RT Bronchodilator Protocol Yes   Order ID: [id number]    21 (2 Puff)           02 (2 Puff)    07 (2 Puff)    13 (2 Puff)      23 (2 Puff)           03 (2 Puff)    09 (2 Puff)    13 (2 Puff)      21 (2 Puff)              amLODIPine (NORVASC) tablet 10 mg   Dose: 10 mg  Freq: DAILY Route: PO  Start: 09/15/21 0900 End: 09/17/21 6629    Admin Instructions:   .     Order ID: [id number]      08 (10 mg)           08 (10 mg)                ascorbic acid (vitamin C) (VITAMIN C) tablet 1,000 mg   Dose: 1,000 mg  Freq: DAILY Route: PO  Start: 09/15/21 0900 End: 09/17/21 2233   Order ID: [id number]      08 (1,000 mg)           08 (1,000 mg)                aspirin chewable tablet 81 mg   Dose: 81 mg  Freq: DAILY Route: PO  Start: 09/17/21 0900 End: 09/17/21 2233   Order ID: [id number]                atorvastatin (LIPITOR) tablet 80 mg   Dose: 80 mg  Freq: EVERY BEDTIME Route: PO  Start: 09/14/21 2200 End: 09/17/21 2233   Order ID: [id number]    23 (80 mg)           22 (80 mg)           21 (80 mg)               butalbital-acetaminophen-caffeine (FIORICET, ESGIC) -40 mg per tablet 1 Tablet   Dose: 1 Tablet  Freq: EVERY 6 HOURS AS NEEDED Route: PO  PRN Reasons: Coy Steele  Start: 09/14/21 1944 End: 09/17/21 2233    Admin Instructions:       Order ID: [id number]                cyclobenzaprine (FLEXERIL) tablet 10 mg   Dose: 10 mg  Freq: 3 TIMES DAILY AS NEEDED Route: PO  PRN Reason: Muscle Spasm(s)  Start: 09/14/21 1944 End: 09/17/21 2233   Order ID: [id number]                escitalopram oxalate (LEXAPRO) tablet 10 mg   Dose: 10 mg  Freq: DAILY WITH DINNER Route: PO  Start: 09/15/21 1700 End: 09/17/21 2233   Order ID: [id number]      17 (10 mg)           17 (10 mg)                fluticasone propionate (FLONASE) 50 mcg/actuation nasal spray 2 Spray   Dose: 2 Spray  Freq: DAILY Route: BOTH NOSTRIL  Start: 09/15/21 0900 End: 09/17/21 2233    Admin Instructions:   2 sprays in each nostrils daily   Order ID: [id number]      09 (2 Spray)           08 (2 Spray)                furosemide (LASIX) tablet 20 mg   Dose: 20 mg  Freq: DAILY Route: PO  Start: 09/15/21 0900 End: 09/17/21 2233   Order ID: [id number]      08 (20 mg)           08 (20 mg)                guaiFENesin ER (MUCINEX) tablet 600 mg   Dose: 600 mg  Freq: EVERY 12 HOURS Route: PO  Start: 09/14/21 2100 End: 09/17/21 2234    Admin Instructions:   Do not crush, break or chew.  Swallow whole. Order ID: [id number]    72 (600 mg)           08 (600 mg)    22 (600 mg)         08 (600 mg)    21 (600 mg)             levothyroxine (SYNTHROID) tablet 300 mcg   Dose: 300 mcg  Freq: 6AM Route: PO  Start: 09/15/21 0600 End: 09/17/21 2233    Admin Instructions: Take with water on an empty stomach. Order ID: [id number]      07 (300 mcg)           05 (300 mcg)                morphine injection 1 mg   Dose: 1 mg  Freq: EVERY 4 HOURS AS NEEDED Route: IV  PRN Reason: Severe Pain  Start: 09/14/21 1944 End: 09/17/21 2233   Order ID: [id number]      08 (1 mg)    17 (1 mg)                 morphine injection 4 mg   Dose: 4 mg  Freq: EVERY 4 HOURS AS NEEDED Route: IV  PRN Reasons: Moderate Pain,Severe Pain  Start: 09/14/21 1854 End: 09/14/21 1944   Order ID: [id number]                ondansetron (ZOFRAN ODT) tablet 4 mg   Dose: 4 mg  Freq: EVERY 8 HOURS AS NEEDED Route: PO  PRN Reasons: Nausea,Vomiting  Start: 09/14/21 1944 End: 09/17/21 2233   Order ID: [id number]                         Or  ondansetron (ZOFRAN) injection 4 mg   Dose: 4 mg  Freq: EVERY 6 HOURS AS NEEDED Route: IV  PRN Reasons: Nausea,Vomiting  Start: 09/14/21 1944 End: 09/17/21 2233    Admin Instructions:   Administer if oral route cannot be used. Order ID: [id number]      09 (4 mg)                   ondansetron (ZOFRAN) injection 4 mg   Dose: 4 mg  Freq: EVERY 4 HOURS AS NEEDED Route: IV  PRN Reason: Nausea or Vomiting  Start: 09/14/21 1854 End: 09/14/21 1949    Admin Instructions:   Administer 4 to 7 mg IV over 30 seconds. Administer 8mg IV over 60 seconds. Administer doses greater than 8mg IV over at least 2 minutes. Order ID: [id number]                oxyCODONE-acetaminophen (PERCOCET) 5-325 mg per tablet 1 Tablet   Dose: 1 Tablet  Freq: EVERY 6 HOURS AS NEEDED Route: PO  PRN Reason:  Moderate Pain  Start: 09/14/21 1944 End: 09/17/21 2233   Order ID: [id number]    23 (1 Tablet)           11 (1 Tablet)           04 (1 Tablet)    15 (1 Tablet)    22 (1 Tablet)         polyethylene glycol (MIRALAX) packet 17 g   Dose: 17 g  Freq: DAILY PRN Route: PO  PRN Reason: Constipation  Start: 09/14/21 1944 End: 09/17/21 2233    Admin Instructions:   First line therapy for constipation   Order ID: [id number]                potassium chloride SR (KLOR-CON 10) tablet 10 mEq   Dose: 10 mEq  Freq: DAILY Route: PO  Start: 09/15/21 0900 End: 09/17/21 2233    Admin Instructions:   Do not crush, break or chew.  Swallow whole. Order ID: [id number]      08 (10 mEq)           08 (10 mEq)                propranoloL (INDERAL) tablet 20 mg   Dose: 20 mg  Freq: 2 TIMES DAILY Route: PO  Start: 09/15/21 0900 End: 09/17/21 2233    Admin Instructions:   . Order ID: [id number]      09 (20 mg)    18 (20 mg)         08 (20 mg)    17 (20 mg)              sodium chloride (NS) flush 5-40 mL   Dose: 5-40 mL  Freq: AS NEEDED Route: IV  PRN Reason: Line Patency  Start: 09/14/21 1944 End: 09/17/21 2233    Admin Instructions:   If following IV push medication, administer flush at the same rate as the IV push. Flush volume is determined by type of infusion therapy being given. For non-viscous solutions use Peripheral IV = 5 mL; Midline or Central Line = 10 mL/lumen. For viscous solutions (i.e. blood components, parenteral nutrition, contrast media, or after obtaining blood sample) use Peripheral IV= 10 mL; Midline or Central Line = 20 mL/lumen. Order ID: [id number]                sodium chloride (NS) flush 5-40 mL   Dose: 5-40 mL  Freq: EVERY 8 HOURS Route: IV  Start: 09/14/21 2200 End: 09/17/21 2233    Admin Instructions: For Line Patency: Peripheral IV = 5 mL; Midline or Central Line = 10 mL/lumen.    Order ID: [id number]    25 (10 mL)           06    14 (10 mL)    22 (10 mL)       05 (10 mL)    14 (10 mL)    22 (10 mL)          zinc sulfate (ZINCATE) 50 mg zinc (220 mg) capsule 1 Capsule   Dose: 1 Capsule  Freq: DAILY Route: PO  Start: 09/15/21 0900 End: 09/17/21 2233    Admin Instructions:   220mg zinc sulfate = 50mg elemental zinc.   Order ID: [id number]      08 (1 Capsule)           08 (1 Capsule)                Medications 09/14/21 09/15/21 09/16/21                                  Abdominal Pain, Undiagnosed - Care Day 3 (9/16/2021) by Juana Farrar RN       Review Status Review Entered   Completed 9/17/2021 09:57      Criteria Review      Care Day: 3 Care Date: 9/16/2021 Level of Care: Inpatient Floor    Guideline Day 2    Clinical Status    (X) * Hemodynamic stability    9/17/2021 09:57:09 EDT by Bianca Nyhan, Miranda      VS 97.8 60 100/62 18 96% ROOM AIR    (X) * Pain absent or managed    9/17/2021 09:57:09 EDT by Marino Bolanos      Percocet 5-325mg po q6 prn x3    ( ) * Etiology or finding requiring inpatient treatment absent    (X) * Liquid or advanced diet tolerated    9/17/2021 09:57:09 EDT by Bianca Nyhan, Miranda      npo until seen by surgery then regular diet low fat/low chol/high fiber/YAZAN    ( ) * Discharge plans and education understood    Activity    (X) * Ambulatory or acceptable for next level of care    9/17/2021 09:57:09 EDT by Bianca Nyhan, Miranda      up ad hemanth    Routes    (X) * Oral hydration    9/17/2021 09:57:09 EDT by Marino Bolanos      regular diet low fat/low chol/high fiber/YAZAN    (X) * Oral medications or regimen acceptable for next level of care    9/17/2021 09:57:09 EDT by Bianca Nyhan, Miranda      Lasix 20mg po qday  Mucinex 600mg po bid  Synthroid 300mcg po qday  Percocet 5-325mg po q6 prn x3  Potassium 10meq po qday  Inderal 20mg po bid  Zinc 1 cap po qday  Vitamin c 1000mg po qday  Lipitor 80mg po qhs  Lexapro 10mg po qday    (X) * Oral diet or acceptable for next level of care    (X) Liquid or advanced diet    * Milestone   Additional Notes   9/16/2021   LOC IP MEDICAL    60 100/62 18 96% ROOM AIR   LABS     WBC: 12.3 (H)   NRBC: 0.0   RBC: 3.65 (L)   HGB: 11.2 (L)   HCT: 36.2   MCV: 99.2 (H)   MCH: 30.7   MCHC: 30.9   RDW: 14.7 (H)   PLATELET: 910 (H)   Anion gap: 3 (L)   Glucose: 87   BUN: 12   Creatinine: 0.64   BUN/Creatinine ratio: 19   Calcium: 8.2 (L)   GFR est non-AA: >60   GFR est AA: >60   Bilirubin, total: 0.5   Protein, total: 5.8 (L)   Albumin: 2.7 (L)   Globulin: 3.1   A-G Ratio: 0.9 (L)   ALT: 27   AST: 12 (L)   Alk. phosphatase: 68   Cortisol, a.m.: 3.1 (L)      MEDS   NS @ 75 cc/hr   Albuterol 2 puff q6   Flonase 2 sprays both nostrils bid      ATTENDING NOTE   Right lower quadrant/flank abdominal pain POA   Adrenal adenoma:   Recent Covid 19 pneumonia:       Suspected right adrenal hemorrhage POA on CT, however MRI show:   Complex lesion in Rt Adrenal gland, Inconsistent with hemorrhage, may be a colison tumor, with adrenal adenoma and a second lesion. CTA chest negative for PE, bilateral Covid pneumonia much improved noted   CT abdomen and pelvis= 4.3 cm right adrenal/adnexal lesion/mass with questionable hemorrhage       Patient is not hypoxic and stable from COVID-19 standpoint, still remains positive.    Still continues to have right lower quadrant pain, with tenderness on palpation, will get general surgery consult, npo until then   Work up for Adrenal mass ongoing for ACTH, DHEA-S, plasma free metanephrine   Oncology following   Will resume aspirin now as MRI doesn't show hemorrhage   Continue to treat supportively for COVID-19 pneumonia   Acquired hypothyroidism status post thyroid cancer status post surgery POA   Continue home Synthroid dose at 300 mcg daily       Hypertension   Continue amlodipine, propranolol   Holding chlorthalidone for now       Hyperlipidemia   Continue Lipitor       Anxiety disorder   Continue Lexapro       Code Status: Full code       DVT Prophylaxis: SCDs for now   GI Prophylaxis: not indicated       Baseline: Patient is independent with ADLs at home   F/u for right adrenal mass   She continues to have right illiac fossa pain, with some tenderness on palpation     HEMATOLOGY NOTE   Interval history: Breathing is improved today. She does still have RLQ pain, which is her main complaint. Cough improving. No fevers or chills. No headaches. No severe HTN episodes. Connie Herron is a  48 y.o. year old seen in consultation at the request of Dr. 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 She has a h/o asthma, TIA, HTN, and recent covid19 vaccine 8/27 and covid19 dx 8/27. She was found to have an incidental adrenal mass 3.8cm in size, with concern for collision tumor. Currently undergoing workup.        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    -Workup for adrenal mass to include: ACTH, DHEA-S, 1mg dexamethasone suppression test, aldosterone, renin, plasma free metanephrine   -will plan to perform 1mg dexa suppression test once pheo has been ruled out.   -Will need biopsy or surgery consult in the future but will order hormone tests first as would not want to biopsy the mass without knowing if it is a pheo first. Gen surgery was consulted today.        Adrenal hemorrhage-not present on MRI    Based on MRI there is no component of hemorrhage. This was suspected on CT scan but was not confirmed on MRI.       GENERAL SURGERY CONSULT   Impression:   Right adrenal incidentaloma 4.3cm that does not appear to be an adenoma.     Active COVID.         Plan:   Biochemical work-up while she's here.     Follow-up outpatient once over COVID and pulmonary status good enough for general anesthesia then will plan for robotic right adrenalectomy.           Abdominal Pain, Undiagnosed - Care Day 2 (9/15/2021) by Ashok Mendoza       Review Status Review Entered   Completed 9/15/2021 15:16      Criteria Review      Care Day: 2 Care Date: 9/15/2021 Level of Care: Inpatient Floor    Guideline Day 2 Clinical Status    (X) * Hemodynamic stability    9/15/2021 15:16:18 EDT by Gómez Mcnamara      98.1, 53, 103/55, 12, RA sat 95%. CRP 1.02. WBC 12.7, plt 434. D dimer 2.02. K 3.3, AG 2, sherri 8.4.    ( ) * Pain absent or managed    9/15/2021 15:16:18 EDT by Gómez Mcnamara      MRI of abd w/contrast pending    ( ) * Etiology or finding requiring inpatient treatment absent    9/15/2021 15:16:18 EDT by Gómez Mcnamara      MRI of abd pending    ( ) * Liquid or advanced diet tolerated    ( ) * Discharge plans and education understood    Activity    ( ) * Ambulatory or acceptable for next level of care    9/15/2021 15:16:18 EDT by Shivam howe ad hemanth. Droplet plus isolation    Routes    ( ) * Oral hydration    9/15/2021 15:16:18 EDT by Gómez Mcnamara      NS at 75cc/hr.  ADD MEDS: Inderal 20mg po bid, lexapro 10mg po qd, zinc sulfate 220mg po qd, kcl 40meq po x1 and 10meq po qd    ( ) * Oral medications or regimen acceptable for next level of care    9/15/2021 15:16:18 EDT by Gómez Mcnamara      morphine 1mg IV Q4H PRN x1, zofran 4mg IV Q6H PRN x1, percocet 1tab po q6H PRN x1, Proventil 2puff INH Q6H, norvasc 10mg po qd, vit C 1000mg po qd, lipitor 80mg po qhs, lasix 20mg po qd, mucinex ER 600mg po q12H, synthroid 300mcg po qd    ( ) * Oral diet or acceptable for next level of care    (X) Liquid or advanced diet    9/15/2021 15:16:18 EDT by Gómez Mcnamara      Regular low fat/low chol/high fiber/2gram Na diet    * Milestone   Additional Notes   Assessment / Plan:   Right lower quadrant/flank abdominal pain POA   Suspected right adrenal hemorrhage POA-hypodense lesion of 4.3 cm on CT    in the setting of recently diagnosed COVID-19 infection with pneumonia POA- 8/27 Covid test positive as outpatient       CTA chest negative for PE, bilateral Covid pneumonia much improved noted   CT abdomen and pelvis= 4.3 cm right adrenal/adnexal lesion/mass with questionable hemorrhage       Patient is not hypoxic and stable from COVID-19 standpoint, still remains positive. Pain management for right lower quadrant abdominal pain   Hemoglobin stable   Inpatient hematology consulted for further recommendation   Hold off home aspirin for now-patient has not been on it for a while after she was discharged on it many months ago for TIA   USG reviewed, will get MRI for further evaluation of adrenal mass   Continue to treat supportively for COVID-19 pneumonia       Hypokalemia:   Repleted, will repeat in AM       Acquired hypothyroidism status post thyroid cancer status post surgery POA   Continue home Synthroid dose at 300 mcg daily       Hypertension   Continue amlodipine, propranolol   Holding chlorthalidone for now       Hyperlipidemia   Continue Lipitor       Anxiety disorder   Continue Lexapro      Physical Exam:    GI:                   Right illiac fossa minimal tenderness      Abdominal Pain, Undiagnosed - Care Day 1 (9/14/2021) by Poncho Betancur       Review Status Review Entered   Completed 9/15/2021 09:19      Criteria Review      Care Day: 1 Care Date: 9/14/2021 Level of Care: Inpatient Floor    Guideline Day 1    Level Of Care    (X) Floor    9/15/2021 09:19:40 EDT by Cinthia, 1 [facility]. DROPLET PLUS ISOLATION    Clinical Status    (X) * Clinical Indications met    9/15/2021 09:19:40 EDT by Shivam Harman 46yr old female to ED c/o rt flank/RLQ abd pain and worsening SOB. Pt dx COVID+ on 8/27 with OP management. Rapid COVID still POSITIVE today. 98.7, 54, 163/98, 18, RA sat 95%. 123.9kg    Activity    (X) Activity as tolerated    9/15/2021 09:19:40 EDT by Shivam Harman up ad hemanth.  SCDs while in bed    Routes    ( ) NPO    9/15/2021 09:19:40 EDT by Gómez Mcnamara      Reg low fat/low chol/high fiber/2gram Na diet    (X) IV fluids    9/15/2021 09:19:40 EDT by Gómez Mcnamara      NS at 75cc/hr    ( ) IV medications    9/15/2021 09:19:40 EDT by Gómez Mcnamara      kcl 20meq po x1, mucinex ER 600mg po q2H, lipitor 80mg po qhs, proventil 2puff INH Q6H    Interventions    (X) CBC, chemistries, urinalysis, pregnancy test    9/15/2021 09:19:40 EDT by Nishant Berger      Plt 452. D dimer 2.93. K 3.4, CO2 33, Ag 3, sherri 8.3, alb 3.0, ast 13. Rapid COVID 19 POSITIVE. 12 lead EKG: NSR, rate 72. CXR: new mild periperhal parenchymal opacities could be due to develpoing COVID 19 pneumonia    ( ) Surgical consultation    9/15/2021 09:19:40 EDT by Nupur Aaron to Hematology pending    (X) Possible endoscopy, ultrasound, CT scan, MRI, repeat ECG    9/15/2021 09:19:40 EDT by Nishant Berger      CTA chest: complex rt adrenal lesion 4.3cm. Internal hypodensity w/i lesion may ref presence of bld or bld products/prev hemorrhage. CTA chest: No PE.  Scattered peripher ground glass opacities ref COVID 19 pna residual. US rt adr mass 4.2x4.2x3cm    Medications    (X) Oral or parenteral analgesics    9/15/2021 09:19:40 EDT by Nishant Berger      Morphine 4mg IV x1, toradol 30mg IV x1, percocet 1tab po q6H PRN x1    * Milestone   Additional Notes   Assessment / Plan:   Right lower quadrant/flank abdominal pain POA   Suspected right adrenal hemorrhage POA-hypodense lesion of 4.3 cm on CT    in the setting of recently diagnosed COVID-19 infection with pneumonia POA- 8/27 Covid test positive as outpatient   D-dimer 2.93   CTA chest negative for PE, bilateral Covid pneumonia much improved noted   CT abdomen and pelvis= 4.3 cm right adrenal/adnexal lesion/mass with questionable hemorrhage   proBNP 42   Troponin negative   Potassium 3.4       Admit to remote telemetry bed with COVID-19 restrictions/droplet plus precautions   Patient is not hypoxic and stable from COVID-19 standpoint   Pain management for right lower quadrant abdominal pain   Hemoglobin stable   Inpatient hematology consulted for further recommendation   Hold off home aspirin for now-patient has not been on it for a while after she was discharged on it many months ago for TIA   Replenish potassium p.o., check BMP in a.m. Consider inpatient surgery evaluation if pain continues to worsen, check renal/retroperitoneal ultrasound   Continue to treat supportively for COVID-19 pneumonia       Acquired hypothyroidism status post thyroid cancer status post surgery POA   Continue home Synthroid dose at 300 mcg daily       Hypertension   Continue amlodipine, propranolol   Holding chlorthalidone for now       Hyperlipidemia   Continue Lipitor       Anxiety disorder   Continue Lexapro      PHYSICAL EXAM:    General:          Alert, cooperative, no distress, appears stated age.      HEENT:           Atraumatic, anicteric sclerae, pink conjunctivae                           No oral ulcers, mucosa moist, throat clear, dentition fair   Neck:               Supple, symmetrical,  thyroid: non tender   Lungs:             Clear to auscultation bilaterally.  No Wheezing or Rhonchi. No rales. Chest wall:      No tenderness  No Accessory muscle use. Heart:              Regular  rhythm,  No  murmur   No edema   Abdomen:        Soft mild right lower quadrant/flank tenderness noted+. Not distended.  Bowel sounds normal   Extremities:     No cyanosis.  No clubbing,                             Skin turgor normal, Capillary refill normal, Radial dial pulse 2+   Skin:                Not pale.  Not Jaundiced  No rashes    Psych:             Good insight.  Not depressed.  Not anxious or agitated. Neurologic:      EOMs intact. No facial asymmetry. No aphasia or slurred speech. Symmetrical strength, Sensation grossly intact.  Alert and oriented X 4      Abdominal Pain, Undiagnosed - Clinical Indications for Admission to Inpatient Care by Marioal Hurley       Review Status Review Entered   Completed 9/15/2021 09:10      Criteria Review      Clinical Indications for Admission to Inpatient Care    Most Recent : Alyce Fung Most Recent Date: 9/15/2021 09:10:08 EDT    (X) Admission is indicated for  1 or more  of the following  (1) (2) (3) (4):       (X) Identification of etiology or finding that requires inpatient care (eg, aortic dissection,       bowel perforation, bowel ischemia, visceral organ torsion, intestinal obstruction) (5)       (6)       9/15/2021 09:10:08 EDT by Junior Wayne         suspected rt adrenal hemorrhage with hypodense lesion of 4.3cm on CT abd/pelvis: 4.3cm rt adresnal/adnexal lesion/mass with questionable hemorrhage    Notes:    9/15/2021 09:10:08 EDT by Junior Wayne    Subject: Additional Clinical Information      * 46yr old female to ED c/o RLQ/rt sided flank abd pain, SOB. Pt also diagnosed with COVID 19 on 8/27 and sent home on oral meds-rapid COVID still POSITIVE today. Imaging c/w possible adrenal hemorrhage in the setting of improving COVID pneumonia. Admitted.

## 2021-09-23 LAB — ALDOST SERPL-MCNC: 2.5 NG/DL (ref 0–30)

## 2021-09-27 LAB
METANEPH FREE SERPL-MCNC: 24 PG/ML (ref 0–88)
NORMETANEPHRINE SERPL-MCNC: 121.7 PG/ML (ref 0–125.8)

## 2022-03-18 PROBLEM — U07.1 PNEUMONIA DUE TO COVID-19 VIRUS: Status: ACTIVE | Noted: 2021-09-14

## 2022-03-18 PROBLEM — I65.23 BILATERAL CAROTID ARTERY STENOSIS: Status: ACTIVE | Noted: 2020-11-07

## 2022-03-18 PROBLEM — G45.1 TRANSIENT ISCHEMIC ATTACK INVOLVING RIGHT INTERNAL CAROTID ARTERY: Status: ACTIVE | Noted: 2020-11-07

## 2022-03-18 PROBLEM — J12.82 PNEUMONIA DUE TO COVID-19 VIRUS: Status: ACTIVE | Noted: 2021-09-14

## 2022-03-18 PROBLEM — E27.49 ADRENAL HEMORRHAGE (HCC): Status: ACTIVE | Noted: 2021-09-14

## 2022-03-19 PROBLEM — Z92.82 S/P ADMN TPA IN DIFF FAC W/N LAST 24 HR BEF ADM TO CRNT FAC: Status: ACTIVE | Noted: 2020-11-05

## 2022-03-19 PROBLEM — I63.9 CVA (CEREBRAL VASCULAR ACCIDENT) (HCC): Status: ACTIVE | Noted: 2020-11-05

## 2022-03-19 PROBLEM — G44.209 TENSION VASCULAR HEADACHE: Status: ACTIVE | Noted: 2020-11-07

## 2022-03-19 PROBLEM — R10.9 ABDOMINAL WALL PAIN IN RIGHT FLANK: Status: ACTIVE | Noted: 2021-09-14

## 2022-12-06 ENCOUNTER — APPOINTMENT (OUTPATIENT)
Dept: ULTRASOUND IMAGING | Age: 47
End: 2022-12-06
Attending: PHYSICIAN ASSISTANT

## 2022-12-06 ENCOUNTER — APPOINTMENT (OUTPATIENT)
Dept: CT IMAGING | Age: 47
End: 2022-12-06
Attending: PHYSICIAN ASSISTANT

## 2022-12-06 ENCOUNTER — HOSPITAL ENCOUNTER (EMERGENCY)
Age: 47
Discharge: HOME | End: 2022-12-06
Attending: EMERGENCY MEDICINE

## 2022-12-06 VITALS
OXYGEN SATURATION: 97 % | HEART RATE: 87 BPM | TEMPERATURE: 99.9 F | WEIGHT: 233.25 LBS | SYSTOLIC BLOOD PRESSURE: 146 MMHG | RESPIRATION RATE: 16 BRPM | DIASTOLIC BLOOD PRESSURE: 93 MMHG | HEIGHT: 66 IN | BODY MASS INDEX: 37.49 KG/M2

## 2022-12-06 DIAGNOSIS — K44.9 HIATAL HERNIA: ICD-10-CM

## 2022-12-06 DIAGNOSIS — R10.31 ABDOMINAL PAIN, RIGHT LOWER QUADRANT: Primary | ICD-10-CM

## 2022-12-06 DIAGNOSIS — N83.202 CYST OF LEFT OVARY: ICD-10-CM

## 2022-12-06 DIAGNOSIS — R91.1 PULMONARY NODULE: ICD-10-CM

## 2022-12-06 LAB
ALBUMIN SERPL-MCNC: 4.2 G/DL (ref 3.5–5)
ALBUMIN/GLOB SERPL: 1 {RATIO} (ref 1.1–2.2)
ALP SERPL-CCNC: 79 U/L (ref 45–117)
ALT SERPL-CCNC: 27 U/L (ref 12–78)
ANION GAP SERPL CALC-SCNC: 10 MMOL/L (ref 5–15)
APPEARANCE UR: CLEAR
AST SERPL-CCNC: 23 U/L (ref 15–37)
BACTERIA URNS QL MICRO: NEGATIVE /HPF
BILIRUB SERPL-MCNC: 0.6 MG/DL (ref 0.2–1)
BILIRUB UR QL: NEGATIVE
BUN SERPL-MCNC: 14 MG/DL (ref 6–20)
BUN/CREAT SERPL: 13 (ref 12–20)
CALCIUM SERPL-MCNC: 9.8 MG/DL (ref 8.5–10.1)
CHLORIDE SERPL-SCNC: 104 MMOL/L (ref 97–108)
CO2 SERPL-SCNC: 23 MMOL/L (ref 21–32)
COLOR UR: ABNORMAL
CREAT SERPL-MCNC: 1.09 MG/DL (ref 0.55–1.02)
EPITH CASTS URNS QL MICRO: ABNORMAL /LPF
ERYTHROCYTE [DISTWIDTH] IN BLOOD BY AUTOMATED COUNT: 14.1 % (ref 11.5–14.5)
GLOBULIN SER CALC-MCNC: 4.3 G/DL (ref 2–4)
GLUCOSE SERPL-MCNC: 96 MG/DL (ref 65–100)
GLUCOSE UR STRIP.AUTO-MCNC: NEGATIVE MG/DL
HCT VFR BLD AUTO: 40.8 % (ref 35–47)
HGB BLD-MCNC: 13.8 G/DL (ref 11.5–16)
HGB UR QL STRIP: NEGATIVE
HYALINE CASTS URNS QL MICRO: ABNORMAL /LPF (ref 0–2)
KETONES UR QL STRIP.AUTO: NEGATIVE MG/DL
LEUKOCYTE ESTERASE UR QL STRIP.AUTO: ABNORMAL
LIPASE SERPL-CCNC: 162 U/L (ref 73–393)
MCH RBC QN AUTO: 31.1 PG (ref 26–34)
MCHC RBC AUTO-ENTMCNC: 33.8 G/DL (ref 30–36.5)
MCV RBC AUTO: 91.9 FL (ref 80–99)
NITRITE UR QL STRIP.AUTO: NEGATIVE
NRBC # BLD: 0 K/UL (ref 0–0.01)
NRBC BLD-RTO: 0 PER 100 WBC
PH UR STRIP: 6.5 [PH] (ref 5–8)
PLATELET # BLD AUTO: 421 K/UL (ref 150–400)
PMV BLD AUTO: 9 FL (ref 8.9–12.9)
POTASSIUM SERPL-SCNC: 4.1 MMOL/L (ref 3.5–5.1)
PROT SERPL-MCNC: 8.5 G/DL (ref 6.4–8.2)
PROT UR STRIP-MCNC: NEGATIVE MG/DL
RBC # BLD AUTO: 4.44 M/UL (ref 3.8–5.2)
RBC #/AREA URNS HPF: ABNORMAL /HPF (ref 0–5)
SODIUM SERPL-SCNC: 137 MMOL/L (ref 136–145)
SP GR UR REFRACTOMETRY: 1.02
UA: UC IF INDICATED,UAUC: ABNORMAL
UROBILINOGEN UR QL STRIP.AUTO: 1 EU/DL (ref 0.2–1)
WBC # BLD AUTO: 5.7 K/UL (ref 3.6–11)
WBC URNS QL MICRO: ABNORMAL /HPF (ref 0–4)

## 2022-12-06 PROCEDURE — 36415 COLL VENOUS BLD VENIPUNCTURE: CPT

## 2022-12-06 PROCEDURE — 81001 URINALYSIS AUTO W/SCOPE: CPT

## 2022-12-06 PROCEDURE — 85027 COMPLETE CBC AUTOMATED: CPT

## 2022-12-06 PROCEDURE — 76856 US EXAM PELVIC COMPLETE: CPT

## 2022-12-06 PROCEDURE — 80053 COMPREHEN METABOLIC PANEL: CPT

## 2022-12-06 PROCEDURE — 74011250636 HC RX REV CODE- 250/636: Performed by: PHYSICIAN ASSISTANT

## 2022-12-06 PROCEDURE — 76830 TRANSVAGINAL US NON-OB: CPT

## 2022-12-06 PROCEDURE — 83690 ASSAY OF LIPASE: CPT

## 2022-12-06 PROCEDURE — 99284 EMERGENCY DEPT VISIT MOD MDM: CPT

## 2022-12-06 PROCEDURE — 74176 CT ABD & PELVIS W/O CONTRAST: CPT

## 2022-12-06 RX ADMIN — SODIUM CHLORIDE 1000 ML: 9 INJECTION, SOLUTION INTRAVENOUS at 09:28

## 2022-12-06 NOTE — ED PROVIDER NOTES
EMERGENCY DEPARTMENT HISTORY AND PHYSICAL EXAM      Pt Name: Mylene Faustin  MRN: [id number]  [place] 1975  Date of evaluation: 12/6/2022  Provider: BROCK Alcantara   Attending: Donavon Ray MD   PCP: BROCK Armendariz  Note Started: 8:14 AM     History of Presenting Illness     Chief Complaint   Patient presents with    Abdominal Pain     Patient started with abdominal pain 1 month ago. Patient states the pain is worse on the lower sides of her abdomen and not the middle. She also has some flank pain       History Provided By: Patient    HPI: Mylene Faustin, 50 y.o. female with past medical history as documented below, presents by POV to the ED with cc of RLQ abdominal pain. The pain started 1 month ago after eating a turkey and cheese sandwich from [facility]. She had some nausea, vomiting, and diarrhea. She figured that it was just some food intolerance and would resolve but symptoms have not abated. Her fever has been intermittent. The pain is constant. It is not radiating. She last vomited this morning. Her last episode of diarrhea was yesterday. There are no vaginal or urinary complaints. She has a history of a prior adrenal gland surgery, tubal ligation, and cholecystectomy. There is been no treatment for pain prior to arrival.    There are no other complaints, changes, or physical findings at this time. PCP: BROCK Armendariz    No current facility-administered medications on file prior to encounter. Current Outpatient Medications on File Prior to Encounter   Medication Sig Dispense Refill    albuterol (PROVENTIL HFA, VENTOLIN HFA, PROAIR HFA) 90 mcg/actuation inhaler Take 2 Puffs by inhalation every six (6) hours as needed for Wheezing. 18 g 1    propranoloL (INDERAL) 20 mg tablet Take 20 mg by mouth two (2) times a day. furosemide (LASIX) 20 mg tablet Take 20 mg by mouth daily.       fluticasone propionate (FLONASE) 50 mcg/actuation nasal spray USE 2 SPRAY(S) IN EACH NOSTRIL ONCE DAILY      fluticasone propion-salmeteroL (ADVAIR/WIXELA) 100-50 mcg/dose diskus inhaler       chlorthalidone (HYGROTON) 25 mg tablet Take 25 mg by mouth daily. potassium chloride SR (KLOR-CON 10) 10 mEq tablet Take 1 Tablet by mouth daily. 30 Tablet 0    amLODIPine (NORVASC) 10 mg tablet Take 1 Tab by mouth daily. 30 Tab 2    aspirin 81 mg chewable tablet Take 1 Tab by mouth daily. (Patient not taking: Reported on 9/21/2021) 30 Tab 2    atorvastatin (LIPITOR) 80 mg tablet Take 1 Tab by mouth nightly. 30 Tab 2    butalbital-acetaminophen-caffeine (FIORICET, ESGIC) -40 mg per tablet Take 1 Tab by mouth every six (6) hours as needed for Headache or Migraine. 20 Tab 0    escitalopram oxalate (LEXAPRO) 10 mg tablet Take 1 Tab by mouth daily (with dinner). (Patient not taking: Reported on 12/6/2022) 30 Tab 2    levothyroxine (SYNTHROID) 300 mcg tablet Take  by mouth Daily (before breakfast). naproxen (NAPROSYN) 500 mg tablet Take 1 Tab by mouth every twelve (12) hours as needed for Pain. 20 Tab 0    cyclobenzaprine (FLEXERIL) 10 mg tablet Take 1 Tab by mouth three (3) times daily as needed for Muscle Spasm(s). 20 Tab 0       Past History     Past Medical History:  Past Medical History:   Diagnosis Date    HTN (hypertension)        Past Surgical History:  Past Surgical History:   Procedure Laterality Date    HX OTHER SURGICAL      Right shoulder       Family History:  No family history on file. Social History:  Social History     Tobacco Use    Smoking status: Never    Smokeless tobacco: Never   Substance Use Topics    Drug use: Not Currently       Allergies: Allergies   Allergen Reactions    Influenza Virus Vaccines Anaphylaxis    Shellfish Derived Anaphylaxis    Betadine [Povidone-Iodine] Hives         Review of Systems   Review of Systems   Constitutional:  Positive for chills and fever. Negative for diaphoresis.    HENT:  Negative for congestion, ear pain, rhinorrhea and sore throat. Respiratory:  Negative for cough and shortness of breath. Cardiovascular:  Negative for chest pain. Gastrointestinal:  Positive for abdominal pain, diarrhea, nausea and vomiting. Negative for anal bleeding, blood in stool and constipation. Genitourinary:  Negative for difficulty urinating, dysuria, frequency and hematuria. Musculoskeletal:  Negative for arthralgias and myalgias. Neurological:  Negative for headaches. All other systems reviewed and are negative. Physical Exam      Temp Pulse Resp BP SpO2   12/06/22 0735 99.9 °F (37.7 °C) 87 16 (!) 146/93 97 %        Physical Exam  Vitals and nursing note reviewed. Constitutional:       General: She is not in acute distress. Appearance: She is well-developed. She is obese. She is not diaphoretic. Comments: 50 y.o. -American female    HENT:      Head: Normocephalic and atraumatic. Eyes:      General:         Right eye: No discharge. Left eye: No discharge. Conjunctiva/sclera: Conjunctivae normal.   Cardiovascular:      Rate and Rhythm: Normal rate and regular rhythm. Heart sounds: Normal heart sounds. No murmur heard. Pulmonary:      Effort: Pulmonary effort is normal. No respiratory distress. Breath sounds: Normal breath sounds. Abdominal:      General: Bowel sounds are normal.      Tenderness: There is abdominal tenderness in the right lower quadrant, suprapubic area and left lower quadrant. There is no right CVA tenderness, left CVA tenderness, guarding or rebound. Musculoskeletal:      Cervical back: Normal range of motion and neck supple. Skin:     General: Skin is warm and dry. Neurological:      Mental Status: She is alert and oriented to person, place, and time.    Psychiatric:         Behavior: Behavior normal.       Diagnostic Study Results     Labs -     Recent Results (from the past 12 hour(s))   CBC W/O DIFF    Collection Time: 12/06/22  8:28 AM   Result Value Ref Range WBC 5.7 3.6 - 11.0 K/uL    RBC 4.44 3.80 - 5.20 M/uL    HGB 13.8 11.5 - 16.0 g/dL    HCT 40.8 35.0 - 47.0 %    MCV 91.9 80.0 - 99.0 FL    MCH 31.1 26.0 - 34.0 PG    MCHC 33.8 30.0 - 36.5 g/dL    RDW 14.1 11.5 - 14.5 %    PLATELET 030 (H) 853 - 400 K/uL    MPV 9.0 8.9 - 12.9 FL    NRBC 0.0 0  WBC    ABSOLUTE NRBC 0.00 0.00 - 5.59 K/uL   METABOLIC PANEL, COMPREHENSIVE    Collection Time: 12/06/22  8:28 AM   Result Value Ref Range    Sodium 137 136 - 145 mmol/L    Potassium 4.1 3.5 - 5.1 mmol/L    Chloride 104 97 - 108 mmol/L    CO2 23 21 - 32 mmol/L    Anion gap 10 5 - 15 mmol/L    Glucose 96 65 - 100 mg/dL    BUN 14 6 - 20 MG/DL    Creatinine 1.09 (H) 0.55 - 1.02 MG/DL    BUN/Creatinine ratio 13 12 - 20      eGFR >60 >60 ml/min/1.73m2    Calcium 9.8 8.5 - 10.1 MG/DL    Bilirubin, total 0.6 0.2 - 1.0 MG/DL    ALT (SGPT) 27 12 - 78 U/L    AST (SGOT) 23 15 - 37 U/L    Alk.  phosphatase 79 45 - 117 U/L    Protein, total 8.5 (H) 6.4 - 8.2 g/dL    Albumin 4.2 3.5 - 5.0 g/dL    Globulin 4.3 (H) 2.0 - 4.0 g/dL    A-G Ratio 1.0 (L) 1.1 - 2.2     LIPASE    Collection Time: 12/06/22  8:28 AM   Result Value Ref Range    Lipase 162 73 - 393 U/L   URINALYSIS W/ REFLEX CULTURE    Collection Time: 12/06/22  8:38 AM    Specimen: Urine   Result Value Ref Range    Color YELLOW/STRAW      Appearance CLEAR CLEAR      Specific gravity 1.019      pH (UA) 6.5 5.0 - 8.0      Protein Negative NEG mg/dL    Glucose Negative NEG mg/dL    Ketone Negative NEG mg/dL    Bilirubin Negative NEG      Blood Negative NEG      Urobilinogen 1.0 0.2 - 1.0 EU/dL    Nitrites Negative NEG      Leukocyte Esterase TRACE (A) NEG      UA:UC IF INDICATED CULTURE NOT INDICATED BY UA RESULT CNI      WBC 0-4 0 - 4 /hpf    RBC 0-5 0 - 5 /hpf    Epithelial cells MANY (A) FEW /lpf    Bacteria Negative NEG /hpf    Hyaline cast 0-2 0 - 2 /lpf       Radiologic Studies -   CT ABD PELV WO CONT    Result Date: 12/6/2022  EXAM: CT ABD PELV WO CONT INDICATION: RLQ abdominal pain; allergic to IV contrast COMPARISON: Contrast-enhanced CT 9/4/2021. Contrast-enhanced MRI 9/15/2021. IV CONTRAST: None. ORAL CONTRAST: None. TECHNIQUE: Thin axial images were obtained through the abdomen and pelvis. Coronal and sagittal reformats were generated. CT dose reduction was achieved through use of a standardized protocol tailored for this examination and automatic exposure control for dose modulation. The absence of intravenous and oral contrast material substantially reduces the capacity of CT to evaluate the vasculature, solid organs and bowel. FINDINGS: LOWER THORAX: 2 mm calcified nodule at lateral base of right lower lobe again noted. LIVER: Lobular hypoattenuating lesion of the right hepatic lobe again shown measuring 15 mm in size shown previously to represent a cyst. BILIARY TREE: Status post cholecystectomy. CBD is not dilated. SPLEEN: within normal limits. PANCREAS: No focal abnormality. ADRENALS: Previously demonstrated 4.3 cm right adrenal mass is no longer shown. No masses partly demonstrated. Left adrenal gland appears normal. KIDNEYS/URETERS: No calculus or hydronephrosis. STOMACH: Small hiatal hernia. SMALL BOWEL: No dilatation or wall thickening. COLON: No dilatation or wall thickening. APPENDIX: Normal. PERITONEUM: No ascites or pneumoperitoneum. RETROPERITONEUM: No lymphadenopathy or aortic aneurysm. REPRODUCTIVE ORGANS: Bilobed lesion of left ovary with total dimensions 4.0 x 2.4 x 3.9 cm in size. Attenuation value higher than that of simple fluid. Findings could represent a complex cyst or cysts and can be further evaluated with ultrasound. URINARY BLADDER: Suboptimal assessment due to nondistention. No obvious abnormality. BONES: No destructive bone lesion. ABDOMINAL WALL: No mass or hernia. ADDITIONAL COMMENTS: N/A     1. 2 mm calcified nodule again shown is a right lower lobe. 2. Cyst of right hepatic lobe measuring 15 mm, unchanged.  3. Previously demonstrated 4.3 cm right adrenal mass is no longer shown. Clinical correlation recommended. 4. Small hiatal hernia. 5. Bilobed lesion of left ovary measuring up to 4 cm with attenuation value higher than that of simple fluid. This can be further evaluated with pelvic ultrasound. US TRANSVAGINAL    Result Date: 12/6/2022  INDICATION:  RLQ pain; left ovary lesion on CT. COMPARISON: CT abdomen pelvis 12/6/2022. PELVIC SONOGRAM is performed through the unremarkable urinary bladder. The uterus is normal in contour and echotexture with no evidence for fibroids. Uterus measures approximately 9.4 x 6.0 x 4.1 cm. Endometrial stripe is not thickened, measuring 5 mm by this technique. Left ovary contains 2 simple cysts measuring 2.7 cm and 2.5 cm, correlating with CT. Right ovary is unremarkable. Flow is preserved in both ovaries. Right ovary measures 3.4 x 2.1 x 1.1 cm and left ovary measures 4.9 x 3.1 x 2.2 cm. There is no significant free fluid in the pelvic cul-de-sac. TRANSVAGINAL SONOGRAM is performed following Pelvic Sonogram  in order to more accurately measure the endometrial thickness and to more adequately delineate ovarian anatomy. The uterus is normal in echotexture with no evidence for fibroids. Endometrial stripe measures 5 mm. Left ovary is obscured by bowel gas. Right ovary is normal in appearance. There is no adnexal mass. Two small simple cysts in the left ovary. No adnexal mass or other significant finding on Pelvic and Transvaginal Sonograms. US PELV NON OBS    Result Date: 12/6/2022  INDICATION:  RLQ pain; left ovary lesion on CT. COMPARISON: CT abdomen pelvis 12/6/2022. PELVIC SONOGRAM is performed through the unremarkable urinary bladder. The uterus is normal in contour and echotexture with no evidence for fibroids. Uterus measures approximately 9.4 x 6.0 x 4.1 cm. Endometrial stripe is not thickened, measuring 5 mm by this technique.  Left ovary contains 2 simple cysts measuring 2.7 cm and 2.5 cm, correlating with CT. Right ovary is unremarkable. Flow is preserved in both ovaries. Right ovary measures 3.4 x 2.1 x 1.1 cm and left ovary measures 4.9 x 3.1 x 2.2 cm. There is no significant free fluid in the pelvic cul-de-sac. TRANSVAGINAL SONOGRAM is performed following Pelvic Sonogram  in order to more accurately measure the endometrial thickness and to more adequately delineate ovarian anatomy. The uterus is normal in echotexture with no evidence for fibroids. Endometrial stripe measures 5 mm. Left ovary is obscured by bowel gas. Right ovary is normal in appearance. There is no adnexal mass. Two small simple cysts in the left ovary. No adnexal mass or other significant finding on Pelvic and Transvaginal Sonograms. Medical Decision Making   I am the first provider for this patient. I reviewed the vital signs, available nursing notes, past medical history, past surgical history, family history and social history. Vital Signs-Reviewed the patient's vital signs. Patient Vitals for the past 12 hrs:   Temp Pulse Resp BP SpO2   12/06/22 0735 99.9 °F (37.7 °C) 87 16 (!) 146/93 97 %       Records Reviewed: Nursing Notes and Old Medical Records    Provider Notes (Medical Decision Making): The patient presents the ED with right lower quadrant belly pain. Vitals are stable. She is nontachycardic afebrile. Differential diagnosis includes but is not limited to constipation, ovarian cyst, appendicitis, diverticulitis, SBO, UTI, pyelonephritis, cystitis. Exam is benign. Laboratory data without acute issues. CT ordered out of abundance of caution secondary to patient's history. It shows some chronic things that the patient is already aware of. There is a new finding of a left adnexal issue that was further evaluated on ultrasound showing ovarian cyst.  This is not in the location of patient's pain and not likely the etiology of discomfort. This was all discussed with the patient.   She should follow-up with her primary care medicine, gynecologist, and gastroenterologist.  She can always return to the ED for deterioration. ED Course:   Initial assessment performed. The patients presenting problems have been discussed, and they are in agreement with the care plan formulated and outlined with them. I have encouraged them to ask questions as they arise throughout their visit. ED Course as of 12/06/22 1228   Tue Dec 06, 2022   1018 The patient is being reevaluated. We discussed the CT findings and she is understanding and agreement with pending ultrasound. [TK]      ED Course User Index  [TK] Annie Mcleod       Critical Care Time: None    Disposition:  DISCHARGE NOTE:  12:28 PM  The pt is ready for discharge. The pt's signs, symptoms, diagnosis, and discharge instructions have been discussed and pt has conveyed their understanding. The pt is to follow up as recommended or return to ER should their symptoms worsen. Plan has been discussed and pt is in agreement. PLAN:  1. Current Discharge Medication List        2. Follow-up Information       Follow up With Specialties Details Why Contact Info    Annie Wilder  In 1 week As needed [address] [place] [address] [address] And [facility] [address]  In 1 week As needed           Return to ED if worse     Diagnosis     Clinical Impression:   1. Abdominal pain, right lower quadrant    2. Cyst of left ovary    3. Pulmonary nodule    4. Hiatal hernia        I have seen and evaluated the patient. My supervision physician was available for consultation. BROCK Whitlock (Electronic Signature)          Please note that this dictation was completed with Glass[employer], the computer voice recognition software. Quite often unanticipated grammatical, syntax, homophones, and other interpretive errors are inadvertently transcribed by the computer software. Please disregards these errors. Please excuse any errors that have escaped final proofreading.

## 2022-12-06 NOTE — DISCHARGE INSTRUCTIONS
It was a pleasure taking care of you at [facility] today. We know that when you come to [facility], you are entrusting us with your health, comfort, and safety. Our physicians and nurses honor that trust, and we truly appreciate the opportunity to care for you and your loved ones. We also value your feedback. If you receive a survey about your Emergency Department experience today, please fill it out. We care about our patients' feedback, and we listen to what you have to say. Thank you!

## 2022-12-06 NOTE — Clinical Note
[address]  \[facility]\"" EMERGENCY DEPT  [address] [facility]  [place] [id number] [phone]    Work/School Note    Date: 12/6/2022    To Whom It May concern:    Gopal Armijo was seen and treated today in the emergency room by the following provider(s):  Attending Provider: Ninfa Stein MD  Physician Assistant: Annie Weaver. Gopal Armijo is excused from work/school on 12/06/22 and 12/07/22. She is medically clear to return to work/school on 12/8/2022.        Sincerely,          Annie Currie